# Patient Record
Sex: FEMALE | Race: WHITE | NOT HISPANIC OR LATINO | Employment: OTHER | URBAN - METROPOLITAN AREA
[De-identification: names, ages, dates, MRNs, and addresses within clinical notes are randomized per-mention and may not be internally consistent; named-entity substitution may affect disease eponyms.]

---

## 2017-01-04 DIAGNOSIS — Z53.9 ERRONEOUS ENCOUNTER--DISREGARD: Primary | ICD-10-CM

## 2017-01-11 ENCOUNTER — OFFICE VISIT (OUTPATIENT)
Dept: RHEUMATOLOGY | Facility: CLINIC | Age: 67
End: 2017-01-11
Payer: MEDICARE

## 2017-01-11 ENCOUNTER — INFUSION THERAPY VISIT (OUTPATIENT)
Dept: INFUSION THERAPY | Facility: CLINIC | Age: 67
End: 2017-01-11
Attending: INTERNAL MEDICINE
Payer: MEDICARE

## 2017-01-11 VITALS — SYSTOLIC BLOOD PRESSURE: 137 MMHG | TEMPERATURE: 97.1 F | HEART RATE: 72 BPM | DIASTOLIC BLOOD PRESSURE: 74 MMHG

## 2017-01-11 VITALS
DIASTOLIC BLOOD PRESSURE: 65 MMHG | BODY MASS INDEX: 25.6 KG/M2 | WEIGHT: 166 LBS | SYSTOLIC BLOOD PRESSURE: 130 MMHG | TEMPERATURE: 97.7 F | HEART RATE: 79 BPM | RESPIRATION RATE: 16 BRPM

## 2017-01-11 DIAGNOSIS — M06.9 RHEUMATOID ARTHRITIS INVOLVING MULTIPLE SITES, UNSPECIFIED RHEUMATOID FACTOR PRESENCE: Primary | ICD-10-CM

## 2017-01-11 PROCEDURE — 96365 THER/PROPH/DIAG IV INF INIT: CPT

## 2017-01-11 PROCEDURE — 99213 OFFICE O/P EST LOW 20 MIN: CPT | Performed by: INTERNAL MEDICINE

## 2017-01-11 PROCEDURE — 25000128 H RX IP 250 OP 636: Performed by: INTERNAL MEDICINE

## 2017-01-11 RX ORDER — DIPHENHYDRAMINE HCL 25 MG
25 CAPSULE ORAL ONCE
Status: DISCONTINUED | OUTPATIENT
Start: 2017-01-11 | End: 2017-01-11 | Stop reason: HOSPADM

## 2017-01-11 RX ORDER — ACETAMINOPHEN 325 MG/1
650 TABLET ORAL ONCE
Status: DISCONTINUED | OUTPATIENT
Start: 2017-01-11 | End: 2017-01-11 | Stop reason: HOSPADM

## 2017-01-11 RX ORDER — PREDNISONE 5 MG/1
10 TABLET ORAL DAILY
Qty: 120 TABLET | Refills: 2 | Status: SHIPPED | OUTPATIENT
Start: 2017-01-11 | End: 2017-08-30

## 2017-01-11 RX ORDER — DIAZEPAM 5 MG
5 TABLET ORAL EVERY 12 HOURS PRN
Qty: 60 TABLET | Refills: 0 | Status: SHIPPED | OUTPATIENT
Start: 2017-01-11 | End: 2017-03-03

## 2017-01-11 RX ADMIN — SODIUM CHLORIDE 750 MG: 9 INJECTION, SOLUTION INTRAVENOUS at 11:01

## 2017-01-11 NOTE — PROGRESS NOTES
"Infusion Nursing Note:  Leanne Zuñiga presents today for Orencia.    Patient seen by provider today: No    Note: pt did not take premeds at clinic as she has been self administering this med at home for years.    Intravenous Access:  Peripheral IV placed.    Treatment Conditions:  Rheumatology Infusion Checklist PRIOR TO INFUSION OF BIOLOGICAL MEDICATIONS OR ANY OF THESE AS LISTED: Orencia (abatacept) \".rheumbiologicalchecklist\"    Prior to Infusion of biological medications or any of these as listed:    1. Elevated temperature, fever, chills, productive cough or abnormal vital signs, night sweats, coughing up blood or sputum, no appetite or abnormal vital signs : NO    2. Open wounds or new incisions: NO    3. Recent hospitalization: NO    4.  Recent surgeries:  NO    5. Any upcoming surgeries or dental procedures?:NO    6. Any current or recent bouts of illness or infection? On any antibiotics? : NO    7. Any new, sudden or worsening abdominal pain :NO    8. Vaccination within 4 weeks? Patient or someone in the household is scheduled to receive vaccination? No live virus vaccines prior to or during treatment :NO    9. Any nervous system diseases [i.e. multiple sclerosis, Guillain-[place], seizures, neurological  changes]: NO    10. Pregnant or breast feeding; or plans on pregnancy in the future: NO    11. Signs of worsening depression or suicidal ideations while taking benlysta:NO    12. New-onset medical symptoms: NO    13.  New cancer diagnosis or on chemotherapy or radiation NO    14.  Evaluate for any sign of active TB [Unexplained weight loss, Loss of appetite, Night sweats, Fever, Fatigue, Chills, Coughing for 3 weeks or longer, Hemoptysis (coughing up blood), Chest pain]: NO    **Note: If answered yes to any of the above, hold the infusion and contact ordering rheumatologist or on-call rheumatologist.   .      Post Infusion Assessment:  Patient tolerated infusion without incident.    Discharge Plan: "   Patient discharged in stable condition accompanied by: roz.    Chaparrita Hahn RN

## 2017-01-11 NOTE — PROGRESS NOTES
HISTORY OF PRESENT ILLNESS:  Shaheed Zuñiga is seen back in followup for her rheumatoid arthritis.  She has now received 1 infusion of Orencia however, she tells me she has a high co-pay and did not understand that this was going to be an issue.  The infusion did make her feel quite tired and fatigued afterwards but overall she showed me her prednisone dosing and she cut it down to 11 mg a day with a few minor bumps in the road.  She does think the Orencia overall, is working and admits to less pain, swelling and stiffness which she is taking it consistently.  No infusion reactions noted, no infections.  She does say that it makes her quite laureano and has a son who apparently sees another rheumatologist who gives him Valium for his moodiness which is likely associated with the prednisone usage.  She still has some pain and swelling involving both wrists in her left knee, but admits overall these are improved.      PHYSICAL EXAMINATION:     VITALS:  Blood pressure 130/65, pulse 79, weight 166.     MUSCULOSKELETAL:  There is still mild synovitis of both wrists.  There is also mild synovitis with effusion of the left knee.  Overall, I do think things are better though.      IMPRESSION:  Rheumatoid arthritis.      RECOMMENDATIONS:   1.  I will give her some Valium for the anxiety, this probably being caused by the higher dose of prednisone.   2.  Continue Orencia infusions.  I will see if there is any copay assistance that is available to her or assistance from [facility] or [facility], continue monthly infusions.   3.  Continue to taper the prednisone with goal to get below 10 mg a day.     4.  She should have labs checked with her next infusion.     5.  She should have routine followup with me in 2-3 months.         KARIME VINCENT MD             D: 2017 14:12   T: 2017 16:38   MT: JASON#[id number]      Name:     SHAHEED ZUÑIGA   MRN:      [id number]        Account:      [id number]   :      1950            Visit Date:   01/11/2017      Document: [id number]

## 2017-01-11 NOTE — NURSING NOTE
"Chief Complaint   Patient presents with     RECHECK     RA       Initial /65 mmHg  Pulse 79  Temp(Src) 97.7  F (36.5  C) (Oral)  Resp 16  Wt 166 lb (75.297 kg) Estimated body mass index is 25.6 kg/(m^2) as calculated from the following:    Height as of 11/4/15: 5' 7.5\" (1.715 m).    Weight as of this encounter: 166 lb (75.297 kg).  BP completed using cuff size: sandor Lerner LPN      "

## 2017-02-08 ENCOUNTER — HOSPITAL ENCOUNTER (OUTPATIENT)
Dept: LAB | Facility: CLINIC | Age: 67
Discharge: HOME | End: 2017-02-08
Attending: INTERNAL MEDICINE | Admitting: INTERNAL MEDICINE
Payer: MEDICARE

## 2017-02-08 ENCOUNTER — INFUSION THERAPY VISIT (OUTPATIENT)
Dept: INFUSION THERAPY | Facility: CLINIC | Age: 67
End: 2017-02-08
Attending: INTERNAL MEDICINE
Payer: MEDICARE

## 2017-02-08 VITALS
DIASTOLIC BLOOD PRESSURE: 80 MMHG | TEMPERATURE: 98.1 F | HEART RATE: 74 BPM | RESPIRATION RATE: 16 BRPM | SYSTOLIC BLOOD PRESSURE: 137 MMHG

## 2017-02-08 DIAGNOSIS — M06.9 RHEUMATOID ARTHRITIS INVOLVING MULTIPLE SITES, UNSPECIFIED RHEUMATOID FACTOR PRESENCE: Primary | ICD-10-CM

## 2017-02-08 LAB
ALBUMIN SERPL-MCNC: 3.4 G/DL (ref 3.4–5)
ALP SERPL-CCNC: 76 U/L (ref 40–150)
ALT SERPL W P-5'-P-CCNC: 27 U/L (ref 0–50)
ANION GAP SERPL CALCULATED.3IONS-SCNC: 7 MMOL/L (ref 3–14)
AST SERPL W P-5'-P-CCNC: 13 U/L (ref 0–45)
BASOPHILS # BLD AUTO: 0 10E9/L (ref 0–0.2)
BASOPHILS NFR BLD AUTO: 0.4 %
BILIRUB SERPL-MCNC: 0.5 MG/DL (ref 0.2–1.3)
BUN SERPL-MCNC: 8 MG/DL (ref 7–30)
CALCIUM SERPL-MCNC: 8.8 MG/DL (ref 8.5–10.1)
CHLORIDE SERPL-SCNC: 108 MMOL/L (ref 94–109)
CO2 SERPL-SCNC: 28 MMOL/L (ref 20–32)
CREAT SERPL-MCNC: 0.86 MG/DL (ref 0.52–1.04)
DIFFERENTIAL METHOD BLD: ABNORMAL
EOSINOPHIL # BLD AUTO: 0.3 10E9/L (ref 0–0.7)
EOSINOPHIL NFR BLD AUTO: 2.4 %
ERYTHROCYTE [DISTWIDTH] IN BLOOD BY AUTOMATED COUNT: 13.1 % (ref 10–15)
GFR SERPL CREATININE-BSD FRML MDRD: 67 ML/MIN/1.7M2
GLUCOSE SERPL-MCNC: 84 MG/DL (ref 70–99)
HCT VFR BLD AUTO: 47 % (ref 35–47)
HGB BLD-MCNC: 15.8 G/DL (ref 11.7–15.7)
IMM GRANULOCYTES # BLD: 0 10E9/L (ref 0–0.4)
IMM GRANULOCYTES NFR BLD: 0.4 %
LYMPHOCYTES # BLD AUTO: 3.9 10E9/L (ref 0.8–5.3)
LYMPHOCYTES NFR BLD AUTO: 37.7 %
MCH RBC QN AUTO: 30.6 PG (ref 26.5–33)
MCHC RBC AUTO-ENTMCNC: 33.6 G/DL (ref 31.5–36.5)
MCV RBC AUTO: 91 FL (ref 78–100)
MONOCYTES # BLD AUTO: 1.1 10E9/L (ref 0–1.3)
MONOCYTES NFR BLD AUTO: 11 %
NEUTROPHILS # BLD AUTO: 5 10E9/L (ref 1.6–8.3)
NEUTROPHILS NFR BLD AUTO: 48.1 %
PLATELET # BLD AUTO: 355 10E9/L (ref 150–450)
POTASSIUM SERPL-SCNC: 3.9 MMOL/L (ref 3.4–5.3)
PROT SERPL-MCNC: 6.7 G/DL (ref 6.8–8.8)
RBC # BLD AUTO: 5.16 10E12/L (ref 3.8–5.2)
SODIUM SERPL-SCNC: 143 MMOL/L (ref 133–144)
WBC # BLD AUTO: 10.3 10E9/L (ref 4–11)

## 2017-02-08 PROCEDURE — 36415 COLL VENOUS BLD VENIPUNCTURE: CPT | Performed by: INTERNAL MEDICINE

## 2017-02-08 PROCEDURE — 80053 COMPREHEN METABOLIC PANEL: CPT | Performed by: INTERNAL MEDICINE

## 2017-02-08 PROCEDURE — 85025 COMPLETE CBC W/AUTO DIFF WBC: CPT | Performed by: INTERNAL MEDICINE

## 2017-02-08 PROCEDURE — 25000128 H RX IP 250 OP 636: Performed by: INTERNAL MEDICINE

## 2017-02-08 PROCEDURE — 96365 THER/PROPH/DIAG IV INF INIT: CPT

## 2017-02-08 RX ORDER — ACETAMINOPHEN 325 MG/1
650 TABLET ORAL ONCE
Status: DISCONTINUED | OUTPATIENT
Start: 2017-02-08 | End: 2017-02-08 | Stop reason: HOSPADM

## 2017-02-08 RX ORDER — DIPHENHYDRAMINE HCL 25 MG
25 CAPSULE ORAL ONCE
Status: DISCONTINUED | OUTPATIENT
Start: 2017-02-08 | End: 2017-02-08 | Stop reason: HOSPADM

## 2017-02-08 RX ADMIN — SODIUM CHLORIDE 750 MG: 9 INJECTION, SOLUTION INTRAVENOUS at 11:48

## 2017-02-08 NOTE — Clinical Note
[facility] CANCER INFUSION  [facility]  [address]  [place] [address]  [phone]      February 9, 2017      Leanne Zuñiga  [address] [address]        Dear Dr. Olu Perdomo has reviewed the results of your labs of 2/8/17, and has made the following observations:      Labs are normal    Please don't hesitate to contact our office with any additional questions or concerns.           Sincerely,      GINNY KrausA  For Nate Raines MD

## 2017-02-08 NOTE — PROGRESS NOTES
"Infusion Nursing Note:   Leanne Zuñiga presents today for Orencia infusion.    Patient seen by provider today: No   present during visit today: Not Applicable.    Note: N/A.    Intravenous Access:  Peripheral IV placed.    Treatment Conditions:  Rheumatology Infusion Checklist: PRIOR TO INFUSION OF BIOLOGICAL MEDICATIONS OR ANY OF THESE AS LISTED: Orencia (abatacept) \".rheumbiologicalchecklist\"    Prior to Infusion of biological medications or any of these as listed:    1. Elevated temperature, fever, chills, productive cough or abnormal vital signs, night sweats, coughing up blood or sputum, no appetite or abnormal vital signs : NO    2. Open wounds or new incisions: NO    3. Recent hospitalization: NO    4.  Recent surgeries:  NO    5. Any upcoming surgeries or dental procedures?:NO    6. Any current or recent bouts of illness or infection? On any antibiotics? : NO    7. Any new, sudden or worsening abdominal pain :NO    8. Vaccination within 4 weeks? Patient or someone in the household is scheduled to receive vaccination? No live virus vaccines prior to or during treatment :NO    9. Any nervous system diseases [i.e. multiple sclerosis, Guillain-[place], seizures, neurological  changes]: NO    10. Pregnant or breast feeding; or plans on pregnancy in the future: NO    11. Signs of worsening depression or suicidal ideations while taking benlysta:NO    12. New-onset medical symptoms: NO    13.  New cancer diagnosis or on chemotherapy or radiation NO    14.  Evaluate for any sign of active TB [Unexplained weight loss, Loss of appetite, Night sweats, Fever, Fatigue, Chills, Coughing for 3 weeks or longer, Hemoptysis (coughing up blood), Chest pain]: NO    **Note: If answered yes to any of the above, hold the infusion and contact ordering rheumatologist or on-call rheumatologist.   .      Post Infusion Assessment:  Patient tolerated infusion without incident.    Discharge Plan:   Patient discharged in " stable condition accompanied by: self.    Brandi Carranza, RN

## 2017-03-03 DIAGNOSIS — M06.9 RHEUMATOID ARTHRITIS INVOLVING MULTIPLE SITES, UNSPECIFIED RHEUMATOID FACTOR PRESENCE: ICD-10-CM

## 2017-03-06 RX ORDER — DIAZEPAM 5 MG
5 TABLET ORAL EVERY 12 HOURS PRN
Qty: 60 TABLET | Refills: 0 | Status: SHIPPED | OUTPATIENT
Start: 2017-03-06 | End: 2017-06-07

## 2017-03-08 ENCOUNTER — INFUSION THERAPY VISIT (OUTPATIENT)
Dept: INFUSION THERAPY | Facility: CLINIC | Age: 67
End: 2017-03-08
Attending: INTERNAL MEDICINE
Payer: MEDICARE

## 2017-03-08 VITALS — HEART RATE: 76 BPM | TEMPERATURE: 98.6 F | SYSTOLIC BLOOD PRESSURE: 122 MMHG | DIASTOLIC BLOOD PRESSURE: 69 MMHG

## 2017-03-08 DIAGNOSIS — M06.9 RHEUMATOID ARTHRITIS INVOLVING MULTIPLE SITES, UNSPECIFIED RHEUMATOID FACTOR PRESENCE: Primary | ICD-10-CM

## 2017-03-08 PROCEDURE — 25000128 H RX IP 250 OP 636: Performed by: INTERNAL MEDICINE

## 2017-03-08 PROCEDURE — 96365 THER/PROPH/DIAG IV INF INIT: CPT

## 2017-03-08 RX ORDER — ACETAMINOPHEN 325 MG/1
650 TABLET ORAL ONCE
Status: CANCELLED
Start: 2017-03-08 | End: 2017-03-08

## 2017-03-08 RX ORDER — DIPHENHYDRAMINE HCL 25 MG
25 CAPSULE ORAL ONCE
Status: CANCELLED
Start: 2017-03-08 | End: 2017-03-08

## 2017-03-08 RX ADMIN — SODIUM CHLORIDE 250 ML: 9 INJECTION, SOLUTION INTRAVENOUS at 14:07

## 2017-03-08 RX ADMIN — SODIUM CHLORIDE 750 MG: 9 INJECTION, SOLUTION INTRAVENOUS at 14:07

## 2017-03-08 NOTE — PROGRESS NOTES
"Infusion Nursing Note:  Leanne Zuñiga presents today for Orencia.    Patient seen by provider today: No   present during visit today: Not Applicable.    Note: N/A.    Intravenous Access:  Peripheral IV placed.    Treatment Conditions:  Rheumatology Infusion Checklist: PRIOR TO INFUSION OF BIOLOGICAL MEDICATIONS OR ANY OF THESE AS LISTED: Orencia (abatacept) \".rheumbiologicalchecklist\"    Prior to Infusion of biological medications or any of these as listed:    1. Elevated temperature, fever, chills, productive cough or abnormal vital signs, night sweats, coughing up blood or sputum, no appetite or abnormal vital signs : NO    2. Open wounds or new incisions: NO    3. Recent hospitalization: NO    4.  Recent surgeries:  NO    5. Any upcoming surgeries or dental procedures?:NO    6. Any current or recent bouts of illness or infection? On any antibiotics? : NO    7. Any new, sudden or worsening abdominal pain :NO    8. Vaccination within 4 weeks? Patient or someone in the household is scheduled to receive vaccination? No live virus vaccines prior to or during treatment :NO    9. Any nervous system diseases [i.e. multiple sclerosis, Guillain-[place], seizures, neurological  changes]: NO    10. Pregnant or breast feeding; or plans on pregnancy in the future: N/A    11. Signs of worsening depression or suicidal ideations while taking benlysta:N/A    12. New-onset medical symptoms: NO    13.  New cancer diagnosis or on chemotherapy or radiation NO    14.  Evaluate for any sign of active TB [Unexplained weight loss, Loss of appetite, Night sweats, Fever, Fatigue, Chills, Coughing for 3 weeks or longer, Hemoptysis (coughing up blood), Chest pain]: NO    **Note: If answered yes to any of the above, hold the infusion and contact ordering rheumatologist or on-call rheumatologist.   .      Post Infusion Assessment:  Patient tolerated infusion without incident.  Blood return noted pre and post infusion.  Site patent " and intact, free from redness, edema or discomfort.  No evidence of extravasations.  Access discontinued per protocol.    Discharge Plan:   Patient discharged in stable condition accompanied by: self.  Departure Mode: Ambulatory.    Brandi Badillo RN

## 2017-03-08 NOTE — MR AVS SNAPSHOT
"              After Visit Summary   3/8/2017    Leanne Zuñiga    MRN: [id number]           Patient Information     Date Of Birth          1950        Visit Information        Provider Department      3/8/2017 1:30 PM ROOM 10 [facility] Cancer Infusion        Today's Diagnoses     Rheumatoid arthritis involving multiple sites, unspecified rheumatoid factor presence (H)    -  1       Follow-ups after your visit        Your next 10 appointments already scheduled     Apr 12, 2017  1:30 PM CDT   Level 1 with ROOM 5 [facility] Cancer Infusion ([facility])    n [facility]  [address]  [facility] [address] [phone]            Apr 12, 2017  2:30 PM CDT   Return Visit with Nate Raines MD   [facility]    [address] [id number] [phone]              Who to contact     If you have questions or need follow up information about today's clinic visit or your schedule please contact [facility] directly at [phone].  Normal or non-critical lab and imaging results will be communicated to you by [employer], letter or phone within 4 business days after the clinic has received the results. If you do not hear from us within 7 days, please contact the clinic through [employer] or phone. If you have a critical or abnormal lab result, we will notify you by phone as soon as possible.  Submit refill requests through [employer] or call your pharmacy and they will forward the refill request to us. Please allow 3 business days for your refill to be completed.          Additional Information About Your Visit        MyChart Information     [employer] lets you send messages to your doctor, view your test results, renew your prescriptions, schedule appointments and more. To sign up, go to [email or web address] . Click on \"Log in\" on the left side of the screen, which will take you to the Welcome page. " "Then click on \"Sign up Now\" on the right side of the page.     You will be asked to enter the access code listed below, as well as some personal information. Please follow the directions to create your username and password.     Your access code is: [email or web address]  Expires: 2017  3:08 PM     Your access code will  in 90 days. If you need help or a new code, please call your [facility] [place] clinic or [phone].        Care EveryWhere ID     This is your Care EveryWhere ID. This could be used by other organizations to access your [facility] [place] medical records  [id number]        Your Vitals Were     Pulse Temperature                76 98.6  F (37  C) (Oral)           Blood Pressure from Last 3 Encounters:   17 122/69   17 137/80   17 130/65    Weight from Last 3 Encounters:   17 75.3 kg (166 lb)   16 72.1 kg (159 lb)   16 71.7 kg (158 lb)              We Performed the Following     Treatment Conditions     Treatment Conditions     Treatment Conditions        Primary Care Provider Office Phone # Fax #    Cleo Henry [phone]       [facility] [address] [address]        Thank you!     Thank you for choosing [facility]  for your care. Our goal is always to provide you with excellent care. Hearing back from our patients is one way we can continue to improve our services. Please take a few minutes to complete the written survey that you may receive in the mail after your visit with us. Thank you!             Your Updated Medication List - Protect others around you: Learn how to safely use, store and throw away your medicines at www.disposemymeds.org.          This list is accurate as of: 3/8/17  3:08 PM.  Always use your most recent med list.                   Brand Name Dispense Instructions for use    acetaminophen 325 MG tablet    TYLENOL    100 tablet    Take 2 tablets (650 mg) by mouth every 4 hours as needed for pain    "    ALOE VERA JUICE Liqd      2 oz twice daily       Alpha-Lipoic Acid 50 MG Caps      100-200 mg daily       ascorbic acid 500 MG tablet    VITAMIN C    30 tablet    Take 3 tablets (1,500 mg) by mouth daily       calcium-magnesium 500-250 MG Tabs per tablet    CALMAG     Take 1 tablet by mouth daily       diazepam 5 MG tablet    VALIUM    60 tablet    Take 1 tablet (5 mg) by mouth every 12 hours as needed for anxiety or sleep       HYDROcodone-acetaminophen 5-325 MG per tablet    NORCO    240 tablet    Take 1-2 tablets by mouth every 6 hours as needed for moderate to severe pain or pain       ibuprofen 600 MG tablet    ADVIL/MOTRIN    90 tablet    Take 1 tablet (600 mg) by mouth every 6 hours as needed for pain       Krill Oil 1000 MG Caps      Taking 2 daily.       niacin 500 MG tablet      Take 1 tablet (500 mg) by mouth daily (with breakfast) Takes 2000 mg daily       * OVER-THE-COUNTER      Tumeric       * OVER-THE-COUNTER      Megastress B with vitamin C 2 tablets daily       * OVER-THE-COUNTER      Powdered barely grass 1 tablespoon daily       * OVER-THE-COUNTER      Bio-Flex.       * OVER-THE-COUNTER      Ashwagandha 400 mg daily       * predniSONE 1 MG tablet    DELTASONE    120 tablet    Taper as directed from 12.5 mg       * predniSONE 5 MG tablet    DELTASONE    120 tablet    Take 2 tablets (10 mg) by mouth daily Patient is tapering from 11 mg daily--currently will need to take 2 5 mg tablets daily       vitamin D 2000 UNITS tablet     100 tablet    Take 2,000 Units by mouth Twice weekly       * Notice:  This list has 7 medication(s) that are the same as other medications prescribed for you. Read the directions carefully, and ask your doctor or other care provider to review them with you.

## 2017-04-12 ENCOUNTER — INFUSION THERAPY VISIT (OUTPATIENT)
Dept: INFUSION THERAPY | Facility: CLINIC | Age: 67
End: 2017-04-12
Attending: INTERNAL MEDICINE
Payer: MEDICARE

## 2017-04-12 ENCOUNTER — OFFICE VISIT (OUTPATIENT)
Dept: RHEUMATOLOGY | Facility: CLINIC | Age: 67
End: 2017-04-12
Payer: MEDICARE

## 2017-04-12 VITALS
DIASTOLIC BLOOD PRESSURE: 73 MMHG | WEIGHT: 161.8 LBS | SYSTOLIC BLOOD PRESSURE: 127 MMHG | RESPIRATION RATE: 16 BRPM | HEART RATE: 70 BPM | TEMPERATURE: 97.4 F | HEIGHT: 68 IN | BODY MASS INDEX: 24.52 KG/M2

## 2017-04-12 VITALS — SYSTOLIC BLOOD PRESSURE: 127 MMHG | TEMPERATURE: 97.4 F | HEART RATE: 70 BPM | DIASTOLIC BLOOD PRESSURE: 73 MMHG

## 2017-04-12 DIAGNOSIS — M06.9 RHEUMATOID ARTHRITIS INVOLVING MULTIPLE SITES, UNSPECIFIED RHEUMATOID FACTOR PRESENCE: Primary | ICD-10-CM

## 2017-04-12 DIAGNOSIS — R21 RASH AND NONSPECIFIC SKIN ERUPTION: ICD-10-CM

## 2017-04-12 DIAGNOSIS — R53.82 CHRONIC FATIGUE: ICD-10-CM

## 2017-04-12 LAB
CRP SERPL-MCNC: 10.5 MG/L (ref 0–8)
ERYTHROCYTE [SEDIMENTATION RATE] IN BLOOD BY WESTERGREN METHOD: 7 MM/H (ref 0–30)
TSH SERPL DL<=0.005 MIU/L-ACNC: 0.53 MU/L (ref 0.4–4)

## 2017-04-12 PROCEDURE — 99213 OFFICE O/P EST LOW 20 MIN: CPT | Performed by: INTERNAL MEDICINE

## 2017-04-12 PROCEDURE — 85652 RBC SED RATE AUTOMATED: CPT | Performed by: INTERNAL MEDICINE

## 2017-04-12 PROCEDURE — 25000128 H RX IP 250 OP 636: Performed by: INTERNAL MEDICINE

## 2017-04-12 PROCEDURE — 84443 ASSAY THYROID STIM HORMONE: CPT | Performed by: INTERNAL MEDICINE

## 2017-04-12 PROCEDURE — 36415 COLL VENOUS BLD VENIPUNCTURE: CPT | Performed by: INTERNAL MEDICINE

## 2017-04-12 PROCEDURE — 86140 C-REACTIVE PROTEIN: CPT | Performed by: INTERNAL MEDICINE

## 2017-04-12 PROCEDURE — 96365 THER/PROPH/DIAG IV INF INIT: CPT

## 2017-04-12 RX ORDER — ACETAMINOPHEN 325 MG/1
650 TABLET ORAL ONCE
Status: CANCELLED
Start: 2017-04-12 | End: 2017-04-12

## 2017-04-12 RX ORDER — HYDROCODONE BITARTRATE AND ACETAMINOPHEN 5; 325 MG/1; MG/1
1-2 TABLET ORAL EVERY 6 HOURS PRN
Qty: 240 TABLET | Refills: 0 | Status: SHIPPED | OUTPATIENT
Start: 2017-04-12 | End: 2017-12-14

## 2017-04-12 RX ORDER — DIPHENHYDRAMINE HCL 25 MG
25 CAPSULE ORAL ONCE
Status: CANCELLED
Start: 2017-04-12 | End: 2017-04-12

## 2017-04-12 RX ADMIN — SODIUM CHLORIDE 750 MG: 9 INJECTION, SOLUTION INTRAVENOUS at 14:04

## 2017-04-12 NOTE — NURSING NOTE
"Initial /73  Pulse 70  Temp 97.4  F (36.3  C) (Oral)  Resp 16  Ht 1.727 m (5' 8\")  Wt 73.4 kg (161 lb 12.8 oz)  BMI 24.6 kg/m2 Estimated body mass index is 24.6 kg/(m^2) as calculated from the following:    Height as of this encounter: 1.727 m (5' 8\").    Weight as of this encounter: 73.4 kg (161 lb 12.8 oz). .    Ladarius Ellison, PJ    "

## 2017-04-12 NOTE — LETTER
[facility]  [address] [address]  [phone]      April 13, 2017      Leanne Zuñiga  [address] [address]        Dear Dr. Olu Perdomo has reviewed the results of your labs of 4/12/17, and has made the following observations:      Labs are normal.   Thyroid is normal too.    Please don't hesitate to contact our office with any additional questions or concerns.           Sincerely,    GINNY KrausA  For Nate Raines MD

## 2017-04-12 NOTE — PROGRESS NOTES
"Infusion Nursing Note:  Leanne Zuñiga presents today for Orencia.    Patient seen by provider today: No   present during visit today: Not Applicable.    Note: N/A.    Intravenous Access:  Peripheral IV placed.    Treatment Conditions:  Rheumatology Infusion Checklist: PRIOR TO INFUSION OF BIOLOGICAL MEDICATIONS OR ANY OF THESE AS LISTED: Orencia (abatacept) \".rheumbiologicalchecklist\"    Prior to Infusion of biological medications or any of these as listed:    1. Elevated temperature, fever, chills, productive cough or abnormal vital signs, night sweats, coughing up blood or sputum, no appetite or abnormal vital signs : NO    2. Open wounds or new incisions: NO    3. Recent hospitalization: NO    4.  Recent surgeries:  NO    5. Any upcoming surgeries or dental procedures?:NO    6. Any current or recent bouts of illness or infection? On any antibiotics? : NO    7. Any new, sudden or worsening abdominal pain :NO    8. Vaccination within 4 weeks? Patient or someone in the household is scheduled to receive vaccination? No live virus vaccines prior to or during treatment :NO    9. Any nervous system diseases [i.e. multiple sclerosis, Guillain-[place], seizures, neurological  changes]: NO    10. Pregnant or breast feeding; or plans on pregnancy in the future: NO    11. Signs of worsening depression or suicidal ideations while taking benlysta:NO    12. New-onset medical symptoms: NO    13.  New cancer diagnosis or on chemotherapy or radiation NO    14.  Evaluate for any sign of active TB [Unexplained weight loss, Loss of appetite, Night sweats, Fever, Fatigue, Chills, Coughing for 3 weeks or longer, Hemoptysis (coughing up blood), Chest pain]: NO    **Note: If answered yes to any of the above, hold the infusion and contact ordering rheumatologist or on-call rheumatologist.   .      Post Infusion Assessment:  Patient tolerated infusion without incident.  Blood return noted pre and post infusion.  Site patent " and intact, free from redness, edema or discomfort.  No evidence of extravasations.  Access discontinued per protocol.    Discharge Plan:   Patient discharged in stable condition accompanied by: self.    Chaparrita Hahn RN

## 2017-04-12 NOTE — MR AVS SNAPSHOT
"              After Visit Summary   4/12/2017    Leanne Zuñiga    MRN: [id number]           Patient Information     Date Of Birth          1950        Visit Information        Provider Department      4/12/2017 1:30 PM ROOM 5 [facility]        Today's Diagnoses     Rheumatoid arthritis involving multiple sites, unspecified rheumatoid factor presence (H)    -  1       Follow-ups after your visit        Your next 10 appointments already scheduled     Apr 12, 2017  2:30 PM CDT   Return Visit with Nate Raines MD   [facility]    [address]  [facility] [address]   [phone]            May 10, 2017  2:00 PM CDT   Level 1 with ROOM 4 [facility] ([facility])    n Medical Ctr [facility]  [address]  [facility] [address]   [phone]              Who to contact     If you have questions or need follow up information about today's clinic visit or your schedule please contact [facility] directly at [phone].  Normal or non-critical lab and imaging results will be communicated to you by TIME PLUS Qhart, letter or phone within 4 business days after the clinic has received the results. If you do not hear from us within 7 days, please contact the clinic through [email or web address] or phone. If you have a critical or abnormal lab result, we will notify you by phone as soon as possible.  Submit refill requests through Fotoup or call your pharmacy and they will forward the refill request to us. Please allow 3 business days for your refill to be completed.          Additional Information About Your Visit        MyChart Information     Fotoup lets you send messages to your doctor, view your test results, renew your prescriptions, schedule appointments and more. To sign up, go to [email or web address] . Click on \"Log in\" on the left side of the screen, which will take you to the Welcome " "page. Then click on \"Sign up Now\" on the right side of the page.     You will be asked to enter the access code listed below, as well as some personal information. Please follow the directions to create your username and password.     Your access code is: [email or web address]  Expires: 2017  4:08 PM     Your access code will  in 90 days. If you need help or a new code, please call your [facility] clinic or [phone].        Care EveryWhere ID     This is your Care EveryWhere ID. This could be used by other organizations to access your [facility] medical records  [id number]        Your Vitals Were     Pulse Temperature                70 97.4  F (36.3  C) (Oral)           Blood Pressure from Last 3 Encounters:   17 127/73   17 122/69   17 137/80    Weight from Last 3 Encounters:   17 75.3 kg (166 lb)   16 72.1 kg (159 lb)   16 71.7 kg (158 lb)              Today, you had the following     No orders found for display       Primary Care Provider Office Phone # Fax #    Cleo Henry [phone]       [facility] [address] [id number]        Thank you!     Thank you for choosing [facility]  for your care. Our goal is always to provide you with excellent care. Hearing back from our patients is one way we can continue to improve our services. Please take a few minutes to complete the written survey that you may receive in the mail after your visit with us. Thank you!             Your Updated Medication List - Protect others around you: Learn how to safely use, store and throw away your medicines at www.disposemymeds.org.          This list is accurate as of: 17  2:29 PM.  Always use your most recent med list.                   Brand Name Dispense Instructions for use    acetaminophen 325 MG tablet    TYLENOL    100 tablet    Take 2 tablets (650 mg) by mouth every 4 hours as needed for pain       ALOE VERA JUICE Liqd      2 oz " twice daily       Alpha-Lipoic Acid 50 MG Caps      100-200 mg daily       ascorbic acid 500 MG tablet    VITAMIN C    30 tablet    Take 3 tablets (1,500 mg) by mouth daily       calcium-magnesium 500-250 MG Tabs per tablet    CALMAG     Take 1 tablet by mouth daily       diazepam 5 MG tablet    VALIUM    60 tablet    Take 1 tablet (5 mg) by mouth every 12 hours as needed for anxiety or sleep       HYDROcodone-acetaminophen 5-325 MG per tablet    NORCO    240 tablet    Take 1-2 tablets by mouth every 6 hours as needed for moderate to severe pain or pain       ibuprofen 600 MG tablet    ADVIL/MOTRIN    90 tablet    Take 1 tablet (600 mg) by mouth every 6 hours as needed for pain       Krill Oil 1000 MG Caps      Taking 2 daily.       niacin 500 MG tablet      Take 1 tablet (500 mg) by mouth daily (with breakfast) Takes 2000 mg daily       * OVER-THE-COUNTER      Tumeric       * OVER-THE-COUNTER      Megastress B with vitamin C 2 tablets daily       * OVER-THE-COUNTER      Powdered barely grass 1 tablespoon daily       * OVER-THE-COUNTER      Bio-Flex.       * OVER-THE-COUNTER      Ashwagandha 400 mg daily       * predniSONE 1 MG tablet    DELTASONE    120 tablet    Taper as directed from 12.5 mg       * predniSONE 5 MG tablet    DELTASONE    120 tablet    Take 2 tablets (10 mg) by mouth daily Patient is tapering from 11 mg daily--currently will need to take 2 5 mg tablets daily       vitamin D 2000 UNITS tablet     100 tablet    Take 2,000 Units by mouth Twice weekly       * Notice:  This list has 7 medication(s) that are the same as other medications prescribed for you. Read the directions carefully, and ask your doctor or other care provider to review them with you.

## 2017-04-12 NOTE — MR AVS SNAPSHOT
After Visit Summary   4/12/2017    Leanne Zuñiga    MRN: [id number]           Patient Information     Date Of Birth          1950        Visit Information        Provider Department      4/12/2017 2:30 PM Nate Raines MD [facility]        Today's Diagnoses     Rheumatoid arthritis involving multiple sites, unspecified rheumatoid factor presence (H)    -  1    Rash and nonspecific skin eruption        Chronic fatigue          Care Instructions    Per Physician's instructions          Follow-ups after your visit        Your next 10 appointments already scheduled     May 10, 2017  2:00 PM CDT   Level 1 with ROOM 4 [facility] ([facility])    [facility]  [address]  [place] [address]   [phone]            Jun 07, 2017  1:00 PM CDT   Level 1 with ROOM 9 [facility] ([facility])    [facility]  [address]  [place] [address]   [phone]            Jun 07, 2017  2:30 PM CDT   Return Visit with Nate Raines MD   [facility]    [address] [facility] [address]   [phone]              Who to contact     If you have questions or need follow up information about today's clinic visit or your schedule please contact [facility] directly at [phone].  Normal or non-critical lab and imaging results will be communicated to you by MyChart, letter or phone within 4 business days after the clinic has received the results. If you do not hear from us within 7 days, please contact the clinic through MyChart or phone. If you have a critical or abnormal lab result, we will notify you by phone as soon as possible.  Submit refill requests through WiOffer or call your pharmacy and they will forward the refill request to us. Please allow 3 business days for your  "refill to be completed.          Additional Information About Your Visit        Possible Web Information     Possible Web lets you send messages to your doctor, view your test results, renew your prescriptions, schedule appointments and more. To sign up, go to [email or web address] . Click on \"Log in\" on the left side of the screen, which will take you to the Welcome page. Then click on \"Sign up Now\" on the right side of the page.     You will be asked to enter the access code listed below, as well as some personal information. Please follow the directions to create your username and password.     Your access code is: [email or web address]  Expires: 2017  4:08 PM     Your access code will  in 90 days. If you need help or a new code, please call your [facility] clinic or [phone].        Care EveryWhere ID     This is your Care EveryWhere ID. This could be used by other organizations to access your [facility] medical records  [id number]        Your Vitals Were     Pulse Temperature Respirations Height BMI (Body Mass Index)       70 97.4  F (36.3  C) (Oral) 16 1.727 m (5' 8\") 24.6 kg/m2        Blood Pressure from Last 3 Encounters:   17 127/73   17 127/73   17 122/69    Weight from Last 3 Encounters:   17 73.4 kg (161 lb 12.8 oz)   17 75.3 kg (166 lb)   16 72.1 kg (159 lb)              We Performed the Following     CRP inflammation     Erythrocyte sedimentation rate auto     TSH with free T4 reflex          Where to get your medicines      Some of these will need a paper prescription and others can be bought over the counter.  Ask your nurse if you have questions.     Bring a paper prescription for each of these medications     HYDROcodone-acetaminophen 5-325 MG per tablet          Primary Care Provider Office Phone # Fax #    Cleo Henry [phone]       [facility] [address] [facility] [address]        Thank you!     Thank you for choosing " [facility]  for your care. Our goal is always to provide you with excellent care. Hearing back from our patients is one way we can continue to improve our services. Please take a few minutes to complete the written survey that you may receive in the mail after your visit with us. Thank you!             Your Updated Medication List - Protect others around you: Learn how to safely use, store and throw away your medicines at www.disposemymeds.org.          This list is accurate as of: 4/12/17 11:59 PM.  Always use your most recent med list.                   Brand Name Dispense Instructions for use    acetaminophen 325 MG tablet    TYLENOL    100 tablet    Take 2 tablets (650 mg) by mouth every 4 hours as needed for pain       ALOE VERA JUICE Liqd      2 oz twice daily       Alpha-Lipoic Acid 50 MG Caps      100-200 mg daily       ascorbic acid 500 MG tablet    VITAMIN C    30 tablet    Take 3 tablets (1,500 mg) by mouth daily       calcium-magnesium 500-250 MG Tabs per tablet    CALMAG     Take 1 tablet by mouth daily       diazepam 5 MG tablet    VALIUM    60 tablet    Take 1 tablet (5 mg) by mouth every 12 hours as needed for anxiety or sleep       HYDROcodone-acetaminophen 5-325 MG per tablet    NORCO    240 tablet    Take 1-2 tablets by mouth every 6 hours as needed for moderate to severe pain or pain       ibuprofen 600 MG tablet    ADVIL/MOTRIN    90 tablet    Take 1 tablet (600 mg) by mouth every 6 hours as needed for pain       Krill Oil 1000 MG Caps      Taking 2 daily.       niacin 500 MG tablet      Take 1 tablet (500 mg) by mouth daily (with breakfast) Takes 2000 mg daily       * OVER-THE-COUNTER      Tumeric       * OVER-THE-COUNTER      Megastress B with vitamin C 2 tablets daily       * OVER-THE-COUNTER      Powdered barely grass 1 tablespoon daily       * OVER-THE-COUNTER      Bio-Flex.       * OVER-THE-COUNTER      Ashwagandha 400 mg daily       * predniSONE 1 MG tablet    DELTASONE     120 tablet    Taper as directed from 12.5 mg       * predniSONE 5 MG tablet    DELTASONE    120 tablet    Take 2 tablets (10 mg) by mouth daily Patient is tapering from 11 mg daily--currently will need to take 2 5 mg tablets daily       vitamin D 2000 UNITS tablet     100 tablet    Take 2,000 Units by mouth Twice weekly       * Notice:  This list has 7 medication(s) that are the same as other medications prescribed for you. Read the directions carefully, and ask your doctor or other care provider to review them with you.

## 2017-04-13 NOTE — PROGRESS NOTES
Ms. Shaheed Zuñiga is seen back in followup for her seropositive rheumatoid arthritis.  She just received her fourth Orencia infusion today.  I walk into the room and she is falling asleep.  She tells me that every time she gets the infusion she will feel tired for several hours but overall has noticed an increased level of fatigue.  She says that she is sleeping through the night, although occasionally the pain awakens her and denies having any symptoms of sleep apnea.  She does think the Orencia is working because she has gotten her dose of prednisone down to 7 mg a day.  Which she tried to go down to 6 mg a day she had a flare involving her ankle.  Despite this though she admits that there is still swelling in the left knee and right ankle and there is still some discomfort.  The hands actually are doing better.      PHYSICAL EXAMINATION:  Blood pressure 127/73, pulse 70.  Patient does appear fatigued.  There is no synovitis of the wrists or MCPs.  There is still an effusion with moderate synovitis of the left knee and mild to moderate synovitis of the right ankle.  Skin is without lesions.      IMPRESSION:  Seropositive rheumatoid arthritis.      RECOMMENDATIONS:   1.  I am going to see if we can increase the Orencia to the top dose to 1000 mg monthly.   2.  Try to taper the prednisone as able.     3.  Because of how fatigued she looks I think we should check her thyroid.  We will check this, a sed rate and a CRP today.   4.  Follow up with me in 2 months.         KARIME VINCENT MD             D: 2017 15:48   T: 2017 12:48   MT:       Name:     SHAHEED ZUÑIGA   MRN:      [id number]        Account:      [id number]   :      1950           Visit Date:   2017      Document: [id number]

## 2017-05-10 ENCOUNTER — INFUSION THERAPY VISIT (OUTPATIENT)
Dept: INFUSION THERAPY | Facility: CLINIC | Age: 67
End: 2017-05-10
Attending: INTERNAL MEDICINE
Payer: MEDICARE

## 2017-05-10 VITALS — SYSTOLIC BLOOD PRESSURE: 112 MMHG | DIASTOLIC BLOOD PRESSURE: 64 MMHG | TEMPERATURE: 96.8 F | HEART RATE: 62 BPM

## 2017-05-10 DIAGNOSIS — M06.9 RHEUMATOID ARTHRITIS INVOLVING MULTIPLE SITES, UNSPECIFIED RHEUMATOID FACTOR PRESENCE: Primary | ICD-10-CM

## 2017-05-10 PROCEDURE — 96365 THER/PROPH/DIAG IV INF INIT: CPT

## 2017-05-10 PROCEDURE — 25000128 H RX IP 250 OP 636: Performed by: INTERNAL MEDICINE

## 2017-05-10 RX ORDER — ACETAMINOPHEN 325 MG/1
650 TABLET ORAL ONCE
Status: CANCELLED
Start: 2017-05-10 | End: 2017-05-10

## 2017-05-10 RX ORDER — DIPHENHYDRAMINE HCL 25 MG
25 CAPSULE ORAL ONCE
Status: CANCELLED
Start: 2017-05-10 | End: 2017-05-10

## 2017-05-10 RX ADMIN — SODIUM CHLORIDE 1000 MG: 9 INJECTION, SOLUTION INTRAVENOUS at 14:38

## 2017-05-10 RX ADMIN — SODIUM CHLORIDE 250 ML: 9 INJECTION, SOLUTION INTRAVENOUS at 14:35

## 2017-05-10 NOTE — MR AVS SNAPSHOT
"              After Visit Summary   5/10/2017    Leanne Zuñiga    MRN: [id number]           Patient Information     Date Of Birth          1950        Visit Information        Provider Department      5/10/2017 2:00 PM ROOM 4 [facility]        Today's Diagnoses     Rheumatoid arthritis involving multiple sites, unspecified rheumatoid factor presence (H)    -  1       Follow-ups after your visit        Your next 10 appointments already scheduled     Jun 07, 2017  1:00 PM CDT   Level 1 with ROOM 9 [facility] ([facility])    n Medical Ctr [facility]  [address]  [facility] [address]   [phone]            Jun 07, 2017  2:30 PM CDT   Return Visit with Nate Raines MD   [facility] ([facility]    [address] [facility] [address] [phone]              Who to contact     If you have questions or need follow up information about today's clinic visit or your schedule please contact [facility] directly at [phone].  Normal or non-critical lab and imaging results will be communicated to you by PSafehart, letter or phone within 4 business days after the clinic has received the results. If you do not hear from us within 7 days, please contact the clinic through myCampusTutorst or phone. If you have a critical or abnormal lab result, we will notify you by phone as soon as possible.  Submit refill requests through Forest2Market or call your pharmacy and they will forward the refill request to us. Please allow 3 business days for your refill to be completed.          Additional Information About Your Visit        MyChart Information     Forest2Market lets you send messages to your doctor, view your test results, renew your prescriptions, schedule appointments and more. To sign up, go to [email or web address] . Click on \"Log in\" on the left side of the screen, which will take you to the Welcome " "page. Then click on \"Sign up Now\" on the right side of the page.     You will be asked to enter the access code listed below, as well as some personal information. Please follow the directions to create your username and password.     Your access code is: [email or web address]  Expires: 2017  4:08 PM     Your access code will  in 90 days. If you need help or a new code, please call your [facility] clinic or [phone].        Care EveryWhere ID     This is your Care EveryWhere ID. This could be used by other organizations to access your [facility] medical records  [id number]        Your Vitals Were     Pulse Temperature                62 96.8  F (36  C) (Oral)           Blood Pressure from Last 3 Encounters:   05/10/17 112/64   17 127/73   17 127/73    Weight from Last 3 Encounters:   17 73.4 kg (161 lb 12.8 oz)   17 75.3 kg (166 lb)   16 72.1 kg (159 lb)              We Performed the Following     Treatment Conditions     Treatment Conditions     Treatment Conditions        Primary Care Provider Office Phone # Fax #    Cleo Henry [phone]       [facility] [address] [id number]        Thank you!     Thank you for choosing [facility]  for your care. Our goal is always to provide you with excellent care. Hearing back from our patients is one way we can continue to improve our services. Please take a few minutes to complete the written survey that you may receive in the mail after your visit with us. Thank you!             Your Updated Medication List - Protect others around you: Learn how to safely use, store and throw away your medicines at www.disposemymeds.org.          This list is accurate as of: 5/10/17  3:55 PM.  Always use your most recent med list.                   Brand Name Dispense Instructions for use    acetaminophen 325 MG tablet    TYLENOL    100 tablet    Take 2 tablets (650 mg) by mouth every 4 hours as " needed for pain       ALOE VERA JUICE Liqd      2 oz twice daily       Alpha-Lipoic Acid 50 MG Caps      100-200 mg daily       ascorbic acid 500 MG tablet    VITAMIN C    30 tablet    Take 3 tablets (1,500 mg) by mouth daily       calcium-magnesium 500-250 MG Tabs per tablet    CALMAG     Take 1 tablet by mouth daily       diazepam 5 MG tablet    VALIUM    60 tablet    Take 1 tablet (5 mg) by mouth every 12 hours as needed for anxiety or sleep       HYDROcodone-acetaminophen 5-325 MG per tablet    NORCO    240 tablet    Take 1-2 tablets by mouth every 6 hours as needed for moderate to severe pain or pain       ibuprofen 600 MG tablet    ADVIL/MOTRIN    90 tablet    Take 1 tablet (600 mg) by mouth every 6 hours as needed for pain       Krill Oil 1000 MG Caps      Taking 2 daily.       niacin 500 MG tablet      Take 1 tablet (500 mg) by mouth daily (with breakfast) Takes 2000 mg daily       * OVER-THE-COUNTER      Tumeric       * OVER-THE-COUNTER      Megastress B with vitamin C 2 tablets daily       * OVER-THE-COUNTER      Powdered barely grass 1 tablespoon daily       * OVER-THE-COUNTER      Bio-Flex.       * OVER-THE-COUNTER      Ashwagandha 400 mg daily       * predniSONE 1 MG tablet    DELTASONE    120 tablet    Taper as directed from 12.5 mg       * predniSONE 5 MG tablet    DELTASONE    120 tablet    Take 2 tablets (10 mg) by mouth daily Patient is tapering from 11 mg daily--currently will need to take 2 5 mg tablets daily       vitamin D 2000 UNITS tablet     100 tablet    Take 2,000 Units by mouth Twice weekly       * Notice:  This list has 7 medication(s) that are the same as other medications prescribed for you. Read the directions carefully, and ask your doctor or other care provider to review them with you.

## 2017-05-10 NOTE — PROGRESS NOTES
"Infusion Nursing Note:  Leanne Zuñiga presents today for Orencia.    Patient seen by provider today: No   present during visit today: Not Applicable.    Note: N/A.    Intravenous Access:  Peripheral IV placed.    Treatment Conditions:  Rheumatology Infusion Checklist: PRIOR TO INFUSION OF BIOLOGICAL MEDICATIONS OR ANY OF THESE AS LISTED: Orencia (abatacept) \".rheumbiologicalchecklist\"    Prior to Infusion of biological medications or any of these as listed:    1. Elevated temperature, fever, chills, productive cough or abnormal vital signs, night sweats, coughing up blood or sputum, no appetite or abnormal vital signs : NO    2. Open wounds or new incisions: NO    3. Recent hospitalization: NO    4.  Recent surgeries:  NO    5. Any upcoming surgeries or dental procedures?:NO    6. Any current or recent bouts of illness or infection? On any antibiotics? : NO    7. Any new, sudden or worsening abdominal pain :NO    8. Vaccination within 4 weeks? Patient or someone in the household is scheduled to receive vaccination? No live virus vaccines prior to or during treatment :NO    9. Any nervous system diseases [i.e. multiple sclerosis, Guillain-[place], seizures, neurological  changes]: NO    10. Pregnant or breast feeding; or plans on pregnancy in the future: NO    11. Signs of worsening depression or suicidal ideations while taking benlysta:NO    12. New-onset medical symptoms: NO    13.  New cancer diagnosis or on chemotherapy or radiation NO    14.  Evaluate for any sign of active TB [Unexplained weight loss, Loss of appetite, Night sweats, Fever, Fatigue, Chills, Coughing for 3 weeks or longer, Hemoptysis (coughing up blood), Chest pain]: NO    **Note: If answered yes to any of the above, hold the infusion and contact ordering rheumatologist or on-call rheumatologist.   .      Post Infusion Assessment:  Patient tolerated infusion without incident.  Blood return noted pre and post infusion.  Site patent " and intact, free from redness, edema or discomfort.  No evidence of extravasations.  Access discontinued per protocol.    Discharge Plan:   Patient discharged in stable condition accompanied by: self.  Departure Mode: Ambulatory.    Brandi Badillo RN

## 2017-06-07 ENCOUNTER — OFFICE VISIT (OUTPATIENT)
Dept: RHEUMATOLOGY | Facility: CLINIC | Age: 67
End: 2017-06-07
Payer: MEDICARE

## 2017-06-07 ENCOUNTER — RADIANT APPOINTMENT (OUTPATIENT)
Dept: GENERAL RADIOLOGY | Facility: CLINIC | Age: 67
End: 2017-06-07
Attending: INTERNAL MEDICINE
Payer: MEDICARE

## 2017-06-07 ENCOUNTER — INFUSION THERAPY VISIT (OUTPATIENT)
Dept: INFUSION THERAPY | Facility: CLINIC | Age: 67
End: 2017-06-07
Attending: INTERNAL MEDICINE
Payer: MEDICARE

## 2017-06-07 VITALS — TEMPERATURE: 97.9 F | DIASTOLIC BLOOD PRESSURE: 71 MMHG | SYSTOLIC BLOOD PRESSURE: 122 MMHG | HEART RATE: 65 BPM

## 2017-06-07 VITALS
BODY MASS INDEX: 23.57 KG/M2 | DIASTOLIC BLOOD PRESSURE: 71 MMHG | RESPIRATION RATE: 14 BRPM | TEMPERATURE: 97.9 F | WEIGHT: 155 LBS | HEART RATE: 65 BPM | SYSTOLIC BLOOD PRESSURE: 122 MMHG

## 2017-06-07 DIAGNOSIS — M06.9 RHEUMATOID ARTHRITIS INVOLVING MULTIPLE SITES, UNSPECIFIED RHEUMATOID FACTOR PRESENCE: Primary | ICD-10-CM

## 2017-06-07 DIAGNOSIS — M06.9 RHEUMATOID ARTHRITIS INVOLVING MULTIPLE SITES, UNSPECIFIED RHEUMATOID FACTOR PRESENCE: ICD-10-CM

## 2017-06-07 PROCEDURE — 96365 THER/PROPH/DIAG IV INF INIT: CPT

## 2017-06-07 PROCEDURE — 73562 X-RAY EXAM OF KNEE 3: CPT | Mod: LT

## 2017-06-07 PROCEDURE — 99214 OFFICE O/P EST MOD 30 MIN: CPT | Performed by: INTERNAL MEDICINE

## 2017-06-07 PROCEDURE — 73610 X-RAY EXAM OF ANKLE: CPT | Mod: LT

## 2017-06-07 PROCEDURE — 25000128 H RX IP 250 OP 636: Performed by: INTERNAL MEDICINE

## 2017-06-07 RX ORDER — DIAZEPAM 5 MG
5 TABLET ORAL EVERY 12 HOURS PRN
Qty: 60 TABLET | Refills: 0 | Status: ON HOLD | OUTPATIENT
Start: 2017-06-07 | End: 2023-04-28

## 2017-06-07 RX ORDER — ACETAMINOPHEN 325 MG/1
650 TABLET ORAL ONCE
Status: CANCELLED
Start: 2017-06-07 | End: 2017-06-07

## 2017-06-07 RX ORDER — DIPHENHYDRAMINE HCL 25 MG
25 CAPSULE ORAL ONCE
Status: CANCELLED
Start: 2017-06-07 | End: 2017-06-07

## 2017-06-07 RX ADMIN — SODIUM CHLORIDE 1000 MG: 9 INJECTION, SOLUTION INTRAVENOUS at 13:36

## 2017-06-07 NOTE — MR AVS SNAPSHOT
After Visit Summary   6/7/2017    Leanne Zuñiga    MRN: [id number]           Patient Information     Date Of Birth          1950        Visit Information        Provider Department      6/7/2017 1:00 PM ROOM 9 [facility]        Today's Diagnoses     Rheumatoid arthritis involving multiple sites, unspecified rheumatoid factor presence (H)    -  1       Follow-ups after your visit        Your next 10 appointments already scheduled     Jul 05, 2017  2:30 PM CDT   Level 1 with ROOM 10 [facility] ([facility])    [facility]  [address]  [facility] [address]   [phone]            Aug 02, 2017  1:00 PM CDT   Level 1 with ROOM 2 [facility] ([facility])    [facility]  [address]  [facility] [address]   [phone]            Aug 30, 2017  1:00 PM CDT   Level 1 with ROOM 2 [facility] ([facility])    [facility]  [address]  [facility] [address]   [phone]              Who to contact     If you have questions or need follow up information about today's clinic visit or your schedule please contact [facility] directly at [phone].  Normal or non-critical lab and imaging results will be communicated to you by ProFund[employer]hart, letter or phone within 4 business days after the clinic has received the results. If you do not hear from us within 7 days, please contact the clinic through MyChart or phone. If you have a critical or abnormal lab result, we will notify you by phone as soon as possible.  Submit refill requests through K[employer]eta or call your pharmacy and they will forward the refill request to us. Please allow 3 business days for your refill to be completed.          Additional Information About Your Visit        Kymeta Information     Kymeta lets you send messages to  "your doctor, view your test results, renew your prescriptions, schedule appointments and more. To sign up, go to [email or web address] . Click on \"Log in\" on the left side of the screen, which will take you to the Welcome page. Then click on \"Sign up Now\" on the right side of the page.     You will be asked to enter the access code listed below, as well as some personal information. Please follow the directions to create your username and password.     Your access code is: [email or web address]  Expires: 2017  4:50 PM     Your access code will  in 90 days. If you need help or a new code, please call your [place] clinic or [phone].        Care EveryWhere ID     This is your Care EveryWhere ID. This could be used by other organizations to access your [place] medical records  [id number]        Your Vitals Were     Pulse Temperature                65 97.9  F (36.6  C) (Oral)           Blood Pressure from Last 3 Encounters:   17 122/71   17 122/71   05/10/17 112/64    Weight from Last 3 Encounters:   17 70.3 kg (155 lb)   17 73.4 kg (161 lb 12.8 oz)   17 75.3 kg (166 lb)              Today, you had the following     No orders found for display         Where to get your medicines      Some of these will need a paper prescription and others can be bought over the counter.  Ask your nurse if you have questions.     Bring a paper prescription for each of these medications     diazepam 5 MG tablet          Primary Care Provider Office Phone # Fax #    Cleo ADAMS Carl [phone]       [facility] [address] [facility] [address]        Thank you!     Thank you for choosing [facility]  for your care. Our goal is always to provide you with excellent care. Hearing back from our patients is one way we can continue to improve our services. Please take a few minutes to complete the written survey that you may receive in the mail after your visit " with us. Thank you!             Your Updated Medication List - Protect others around you: Learn how to safely use, store and throw away your medicines at www.disposemymeds.org.          This list is accurate as of: 6/7/17  4:50 PM.  Always use your most recent med list.                   Brand Name Dispense Instructions for use    acetaminophen 325 MG tablet    TYLENOL    100 tablet    Take 2 tablets (650 mg) by mouth every 4 hours as needed for pain       ALOE VERA JUICE Liqd      2 oz twice daily       Alpha-Lipoic Acid 50 MG Caps      100-200 mg daily       ascorbic acid 500 MG tablet    VITAMIN C    30 tablet    Take 3 tablets (1,500 mg) by mouth daily       calcium-magnesium 500-250 MG Tabs per tablet    CALMAG     Take 1 tablet by mouth daily       diazepam 5 MG tablet    VALIUM    60 tablet    Take 1 tablet (5 mg) by mouth every 12 hours as needed for anxiety or sleep       HYDROcodone-acetaminophen 5-325 MG per tablet    NORCO    240 tablet    Take 1-2 tablets by mouth every 6 hours as needed for moderate to severe pain or pain       ibuprofen 600 MG tablet    ADVIL/MOTRIN    90 tablet    Take 1 tablet (600 mg) by mouth every 6 hours as needed for pain       Krill Oil 1000 MG Caps      Taking 2 daily.       niacin 500 MG tablet      Take 1 tablet (500 mg) by mouth daily (with breakfast) Takes 2000 mg daily       * OVER-THE-COUNTER      Tumeric       * OVER-THE-COUNTER      Megastress B with vitamin C 2 tablets daily       * OVER-THE-COUNTER      Powdered barely grass 1 tablespoon daily       * OVER-THE-COUNTER      Bio-Flex.       * OVER-THE-COUNTER      Ashwagandha 400 mg daily       * predniSONE 1 MG tablet    DELTASONE    120 tablet    Taper as directed from 12.5 mg       * predniSONE 5 MG tablet    DELTASONE    120 tablet    Take 2 tablets (10 mg) by mouth daily Patient is tapering from 11 mg daily--currently will need to take 2 5 mg tablets daily       vitamin D 2000 UNITS tablet     100 tablet    Take  2,000 Units by mouth Twice weekly       * Notice:  This list has 7 medication(s) that are the same as other medications prescribed for you. Read the directions carefully, and ask your doctor or other care provider to review them with you.

## 2017-06-07 NOTE — MR AVS SNAPSHOT
After Visit Summary   6/7/2017    Leanne Zuñiga    MRN: [id number]           Patient Information     Date Of Birth          1950        Visit Information        Provider Department      6/7/2017 2:30 PM Nate Raines MD [facility]        Today's Diagnoses     Rheumatoid arthritis involving multiple sites, unspecified rheumatoid factor presence (H)    -  1       Follow-ups after your visit        Your next 10 appointments already scheduled     Jul 05, 2017  2:30 PM CDT   Level 1 with ROOM 10 [facility] ([facility])    [facility]  [address]  [facility] [address]   [phone]            Aug 02, 2017  1:00 PM CDT   Level 1 with ROOM 2 [facility] ([facility])    [facility]  [address]  [facility] [address]   [phone]            Aug 30, 2017  1:00 PM CDT   Level 1 with ROOM 2 [facility] ([facility])    [facility]  [address]  [facility] [address]   [phone]              Who to contact     If you have questions or need follow up information about today's clinic visit or your schedule please contact [facility] directly at [phone].  Normal or non-critical lab and imaging results will be communicated to you by MyChart, letter or phone within 4 business days after the clinic has received the results. If you do not hear from us within 7 days, please contact the clinic through MyChart or phone. If you have a critical or abnormal lab result, we will notify you by phone as soon as possible.  Submit refill requests through [employer] or call your pharmacy and they will forward the refill request to us. Please allow 3 business days for your refill to be completed.          Additional Information About Your Visit        MyChart Information     [facility] lets  "you send messages to your doctor, view your test results, renew your prescriptions, schedule appointments and more. To sign up, go to [email or web address] . Click on \"Log in\" on the left side of the screen, which will take you to the Welcome page. Then click on \"Sign up Now\" on the right side of the page.     You will be asked to enter the access code listed below, as well as some personal information. Please follow the directions to create your username and password.     Your access code is: [email or web address]  Expires: 2017  4:50 PM     Your access code will  in 90 days. If you need help or a new code, please call your [place] clinic or [phone].        Care EveryWhere ID     This is your Care EveryWhere ID. This could be used by other organizations to access your [place] medical records  [id number]        Your Vitals Were     Pulse Temperature Respirations BMI (Body Mass Index)          65 97.9  F (36.6  C) 14 23.57 kg/m2         Blood Pressure from Last 3 Encounters:   17 122/71   17 122/71   05/10/17 112/64    Weight from Last 3 Encounters:   17 155 lb (70.3 kg)   17 161 lb 12.8 oz (73.4 kg)   17 166 lb (75.3 kg)                 Where to get your medicines      Some of these will need a paper prescription and others can be bought over the counter.  Ask your nurse if you have questions.     Bring a paper prescription for each of these medications     diazepam 5 MG tablet          Primary Care Provider Office Phone # Fax #    Cleo Henry [phone]       [facility] [address] [facility] [address]        Thank you!     Thank you for choosing [facility]  for your care. Our goal is always to provide you with excellent care. Hearing back from our patients is one way we can continue to improve our services. Please take a few minutes to complete the written survey that you may receive in the mail after your visit with us. " Thank you!             Your Updated Medication List - Protect others around you: Learn how to safely use, store and throw away your medicines at www.disposemymeds.org.          This list is accurate as of: 6/7/17 11:59 PM.  Always use your most recent med list.                   Brand Name Dispense Instructions for use    acetaminophen 325 MG tablet    TYLENOL    100 tablet    Take 2 tablets (650 mg) by mouth every 4 hours as needed for pain       ALOE VERA JUICE Liqd      2 oz twice daily       Alpha-Lipoic Acid 50 MG Caps      100-200 mg daily       ascorbic acid 500 MG tablet    VITAMIN C    30 tablet    Take 3 tablets (1,500 mg) by mouth daily       calcium-magnesium 500-250 MG Tabs per tablet    CALMAG     Take 1 tablet by mouth daily       diazepam 5 MG tablet    VALIUM    60 tablet    Take 1 tablet (5 mg) by mouth every 12 hours as needed for anxiety or sleep       HYDROcodone-acetaminophen 5-325 MG per tablet    NORCO    240 tablet    Take 1-2 tablets by mouth every 6 hours as needed for moderate to severe pain or pain       ibuprofen 600 MG tablet    ADVIL/MOTRIN    90 tablet    Take 1 tablet (600 mg) by mouth every 6 hours as needed for pain       Krill Oil 1000 MG Caps      Taking 2 daily.       niacin 500 MG tablet      Take 1 tablet (500 mg) by mouth daily (with breakfast) Takes 2000 mg daily       * OVER-THE-COUNTER      Tumeric       * OVER-THE-COUNTER      Megastress B with vitamin C 2 tablets daily       * OVER-THE-COUNTER      Powdered barely grass 1 tablespoon daily       * OVER-THE-COUNTER      Bio-Flex.       * OVER-THE-COUNTER      Ashwagandha 400 mg daily       * predniSONE 1 MG tablet    DELTASONE    120 tablet    Taper as directed from 12.5 mg       * predniSONE 5 MG tablet    DELTASONE    120 tablet    Take 2 tablets (10 mg) by mouth daily Patient is tapering from 11 mg daily--currently will need to take 2 5 mg tablets daily       vitamin D 2000 UNITS tablet     100 tablet    Take 2,000  Units by mouth Twice weekly       * Notice:  This list has 7 medication(s) that are the same as other medications prescribed for you. Read the directions carefully, and ask your doctor or other care provider to review them with you.

## 2017-06-07 NOTE — PROGRESS NOTES
Infusion Nursing Note:  Leanne Zuñiga presents today for Orencia.    Patient seen by provider today: No   present during visit today: Not Applicable.    Note: N/A.    Intravenous Access:  Peripheral IV placed.    Treatment Conditions:  Rheumatology Infusion Checklist: PRIOR TO INFUSION OF BIOLOGICAL MEDICATIONS OR ANY OF THESE AS LISTED: Orencia (abatacept)     Prior to Infusion of biological medications or any of these as listed:    1. Elevated temperature, fever, chills, productive cough or abnormal vital signs, night sweats, coughing up blood or sputum, no appetite or abnormal vital signs : NO    2. Open wounds or new incisions: NO    3. Recent hospitalization: NO    4.  Recent surgeries:  NO    5. Any upcoming surgeries or dental procedures?:NO    6. Any current or recent bouts of illness or infection? On any antibiotics? : NO    7. Any new, sudden or worsening abdominal pain :NO    8. Vaccination within 4 weeks? Patient or someone in the household is scheduled to receive vaccination? No live virus vaccines prior to or during treatment :NO    9. Any nervous system diseases [i.e. multiple sclerosis, Guillain-Yoder, seizures, neurological  changes]: NO    10. Pregnant or breast feeding; or plans on pregnancy in the future: NO    11. Signs of worsening depression or suicidal ideations while taking benlysta:NO    12. New-onset medical symptoms: NO    13.  New cancer diagnosis or on chemotherapy or radiation NO    14.  Evaluate for any sign of active TB [Unexplained weight loss, Loss of appetite, Night sweats, Fever, Fatigue, Chills, Coughing for 3 weeks or longer, Hemoptysis (coughing up blood), Chest pain]: NO    **Note: If answered yes to any of the above, hold the infusion and contact ordering rheumatologist or on-call rheumatologist.   .      Post Infusion Assessment:  Patient tolerated infusion without incident.  Access discontinued per protocol.    Discharge Plan:   Patient discharged in stable  condition accompanied by: self.  Departure Mode: Ambulatory.  Next dose is scheduled for 7/5.    Pretty Moody RN

## 2017-06-08 NOTE — PROGRESS NOTES
SUBJECTIVE:  Leanne Zuñiga is seen back in followup for her seropositive rheumatoid arthritis.  She is now on the top dose Orencia 1000 mg and has had 2 infusions.  Overall, she thinks she is doing somewhat better.  She has been able to get her dose of prednisone down to 7 mg a day, but cannot go lower than that because there is still pain and swelling involving her left knee and her left ankle that was severe enough that last week she had a flare-up that she could not get out of bed for several days because of the pain.  This did resolve slowly on its own by increasing her dose of prednisone.      At this point, she wants to give the Orencia more time to work.  She really does not want to be on anything else.  She has been intolerant of multiple meds.  When I mentioned the possibility of minocycline which is an antibiotic she would rather not take that approach either.  The Orencia infusions are going uneventfully and she is not having any obvious infections or other problems.      She is wondering about what is going on anatomically with her knee and wonders about getting x-rays which seems reasonable.  We may need to get an MRI to make certain there is not anything else anatomic to explain why the knee is not getting better when everything else appears to be.      PHYSICAL EXAMINATION:   VITAL SIGNS:  Blood pressure 122/71, pulse 65.     MUSCULOSKELETAL:  No longer is there any synovitis of the elbows or shoulders or wrists or MCPs, or PIPs.  There is still a large effusion with synovitis of the left knee, mild to moderate synovitis of the left ankle.   SKIN:  Without lesions.      IMPRESSION:   1.  Rheumatoid arthritis.      RECOMMENDATIONS:   1.  X-ray the knees and ankle today.     2.  Continue the Orencia infusion monthly.   3.  Try to get her prednisone dose down to 5 mg.  Certainly the dose of 6 she is on is acceptable.   4.  Again she is still going to consider possibility of having to add something to  the Orencia, although we are running out of options.   5.  We will refill her diazepam, which she used to help her sleep at night and for anxiety.   6.  Follow up with me in 2-3 months.         KARIEM VINCENT MD             D: 2017 17:03   T: 2017 07:37   MT: [id number]      Name:     SHAHEED BRIGGS   MRN:      [id number]        Account:      [id number]   :      1950           Visit Date:   2017      Document: [id number]       cc: Cleo Henry MD

## 2017-06-09 ENCOUNTER — TELEPHONE (OUTPATIENT)
Dept: RHEUMATOLOGY | Facility: CLINIC | Age: 67
End: 2017-06-09

## 2017-06-09 DIAGNOSIS — M06.9 RHEUMATOID ARTHRITIS INVOLVING LEFT KNEE, UNSPECIFIED RHEUMATOID FACTOR PRESENCE: Primary | ICD-10-CM

## 2017-06-09 NOTE — TELEPHONE ENCOUNTER
Called pt. And LM for her to call to let the MRI scheduling know when it would work to come in for the MRI.      Linda CHENEY LPN  [facility]  Rheumatology-Dr. Raines

## 2017-06-20 ENCOUNTER — TRANSFERRED RECORDS (OUTPATIENT)
Dept: HEALTH INFORMATION MANAGEMENT | Facility: CLINIC | Age: 67
End: 2017-06-20

## 2017-06-21 ENCOUNTER — MYC MEDICAL ADVICE (OUTPATIENT)
Dept: RHEUMATOLOGY | Facility: CLINIC | Age: 67
End: 2017-06-21

## 2017-06-21 NOTE — TELEPHONE ENCOUNTER
Let her know that the mri shows significant osteoarthritis in the knee as well as prominent inflammation/synovitis with fluid in the knee.  that means that there are 2 problems---rheumatoid arthritis and the knee is wearing out.  At some point may need to see a surgeon   If we can get the inflammation down in the knee it should get somewhat better but the knee is wearing out so there will still be some pain although hard to say how much

## 2017-06-22 NOTE — TELEPHONE ENCOUNTER
I spoke to Leanne and let her know that Dr Raines would aspirate and inject her knee. She would like to review the MRI with Dr Rubio in clinic and talk about how soon he thinks that she needs surgery. Elvira LOPEZ RN

## 2017-06-22 NOTE — TELEPHONE ENCOUNTER
I spoke to Leanne today. I gave her the message from Dr Raines. She said that she has an appointment scheduled with Dr Raines on August 2nd but is wondering if she should be seen sooner and if her knee should be aspirated. She also said that she had a large red spot on her back yesterday and her primary physician put her on Doxycycline. Blood work is not complete. Dr Raines , how do you advise? Elvira LOPEZ RN

## 2017-06-29 ENCOUNTER — OFFICE VISIT (OUTPATIENT)
Dept: RHEUMATOLOGY | Facility: CLINIC | Age: 67
End: 2017-06-29
Payer: MEDICARE

## 2017-06-29 VITALS
WEIGHT: 150 LBS | DIASTOLIC BLOOD PRESSURE: 60 MMHG | SYSTOLIC BLOOD PRESSURE: 109 MMHG | HEART RATE: 60 BPM | BODY MASS INDEX: 22.81 KG/M2

## 2017-06-29 DIAGNOSIS — M06.9 RHEUMATOID ARTHRITIS INVOLVING MULTIPLE SITES, UNSPECIFIED RHEUMATOID FACTOR PRESENCE: Primary | ICD-10-CM

## 2017-06-29 PROCEDURE — 20610 DRAIN/INJ JOINT/BURSA W/O US: CPT | Mod: LT | Performed by: INTERNAL MEDICINE

## 2017-06-29 PROCEDURE — 99213 OFFICE O/P EST LOW 20 MIN: CPT | Mod: 25 | Performed by: INTERNAL MEDICINE

## 2017-06-29 NOTE — NURSING NOTE
"Chief Complaint   Patient presents with     RECHECK       Initial /60  Pulse 60  Wt 150 lb (68 kg)  BMI 22.81 kg/m2 Estimated body mass index is 22.81 kg/(m^2) as calculated from the following:    Height as of 4/12/17: 5' 8\" (1.727 m).    Weight as of this encounter: 150 lb (68 kg).      Patient prefers to be contacted via at Home.   Okay to leave detailed message: Yes  Patient uses MyChart: No    Linda CHENEY LPN    "

## 2017-06-29 NOTE — MR AVS SNAPSHOT
After Visit Summary   6/29/2017    Leanne Zuñiga    MRN: [id number]           Patient Information     Date Of Birth          1950        Visit Information        Provider Department      6/29/2017 10:30 AM Nate Raines MD [facility]         Follow-ups after your visit        Your next 10 appointments already scheduled     Jul 05, 2017  2:30 PM CDT   Level 1 with ROOM 10 [facility] ([facility])    [facility]  [address]  [facility] [address]   [phone]            Aug 02, 2017  1:00 PM CDT   Level 1 with ROOM 2 [facility] ([facility])    [facility]  [address]  [facility] [address]   [phone]            Aug 02, 2017  3:00 PM CDT   Return Visit with Nate Raines MD   [facility] ([facility]    [address] [facility] [address]   [phone]            Aug 30, 2017  1:00 PM CDT   Level 1 with ROOM 2 [facility] ([facility])    [facility]  [address]  [facility] [address]   [phone]              Who to contact     If you have questions or need follow up information about today's clinic visit or your schedule please contact [facility] directly at [phone].  Normal or non-critical lab and imaging results will be communicated to you by MyChart, letter or phone within 4 business days after the clinic has received the results. If you do not hear from us within 7 days, please contact the clinic through MyChart or phone. If you have a critical or abnormal lab result, we will notify you by phone as soon as possible.  Submit refill requests through Implanet or call your pharmacy and they will forward the refill request to us. Please allow 3 business days for your refill to be completed.           "Additional Information About Your Visit        MyChart Information     ShiftPlanning lets you send messages to your doctor, view your test results, renew your prescriptions, schedule appointments and more. To sign up, go to [email or web address] . Click on \"Log in\" on the left side of the screen, which will take you to the Welcome page. Then click on \"Sign up Now\" on the right side of the page.     You will be asked to enter the access code listed below, as well as some personal information. Please follow the directions to create your username and password.     Your access code is: [email or web address]  Expires: 2017  4:50 PM     Your access code will  in 90 days. If you need help or a new code, please call your [place] clinic or [phone].        Care EveryWhere ID     This is your Care EveryWhere ID. This could be used by other organizations to access your [place] medical records  [id number]        Your Vitals Were     Pulse BMI (Body Mass Index)                60 22.81 kg/m2           Blood Pressure from Last 3 Encounters:   17 109/60   17 122/71   17 122/71    Weight from Last 3 Encounters:   17 150 lb (68 kg)   17 155 lb (70.3 kg)   17 161 lb 12.8 oz (73.4 kg)              Today, you had the following     No orders found for display       Primary Care Provider Office Phone # Fax #    Cleo Henry [phone]       [address] [address]        Equal Access to Services     [facility] CenterENRRIQUE : Hadii nani raymond Sobalaji, waaxda luqadaha, qaybta kaalmalissy boykin. So [facility] [phone].    ATENCIÓN: Si habla español, tiene a arias disposición servicios gratuitos de asistencia lingüística. Llame al [phone].    We comply with applicable federal civil rights laws and [place] laws. We do not discriminate on the basis of race, color, national origin, age, disability sex, sexual " orientation or gender identity.            Thank you!     Thank you for choosing [facility]  for your care. Our goal is always to provide you with excellent care. Hearing back from our patients is one way we can continue to improve our services. Please take a few minutes to complete the written survey that you may receive in the mail after your visit with us. Thank you!             Your Updated Medication List - Protect others around you: Learn how to safely use, store and throw away your medicines at www.disposemymeds.org.          This list is accurate as of: 6/29/17 11:32 AM.  Always use your most recent med list.                   Brand Name Dispense Instructions for use Diagnosis    acetaminophen 325 MG tablet    TYLENOL    100 tablet    Take 2 tablets (650 mg) by mouth every 4 hours as needed for pain    RA (rheumatoid arthritis) (H)       ALOE VERA JUICE Liqd      2 oz twice daily    RA (rheumatoid arthritis) (H)       Alpha-Lipoic Acid 50 MG Caps      100-200 mg daily    RA (rheumatoid arthritis) (H)       ascorbic acid 500 MG tablet    VITAMIN C    30 tablet    Take 3 tablets (1,500 mg) by mouth daily    RA (rheumatoid arthritis) (H)       calcium-magnesium 500-250 MG Tabs per tablet    CALMAG     Take 1 tablet by mouth daily    RA (rheumatoid arthritis) (H)       diazepam 5 MG tablet    VALIUM    60 tablet    Take 1 tablet (5 mg) by mouth every 12 hours as needed for anxiety or sleep    Rheumatoid arthritis involving multiple sites, unspecified rheumatoid factor presence (H)       HYDROcodone-acetaminophen 5-325 MG per tablet    NORCO    240 tablet    Take 1-2 tablets by mouth every 6 hours as needed for moderate to severe pain or pain    Rash and nonspecific skin eruption       ibuprofen 600 MG tablet    ADVIL/MOTRIN    90 tablet    Take 1 tablet (600 mg) by mouth every 6 hours as needed for pain    RA (rheumatoid arthritis) (H)       Krill Oil 1000 MG Caps      Taking 2 daily.        niacin  500 MG tablet      Take 1 tablet (500 mg) by mouth daily (with breakfast) Takes 2000 mg daily    RA (rheumatoid arthritis) (H)       * OVER-THE-COUNTER      Tumeric        * OVER-THE-COUNTER      Megastress B with vitamin C 2 tablets daily    RA (rheumatoid arthritis) (H)       * OVER-THE-COUNTER      Powdered barely grass 1 tablespoon daily    RA (rheumatoid arthritis) (H)       * OVER-THE-COUNTER      Bio-Flex.    RA (rheumatoid arthritis) (H)       * OVER-THE-COUNTER      Ashwagandha 400 mg daily    RA (rheumatoid arthritis) (H)       * predniSONE 1 MG tablet    DELTASONE    120 tablet    Taper as directed from 12.5 mg    Rheumatoid arthritis of multiple sites without rheumatoid factor (H)       * predniSONE 5 MG tablet    DELTASONE    120 tablet    Take 2 tablets (10 mg) by mouth daily Patient is tapering from 11 mg daily--currently will need to take 2 5 mg tablets daily    Rheumatoid arthritis involving multiple sites, unspecified rheumatoid factor presence (H)       vitamin D 2000 UNITS tablet     100 tablet    Take 2,000 Units by mouth Twice weekly    RA (rheumatoid arthritis) (H)       * Notice:  This list has 7 medication(s) that are the same as other medications prescribed for you. Read the directions carefully, and ask your doctor or other care provider to review them with you.

## 2017-07-05 ENCOUNTER — INFUSION THERAPY VISIT (OUTPATIENT)
Dept: INFUSION THERAPY | Facility: CLINIC | Age: 67
End: 2017-07-05
Attending: INTERNAL MEDICINE
Payer: MEDICARE

## 2017-07-05 VITALS — TEMPERATURE: 97.8 F | SYSTOLIC BLOOD PRESSURE: 125 MMHG | HEART RATE: 60 BPM | DIASTOLIC BLOOD PRESSURE: 73 MMHG

## 2017-07-05 DIAGNOSIS — M06.9 RHEUMATOID ARTHRITIS INVOLVING MULTIPLE SITES, UNSPECIFIED RHEUMATOID FACTOR PRESENCE: Primary | ICD-10-CM

## 2017-07-05 PROCEDURE — 25000128 H RX IP 250 OP 636: Performed by: INTERNAL MEDICINE

## 2017-07-05 PROCEDURE — 96365 THER/PROPH/DIAG IV INF INIT: CPT

## 2017-07-05 RX ORDER — ACETAMINOPHEN 325 MG/1
650 TABLET ORAL ONCE
Status: CANCELLED
Start: 2017-07-05 | End: 2017-07-05

## 2017-07-05 RX ORDER — ACETAMINOPHEN 325 MG/1
650 TABLET ORAL ONCE
Status: DISCONTINUED | OUTPATIENT
Start: 2017-07-05 | End: 2017-07-05 | Stop reason: HOSPADM

## 2017-07-05 RX ORDER — DIPHENHYDRAMINE HCL 25 MG
25 CAPSULE ORAL ONCE
Status: DISCONTINUED | OUTPATIENT
Start: 2017-07-05 | End: 2017-07-05 | Stop reason: HOSPADM

## 2017-07-05 RX ORDER — DIPHENHYDRAMINE HCL 25 MG
25 CAPSULE ORAL ONCE
Status: CANCELLED
Start: 2017-07-05 | End: 2017-07-05

## 2017-07-05 RX ADMIN — SODIUM CHLORIDE 250 ML: 9 INJECTION, SOLUTION INTRAVENOUS at 14:55

## 2017-07-05 RX ADMIN — SODIUM CHLORIDE 1000 MG: 9 INJECTION, SOLUTION INTRAVENOUS at 14:55

## 2017-07-05 NOTE — MR AVS SNAPSHOT
After Visit Summary   7/5/2017    Leanne Zuñiga    MRN: [id number]           Patient Information     Date Of Birth          1950        Visit Information        Provider Department      7/5/2017 2:30 PM ROOM 10 [facility]        Today's Diagnoses     Rheumatoid arthritis involving multiple sites, unspecified rheumatoid factor presence (H)    -  1       Follow-ups after your visit        Your next 10 appointments already scheduled     Aug 02, 2017  1:00 PM CDT   Level 1 with ROOM 2 [facility] ([facility])    [facility]  [address]  [facility] [address]   [phone]            Aug 02, 2017  3:00 PM CDT   Return Visit with Nate Raines MD   [facility] ([facility])    [address] [facility] [address]   [phone]            Aug 30, 2017  1:00 PM CDT   Level 1 with ROOM 2 [facility] ([facility])    [facility]  [address]  [facility] [address]   [phone]              Who to contact     If you have questions or need follow up information about today's clinic visit or your schedule please contact [facility] directly at [phone].  Normal or non-critical lab and imaging results will be communicated to you by StARTinitiativehart, letter or phone within 4 business days after the clinic has received the results. If you do not hear from us within 7 days, please contact the clinic through MyChart or phone. If you have a critical or abnormal lab result, we will notify you by phone as soon as possible.  Submit refill requests through Milk Mantra or call your pharmacy and they will forward the refill request to us. Please allow 3 business days for your refill to be completed.          Additional Information About Your Visit        StARTinitiativeharGoEuro Information     Milk Mantra lets you send messages to your doctor,  "view your test results, renew your prescriptions, schedule appointments and more. To sign up, go to [email or web address] . Click on \"Log in\" on the left side of the screen, which will take you to the Welcome page. Then click on \"Sign up Now\" on the right side of the page.     You will be asked to enter the access code listed below, as well as some personal information. Please follow the directions to create your username and password.     Your access code is: [email or web address]  Expires: 2017  4:50 PM     Your access code will  in 90 days. If you need help or a new code, please call your [place] clinic or [phone].        Care EveryWhere ID     This is your Care EveryWhere ID. This could be used by other organizations to access your [place] medical records  [id number]        Your Vitals Were     Pulse Temperature                60 97.8  F (36.6  C) (Oral)           Blood Pressure from Last 3 Encounters:   17 125/73   17 109/60   17 122/71    Weight from Last 3 Encounters:   17 68 kg (150 lb)   17 70.3 kg (155 lb)   17 73.4 kg (161 lb 12.8 oz)              Today, you had the following     No orders found for display       Primary Care Provider Office Phone # Fax #    Cleo Henry [phone]       [facility] [address] [facility] [address]        Equal Access to Services     RADHA MADDOX AH: Hadii aad ku hadasho Soomaali, waaxda luqadaha, qaybta kaalmada adeegyada, waxkaela mckeon. So [facility] [phone].    ATENCIÓN: Si habla español, tiene a arias disposición servicios gratuitos de asistencia lingüística. Llame al [phone].    We comply with applicable federal civil rights laws and [place] laws. We do not discriminate on the basis of race, color, national origin, age, disability sex, sexual orientation or gender identity.            Thank you!     Thank you for choosing [facility]  for your " care. Our goal is always to provide you with excellent care. Hearing back from our patients is one way we can continue to improve our services. Please take a few minutes to complete the written survey that you may receive in the mail after your visit with us. Thank you!             Your Updated Medication List - Protect others around you: Learn how to safely use, store and throw away your medicines at www.disposemymeds.org.          This list is accurate as of: 7/5/17  4:56 PM.  Always use your most recent med list.                   Brand Name Dispense Instructions for use Diagnosis    acetaminophen 325 MG tablet    TYLENOL    100 tablet    Take 2 tablets (650 mg) by mouth every 4 hours as needed for pain    RA (rheumatoid arthritis) (H)       ALOE VERA JUICE Liqd      2 oz twice daily    RA (rheumatoid arthritis) (H)       Alpha-Lipoic Acid 50 MG Caps      100-200 mg daily    RA (rheumatoid arthritis) (H)       ascorbic acid 500 MG tablet    VITAMIN C    30 tablet    Take 3 tablets (1,500 mg) by mouth daily    RA (rheumatoid arthritis) (H)       calcium-magnesium 500-250 MG Tabs per tablet    CALMAG     Take 1 tablet by mouth daily    RA (rheumatoid arthritis) (H)       diazepam 5 MG tablet    VALIUM    60 tablet    Take 1 tablet (5 mg) by mouth every 12 hours as needed for anxiety or sleep    Rheumatoid arthritis involving multiple sites, unspecified rheumatoid factor presence (H)       HYDROcodone-acetaminophen 5-325 MG per tablet    NORCO    240 tablet    Take 1-2 tablets by mouth every 6 hours as needed for moderate to severe pain or pain    Rash and nonspecific skin eruption       ibuprofen 600 MG tablet    ADVIL/MOTRIN    90 tablet    Take 1 tablet (600 mg) by mouth every 6 hours as needed for pain    RA (rheumatoid arthritis) (H)       Krill Oil 1000 MG Caps      Taking 2 daily.        niacin 500 MG tablet      Take 1 tablet (500 mg) by mouth daily (with breakfast) Takes 2000 mg daily    RA (rheumatoid  arthritis) (H)       * OVER-THE-COUNTER      Tumeric        * OVER-THE-COUNTER      Megastress B with vitamin C 2 tablets daily    RA (rheumatoid arthritis) (H)       * OVER-THE-COUNTER      Powdered barely grass 1 tablespoon daily    RA (rheumatoid arthritis) (H)       * OVER-THE-COUNTER      Bio-Flex.    RA (rheumatoid arthritis) (H)       * OVER-THE-COUNTER      Ashwagandha 400 mg daily    RA (rheumatoid arthritis) (H)       * predniSONE 1 MG tablet    DELTASONE    120 tablet    Taper as directed from 12.5 mg    Rheumatoid arthritis of multiple sites without rheumatoid factor (H)       * predniSONE 5 MG tablet    DELTASONE    120 tablet    Take 2 tablets (10 mg) by mouth daily Patient is tapering from 11 mg daily--currently will need to take 2 5 mg tablets daily    Rheumatoid arthritis involving multiple sites, unspecified rheumatoid factor presence (H)       vitamin D 2000 UNITS tablet     100 tablet    Take 2,000 Units by mouth Twice weekly    RA (rheumatoid arthritis) (H)       * Notice:  This list has 7 medication(s) that are the same as other medications prescribed for you. Read the directions carefully, and ask your doctor or other care provider to review them with you.

## 2017-07-05 NOTE — PROGRESS NOTES
"Infusion Nursing Note:  Leanne Zuñiga presents today for IV Orencia.    Patient seen by provider today: No   present during visit today: Not Applicable.    Note: IV Orencia infused over 30 minutes via a peripheral IV. Pt. Developed a hive on the back of her neck about the size of a nickel. Stated that it was itchy. It was raised. She would not take an oral benadryl because she stated that she has 30 minutes to drive. She stated that the benadryl effects her and she will never be able to drive home. Observed her for 30 minutes after the infusion to see if she had any more issues. She had an itchy area also on her stomach on the left side. No problems with her throat itching or feeling like it was closing. Vital signs stable.I sent her home with an oral 25 mg benadryl to take if she has any increased symptoms. Pt. Instructed to do so. Pt. Will call Dr. Raines tomorrow to let him know what happened. Pt. Will probably get her future infusions closer to home so she can take the premeds. 15 minutes face to face time to observe the patients skin reaction.  Intravenous Access:  No Intravenous access/labs at this visit.  Peripheral IV placed.    Treatment Conditions:  Rheumatology Infusion Checklist: PRIOR TO INFUSION OF BIOLOGICAL MEDICATIONS OR ANY OF THESE AS LISTED: Orencia (abatacept) \".rheumbiologicalchecklist\"    Prior to Infusion of biological medications or any of these as listed:    1. Elevated temperature, fever, chills, productive cough or abnormal vital signs, night sweats, coughing up blood or sputum, no appetite or abnormal vital signs : NO    2. Open wounds or new incisions: NO    3. Recent hospitalization: NO    4.  Recent surgeries:  NO    5. Any upcoming surgeries or dental procedures?:NO    6. Any current or recent bouts of illness or infection? On any antibiotics? : NO    7. Any new, sudden or worsening abdominal pain :NO    8. Vaccination within 4 weeks? Patient or someone in the " household is scheduled to receive vaccination? No live virus vaccines prior to or during treatment :NO    9. Any nervous system diseases [i.e. multiple sclerosis, Guillain-Enid, seizures, neurological  changes]: NO    10. Pregnant or breast feeding; or plans on pregnancy in the future: NO    11. Signs of worsening depression or suicidal ideations while taking benlysta:NO    12. New-onset medical symptoms: NO    13.  New cancer diagnosis or on chemotherapy or radiation NO    14.  Evaluate for any sign of active TB [Unexplained weight loss, Loss of appetite, Night sweats, Fever, Fatigue, Chills, Coughing for 3 weeks or longer, Hemoptysis (coughing up blood), Chest pain]: NO    **Note: If answered yes to any of the above, hold the infusion and contact ordering rheumatologist or on-call rheumatologist.   .      Post Infusion Assessment:  Blood return noted pre and post infusion.  Site patent and intact, free from redness, edema or discomfort.  No evidence of extravasations.  Access discontinued per protocol.  Rheumatology Post Infusion: POST-INFUSION OF BIOLOGICAL MEDICATION:    Reviewed with patient.  Given biologic medication or medication hand-out. Inform patient if any fever, chills or signs of infection, new symptoms, abdominal pain, heart palpitations, shortness of breath, reaction, weakness, neurological changes, seek medical attention immediately and should not receive infusions. No live virus vaccines prior to or during treatment or up to 6 months post infusion. If the patient has an upcoming procedure or surgery, this should be discussed with the rheumatologist and surgeon or provider.    Discharge Plan:   Patient and/or family verbalized understanding of discharge instructions and all questions answered.  Patient discharged in stable condition accompanied by: self.  Departure Mode: Ambulatory.  Face to Face time: 15 minutes.    Elsa Stubbs RN

## 2017-07-06 NOTE — PROGRESS NOTES
Ms Zuñiga is seen back today to review her MRI which showed extensive chondromalacia in her knee as well as some synovitis and a moderate effusion.  I suggested that we try to aspirate and inject the knee today to see if this would provide her any relief.  Extensive time today was taken to review her MRI images which shows tricompartmental osteoarthritis with fairly uniform grade 3 to grade 4 articular cartilage loss of the anteromedial condyles, intermedial condyles, and posterior tibial plateau.  There is also marginal degenerative tearing of the lateral greater than medial meniscus with some mucoid degeneration.    PHYSICAL EXAM:    VITALS:  BP:  109/60   Pulse: 60  Musculoskeletal:  There is a small effusion of the left knee with synovitis.    Impression:    1.  Rheumatoid Arthritis  2.  Osteoarthritis of the left knee a component of rheumatoid arthritis    Recommendation:    1. We will aspirate and inject the knee today.  2. Caution her that with the extensive loss of cartilage especially behind the patella and in the trochlear groove that she will likely need a knee replacement.  Hopefully we can give her some relief today.    Procedure Note:    After informed verbal consent was obtained, the knee was prepped using sterile technique.  After using ethyl chloride for anesthetic, the knee was injected with 40 mg of Kenalog with 2 cc of lidocaine into the left knee.  An initial aspirate of 20 cc of serosanguinous fluid was removed.  The patient tolerated this procedure well.

## 2017-07-12 RX ORDER — TRIAMCINOLONE ACETONIDE 40 MG/ML
40 INJECTION, SUSPENSION INTRA-ARTICULAR; INTRAMUSCULAR ONCE
Qty: 1 ML | Refills: 0 | OUTPATIENT
Start: 2017-07-12 | End: 2017-07-12

## 2017-07-12 RX ORDER — LIDOCAINE HYDROCHLORIDE 10 MG/ML
2 INJECTION, SOLUTION INFILTRATION; PERINEURAL ONCE
Qty: 2 ML | Refills: 0 | OUTPATIENT
Start: 2017-07-12 | End: 2017-07-12

## 2017-07-17 ENCOUNTER — TELEPHONE (OUTPATIENT)
Dept: RHEUMATOLOGY | Facility: CLINIC | Age: 67
End: 2017-07-17

## 2017-07-17 NOTE — TELEPHONE ENCOUNTER
[facility]      Please return call to her at [phone]

## 2017-07-17 NOTE — TELEPHONE ENCOUNTER
Pt has been on Orencia since 2015. Please advise on note below regarding continuing Orencia.  Delia FABIAN RN BSN PHN  Specialty Clinics      Infusion note from 7/5/17  Note: IV Orencia infused over 30 minutes via a peripheral IV. Pt. Developed a hive on the back of her neck about the size of a nickel. Stated that it was itchy. It was raised. She would not take an oral benadryl because she stated that she has 30 minutes to drive. She stated that the benadryl effects her and she will never be able to drive home. Observed her for 30 minutes after the infusion to see if she had any more issues. She had an itchy area also on her stomach on the left side. No problems with her throat itching or feeling like it was closing. Vital signs stable.I sent her home with an oral 25 mg benadryl to take if she has any increased symptoms. Pt. Instructed to do so. Pt. Will call Dr. Raines tomorrow to let him know what happened. Pt. Will probably get her future infusions closer to home so she can take the premeds. 15 minutes face to face time to observe the patients skin reaction.

## 2017-07-17 NOTE — TELEPHONE ENCOUNTER
Called returned to [facility] infusion center to clarify orders.  Maine Lerner LPN

## 2017-07-17 NOTE — TELEPHONE ENCOUNTER
Reason for Call:  Other reaction    Detailed comments: Pt states she broke out in hives when she took her orencia on 07/05, please call to discuss if she should continue med    Phone Number Patient can be reached at: Home number on file [phone] (home)    Best Time: today    Can we leave a detailed message on this number? YES    Call taken on 7/17/2017 at 8:18 AM by Alyssa Lakhani

## 2017-07-17 NOTE — TELEPHONE ENCOUNTER
Called and spoke with pt regarding note below.   Pt would like to have further infusions at [place] [facility] so that she is able to take Benadryl prior to her infusions and is closer to home.     Please review and advise. Thank you.  Franca WHITE RN  Specialty Flex

## 2017-08-03 ENCOUNTER — TELEPHONE (OUTPATIENT)
Dept: RHEUMATOLOGY | Facility: CLINIC | Age: 67
End: 2017-08-03

## 2017-08-03 NOTE — TELEPHONE ENCOUNTER
Reason for Call:  Other call back    Detailed comments: pt calling stating she started orencia at 500 mg and is now at 1000mg but broke out in hives and took benadryl. Would like to if she can drop down to 750 mg instead to see if that helps w/ the hives breakout. She states [facility] offers orencia in 250 mg increments     Phone Number Patient can be reached at: Home number on file [phone] (home)    Best Time: any     Can we leave a detailed message on this number? YES    Call taken on 8/3/2017 at 11:07 AM by Aparna Fernández

## 2017-08-03 NOTE — TELEPHONE ENCOUNTER
Pt wants either orencia 1000 mg with solumedrol or reduce orencia dose to 750 mg and keep benadryl.  She does not want to do solumedrol and benadryl, one or the other.  Delia FABIAN RN BSN PHN  Specialty Clinics

## 2017-08-03 NOTE — TELEPHONE ENCOUNTER
Spoke to pt and she reported that she had a couple spots after the infusion and she did take the benadryl. She stated that she never had a full rash. She is wondering if the solumedrol would replace the benadryl as the benadryl makes her so tired. She would prefer do do the orencia infusion and the solumedrol and stop the benadryl to stay at current dose.  If pt still has to do the benadryl then she would like to reduce the dose to the 750 mg.  Please advise.  Delia FABIAN RN BSN PHN  Specialty Clinics

## 2017-08-09 ENCOUNTER — OFFICE VISIT (OUTPATIENT)
Dept: RHEUMATOLOGY | Facility: CLINIC | Age: 67
End: 2017-08-09
Payer: MEDICARE

## 2017-08-09 VITALS
BODY MASS INDEX: 23.17 KG/M2 | DIASTOLIC BLOOD PRESSURE: 75 MMHG | WEIGHT: 152.4 LBS | HEART RATE: 60 BPM | SYSTOLIC BLOOD PRESSURE: 130 MMHG | RESPIRATION RATE: 16 BRPM

## 2017-08-09 DIAGNOSIS — M06.9 RHEUMATOID ARTHRITIS OF HAND, UNSPECIFIED LATERALITY, UNSPECIFIED RHEUMATOID FACTOR PRESENCE: Primary | ICD-10-CM

## 2017-08-09 PROCEDURE — 99214 OFFICE O/P EST MOD 30 MIN: CPT | Performed by: INTERNAL MEDICINE

## 2017-08-09 NOTE — PROGRESS NOTES
HISTORY OF PRESENT ILLNESS:  Shaheed Zuñiga is seen back in followup for her rheumatoid arthritis.  She has had a reaction to the Orencia where she gets a bit of a rash.  She would like to decrease the dose and we had talked about giving her dose of Solu-Medrol with the infusion to see if this will mitigate this.  She thinks that taking a lower dose than 1000 mg will make a difference.  I am not sure that it will but I am not opposed to giving it a try.      She also has her MRI with her for me to review, it does in fact show significant loss of cartilage in the knee as well as bone edema and see an effusion; however, overall she does think she is better on the Orencia, is down on a mostly stable dose of 5 mg a day of prednisone.      PHYSICAL EXAMINATION:     VITALS:  Blood pressure 130/75, pulse 60.     MUSCULOSKELETAL:  There is tenosynovitis involving the right wrist.  There is still synovitis of the left wrist and left ankle.  There is also still small effusion and synovitis of the left knee.   SKIN:  Without lesions.      IMPRESSION:   1.  Seropositive rheumatoid arthritis.   2.  Infusion reaction.        RECOMMENDATIONS:     1.  She may want to try some Claritin and further Benadryl.   2.  We will not give her Benadryl because it could make her sleepy and she has to drive however I will give her 40 mg of Solu-Medrol with the Orencia.  If she still has a reaction after that then we are going to have to unfortunately go on to something else.  I would hate to have to do that because it does appear that the Orencia is clearly working, I will also decrease the dose to 750 mg daily.   3.  Maintain Prednisone 5 mg daily.   4.  Follow up with me in 2 months.         KARIME VINCENT MD             D: 2017 13:41   T: 2017 13:50   MT: [id number]      Name:     SHAHEED ZUÑIGA   MRN:      [id number]        Account:      [id number]   :      1950           Visit Date:   2017      Document:  [id number]

## 2017-08-09 NOTE — NURSING NOTE
"Chief Complaint   Patient presents with     RECHECK     RA- getting hives from [facility]       Initial There were no vitals taken for this visit. Estimated body mass index is 22.81 kg/(m^2) as calculated from the following:    Height as of 4/12/17: 5' 8\" (1.727 m).    Weight as of 6/29/17: 150 lb (68 kg).  Medication Reconciliation: complete    "

## 2017-08-30 ENCOUNTER — TELEPHONE (OUTPATIENT)
Dept: RHEUMATOLOGY | Facility: CLINIC | Age: 67
End: 2017-08-30

## 2017-08-30 DIAGNOSIS — M06.9 RHEUMATOID ARTHRITIS INVOLVING MULTIPLE SITES, UNSPECIFIED RHEUMATOID FACTOR PRESENCE: ICD-10-CM

## 2017-08-30 DIAGNOSIS — M06.09 RHEUMATOID ARTHRITIS OF MULTIPLE SITES WITHOUT RHEUMATOID FACTOR (H): ICD-10-CM

## 2017-08-30 RX ORDER — PREDNISONE 5 MG/1
5 TABLET ORAL DAILY
Qty: 30 TABLET | Refills: 1 | Status: SHIPPED | OUTPATIENT
Start: 2017-08-30 | End: 2017-09-08 | Stop reason: DRUGHIGH

## 2017-08-30 RX ORDER — PREDNISONE 1 MG/1
TABLET ORAL
Qty: 120 TABLET | Refills: 2 | Status: CANCELLED | OUTPATIENT
Start: 2017-08-30

## 2017-08-30 NOTE — TELEPHONE ENCOUNTER
Per infusion center.    Patient completed the steroids premed and the full course of reduced dose Orencia.  Shortly after completing the full infusion,  Patient developed another hive on the same spot back of her neck as before,  As well as this time additional single hive on torso.    Advised to begin regular dose benadryl once returns home and is safe from sleeping side effect.  And await further directions about the infusion.    ______________________________discussion with Dr Raines:  Essentially up to the patient.  If does not develop any severe reaction, and determines if any benefit from this reduced dose;     Could consider increased premed steroid dose, and / or  add Benadryl,  Or can stop altogether and consider something else with patient understanding that with every future infusion could most likely continue to develop a more serious sensitivity every time.   Can be risk of anaphylaxis.    Will need to advise patient of message, and will also need to ensure infusion center gets any updated orders once patient decides in time for next infusion in 4 weeks.    Jaki Yin RN  [facility]

## 2017-08-30 NOTE — TELEPHONE ENCOUNTER
Per recent office visit, patient is on stable 5mg daily of steroid.    Will send refill for 5 mg tabs to provider.  Will cancel the request for the 1mg tabs.     Jaki Yin RN  [facility]

## 2017-08-30 NOTE — TELEPHONE ENCOUNTER
Patient at [facility] ([facility])  And needs the orders faxed ASAP for the Steroid pre-med   (with or without tylenol?)     And the reduced dose order for the Orencia.    Please fax ASAP to [phone]    Jaki Yin RN  [facility]

## 2017-09-05 NOTE — TELEPHONE ENCOUNTER
Left message on answering machine for patient to call back.  Jaki Yin RN  [facility]

## 2017-09-07 NOTE — TELEPHONE ENCOUNTER
Attempted to call home number x 2 and line was completely dead.    Nathalia Vazquez  [facility] RN

## 2017-09-08 RX ORDER — PREDNISONE 1 MG/1
4 TABLET ORAL DAILY
Qty: 120 TABLET | Refills: 0 | Status: SHIPPED | OUTPATIENT
Start: 2017-09-08 | End: 2017-10-17

## 2017-09-08 NOTE — TELEPHONE ENCOUNTER
"Will cancel the 5mg as that was error per patient made by the pharm.  She only needs the 1mg tabs for 4mg daily dose.  Rx sent as order correction.    Discussed with patient about options for Orencia and premed.    Sandra says the response so far to the lower dose Orencia is satisfactorily effective.  (will monitor to see if it last the entire time at that dose.)  Understands the risk for worsening reaction or even sudden anaphylaxis with any continued exposure / further doses of Orencia but wishes to Continue.    Still refuses Benadryl and reports insomnia the first night after 40mg steroid IV Premed. Asks for slightly higher steroid premed   (60mg?)  and to infuse the same dose Orencia.on 27th  If ok for this plan:    Please ensure Orders are completed and that someone FAXES these to [facility] that she gets them at.  Sandra clearly states, \"does not want to wait 3 hours for infusion again\".    Jaki Yin RN  [facility]  "

## 2017-09-08 NOTE — TELEPHONE ENCOUNTER
"Left message on answering machine for patient to call back.    Last office note indicated patient on 5mg of steroid a day.   After attempts to call and clarify \"current\" dose to ensure we sent the needed dose and unable to reach.  Resent for the 5mg Tabs as we had no way of knowing she had reduced past 5mg. (and because the 5mg tabs were still being requested)   The 1 mg request was cancelled.      We can cancel the unneeded 5mg tabs and have the 1 mg tabs sent.    Jaki Yin RN  [facility]      "

## 2017-09-08 NOTE — TELEPHONE ENCOUNTER
"Pt called back regarding note below. Informed of Dr Raines's message.    Pt states she is upset because Daniela did not have new orders for change in dosage along with steroid orders for pre infusion, \"40 mg\". And resulted in her waiting at infusion center for over 3 hrs.   Pt is not and \"will not\" take Benadryl. \"I have only had 2 hives.\" Re informed pt that sensitivity can increase into a more serious reaction. Pt continues to state \"I will not\".  Pt is fine staying on the 750 mg of Orencia with the steroid premedication.     Pt is currently taking 4 mg of prednisone daily. Pt states she has been trying to get a refill of her 1 mg Prednisone and Russ Bee has been sending faxed requests daily with no response. Writer is unable to locate any requests.     Pt states she was having Internet issues at her home yesterday and is able to receive calls today. Pt is adament that she get her prednisone today. Informed that we would sent the request in to Dr Raines.     Franca S RN  [facility]      "

## 2017-09-27 ENCOUNTER — TELEPHONE (OUTPATIENT)
Dept: RHEUMATOLOGY | Facility: CLINIC | Age: 67
End: 2017-09-27

## 2017-09-27 NOTE — TELEPHONE ENCOUNTER
Reason for Call:  Other call back    Detailed comments: [facility] Calling - Emmie.  Pt is coming into [facility] today in a couple of hours.  Pt is telling them that her pre-meds aren't right.  Need to clarify orders.    Phone Number Patient can be reached at: Other phone number:  [phone]    Best Time: soon please    Can we leave a detailed message on this number? YES    Call taken on 9/27/2017 at 9:14 AM by Asuncion Garcia

## 2017-09-27 NOTE — TELEPHONE ENCOUNTER
That is already how the order is written.  Could someone check these things before sending these messages to me?

## 2017-09-27 NOTE — TELEPHONE ENCOUNTER
Pt calling stating she is needing orders to be sent in to up the steroid fro 40 mg to 60 mg. Please seen 8/30 enc.   Please fax orders ASAP to [phone] pt has appt today in a couple hours.       Aparna Fernández  Specialty CSS

## 2017-09-28 ENCOUNTER — TELEPHONE (OUTPATIENT)
Dept: RHEUMATOLOGY | Facility: CLINIC | Age: 67
End: 2017-09-28

## 2017-09-28 NOTE — TELEPHONE ENCOUNTER
Reason for Call:  Other     Detailed comments: Pt had Orencia infusion yesterday - Pt had 1 hive break out (less than last time). Pt states she was doing better when doing injections herself at home - But Medicare is making her have the increased dose. States when she was on 125 a week she was fine. States she now has to be on Prednisone also, which is making her agitated and hard to sleep. She would like to know if this can be changed back to what she was on originally - Please advise    Phone Number Patient can be reached at: Home number on file [phone] (home)    Best Time: within 1/2 hour or this afternoon    Can we leave a detailed message on this number? YES    Call taken on 9/28/2017 at 9:55 AM by Denise Behrendt

## 2017-09-28 NOTE — TELEPHONE ENCOUNTER
Please see note below and advise.  Can this be appealed through medicare?  Delia FABIAN RN BSN PHN  [facility]

## 2017-10-05 ENCOUNTER — TELEPHONE (OUTPATIENT)
Dept: RHEUMATOLOGY | Facility: CLINIC | Age: 67
End: 2017-10-05

## 2017-10-05 DIAGNOSIS — M06.9 RHEUMATOID ARTHRITIS OF HAND, UNSPECIFIED LATERALITY, UNSPECIFIED RHEUMATOID FACTOR PRESENCE: ICD-10-CM

## 2017-10-11 ENCOUNTER — OFFICE VISIT (OUTPATIENT)
Dept: RHEUMATOLOGY | Facility: CLINIC | Age: 67
End: 2017-10-11
Payer: MEDICARE

## 2017-10-11 VITALS
HEART RATE: 67 BPM | BODY MASS INDEX: 22.66 KG/M2 | WEIGHT: 149 LBS | RESPIRATION RATE: 12 BRPM | SYSTOLIC BLOOD PRESSURE: 119 MMHG | DIASTOLIC BLOOD PRESSURE: 74 MMHG

## 2017-10-11 DIAGNOSIS — M06.9 RHEUMATOID ARTHRITIS INVOLVING MULTIPLE SITES, UNSPECIFIED RHEUMATOID FACTOR PRESENCE: Primary | ICD-10-CM

## 2017-10-11 DIAGNOSIS — M06.9 RHEUMATOID ARTHRITIS OF HAND, UNSPECIFIED LATERALITY, UNSPECIFIED RHEUMATOID FACTOR PRESENCE: ICD-10-CM

## 2017-10-11 PROCEDURE — 99214 OFFICE O/P EST MOD 30 MIN: CPT | Performed by: INTERNAL MEDICINE

## 2017-10-11 NOTE — NURSING NOTE
"Chief Complaint   Patient presents with     RECHECK     RA 2month follow-up       Initial /74 (BP Location: Left arm, Patient Position: Chair, Cuff Size: Adult Regular)  Pulse 67  Resp 12  Wt 67.6 kg (149 lb)  BMI 22.66 kg/m2 Estimated body mass index is 22.66 kg/(m^2) as calculated from the following:    Height as of 4/12/17: 1.727 m (5' 8\").    Weight as of this encounter: 67.6 kg (149 lb).  Medication Reconciliation: complete     Shira Laboy MA      "

## 2017-10-12 NOTE — PROGRESS NOTES
SUBJECTIVE:  Ms. Shaheed Zuñiga is seen back in followup for her rheumatoid arthritis.  She is again wondering about switching back to injectable Orencia.  Discussed with her that unfortunately the problem is that the injectable Orencia is going to be covered under Medicare part D whereas intravenous Orencia is covered under Medicare part B so while she may be approved for getting injectable Orencia, coverage is still going to be an issue.  She did get 1 lesion that caused an outbreak after her Orencia infusion on the intravenous Solu-Medrol, however this did also lead to her difficulty sleeping and being agitated for the next several days.  She would like to know if we can cut back on Solu-Medrol dosing to 50.  She says this needs to be sent to [facility].      Otherwise, she has also started noticing some patching in her left elbow.  Discussed that this may be some hypertrophied synovium causing this.  She thinks that her knee is doing well and actually her ankles are fairly well on current regimen.  She has been able to get her prednisone all the way down to 2 mg a day.      PHYSICAL EXAMINATION:  Blood pressure is 119/74, weight 149.  There is a little bit of synovitis of the left elbow.  There is no synovitis of the wrists, MCPs, or PIPs nor of the elbows, shoulders, knees or ankles.  Skin is without lesions.      IMPRESSION:  Seronegative rheumatoid arthritis.      RECOMMENDATIONS:   1.  Continue Orencia.  Will give only 50 mg Solu-Medrol as a premed.   2.  Continue to taper off prednisone as she is able.   3.  Follow up with me in 2 months.         KARIME VINCENT MD             D: 10/11/2017 12:48   T: 10/12/2017 06:01   MT: [id number]      Name:     SHAHEED ZUÑIGA   MRN:      [id number]        Account:      [id number]   :      1950           Visit Date:   10/11/2017      Document: [id number]

## 2017-10-16 ENCOUNTER — TELEPHONE (OUTPATIENT)
Dept: RHEUMATOLOGY | Facility: CLINIC | Age: 67
End: 2017-10-16

## 2017-10-16 NOTE — TELEPHONE ENCOUNTER
Called patient to verify which other pharmacy she would like her Abatacept injection sent to as [facility] sent a fax back stating that patients insurance does not cover home delivery. She stated that it needs to be a specialty pharmacy in order for her insurance to cover.   Patient also requested a refill of her prednisone 1 mg.    Thank you,  Ladarius Ellison [facility]

## 2017-10-17 DIAGNOSIS — M06.09 RHEUMATOID ARTHRITIS OF MULTIPLE SITES WITHOUT RHEUMATOID FACTOR (H): ICD-10-CM

## 2017-10-19 RX ORDER — PREDNISONE 1 MG/1
4 TABLET ORAL DAILY
Qty: 120 TABLET | Refills: 0 | Status: SHIPPED | OUTPATIENT
Start: 2017-10-19 | End: 2017-12-14

## 2017-10-19 NOTE — TELEPHONE ENCOUNTER
Please refill.  Delia FABIAN RN BSN PHN  Special[facility] Clinics    LOV 10/11/17  RECOMMENDATIONS:   1.  Continue Orencia.  Will give only 50 mg Solu-Medrol as a premed.   2.  Continue to taper off prednisone as she is able.   3.  Follow up with me in 2 months.

## 2017-10-25 ENCOUNTER — TELEPHONE (OUTPATIENT)
Dept: RHEUMATOLOGY | Facility: CLINIC | Age: 67
End: 2017-10-25

## 2017-10-25 DIAGNOSIS — M06.9 RHEUMATOID ARTHRITIS OF HAND, UNSPECIFIED LATERALITY, UNSPECIFIED RHEUMATOID FACTOR PRESENCE: ICD-10-CM

## 2017-10-25 NOTE — TELEPHONE ENCOUNTER
"Spoke to Elodia from [facility] she wanted to know if it was \"OK\" to give patient name brand since only Orencia is available.  Maine Lerner LPN      "

## 2017-10-26 NOTE — TELEPHONE ENCOUNTER
Dr Raines;  Please verify that you indeed want patient switched back to injectable Orencia as your last office note discussed it but your final order of it states= Continue Orencia with IV Premed.   if so-  Verify that you are ok with prescribing injectable despite it being on her Allergy list  (Allergy list does not differentiate the injectable vs IV)    Jaki Yin RN  [facility]

## 2017-10-26 NOTE — TELEPHONE ENCOUNTER
Pt has stated it is covered if obtained thru a specialty Pharmacy.  Pharmacy calling if ok to dispense it depite listed allergy.  Jaki Yin RN  [facility]

## 2017-10-26 NOTE — TELEPHONE ENCOUNTER
refaxed rx with attached info regarding allergy listed as only IV not inj form.  Jaki Yin RN  [facility]

## 2017-10-26 NOTE — TELEPHONE ENCOUNTER
Rody from [facility] calling to find out if they are ok to dispense Orencia as it is listed as an allergy.     Please call:  [phone] (option 1).     Denise Behrendt  [facility] CSS

## 2017-11-17 ENCOUNTER — TELEPHONE (OUTPATIENT)
Dept: RHEUMATOLOGY | Facility: CLINIC | Age: 67
End: 2017-11-17

## 2017-11-17 NOTE — TELEPHONE ENCOUNTER
Pt called stating that she is unable to get in to see her new Rheumatologist until end of February. Pt would like to know if Dr. Raines would refill her RX for Orencia and Prednisone for January and February? Please advise.    Alanna Saltillo  [facility] [place]

## 2017-11-17 NOTE — TELEPHONE ENCOUNTER
Can discuss at the upcoming.  Advised most likely not going to be any need for special orders except provide new contact info in case of reaction.  She already gets at outside infusion office and they already give outside providers orders.  Jaki Yin RN  [facility]

## 2017-11-20 ENCOUNTER — TELEPHONE (OUTPATIENT)
Dept: RHEUMATOLOGY | Facility: CLINIC | Age: 67
End: 2017-11-20

## 2017-11-20 NOTE — TELEPHONE ENCOUNTER
Reason for Call:  Other     Detailed comments: Pt calling stating she needs to up her dose of Orencia to 1000 tomorrow for her infusion at [facility]. Having trouble making it through the night.      Phone Number Patient can be reached at: Home number on file [phone] (home)    Best Time: Any    Can we leave a detailed message on this number? YES    Call taken on 11/20/2017 at 2:36 PM by Denise Behrendt

## 2017-11-20 NOTE — TELEPHONE ENCOUNTER
Called pt left message to return call.  Need fax number that we can fax this order to on Orencia that Dr. Raines changed infusion dose as requested by pt.    Nathalia Vazquez  [facility] RN

## 2017-11-21 NOTE — TELEPHONE ENCOUNTER
Mercedes alvarado from [facility] at [phone] to clarify order received via fax today to increase Orencia from 750mg to 1000mg today.    Nathalia Vazquez  [facility] RN

## 2017-11-21 NOTE — TELEPHONE ENCOUNTER
Pt called stating please fax order over to [facility] at [phone]  Fax was already received     Aparna [facility] CSS

## 2017-12-14 ENCOUNTER — OFFICE VISIT (OUTPATIENT)
Dept: RHEUMATOLOGY | Facility: CLINIC | Age: 67
End: 2017-12-14
Payer: MEDICARE

## 2017-12-14 VITALS
HEART RATE: 71 BPM | DIASTOLIC BLOOD PRESSURE: 72 MMHG | SYSTOLIC BLOOD PRESSURE: 130 MMHG | BODY MASS INDEX: 22.2 KG/M2 | WEIGHT: 146 LBS

## 2017-12-14 DIAGNOSIS — M06.9 RHEUMATOID ARTHRITIS INVOLVING MULTIPLE SITES, UNSPECIFIED RHEUMATOID FACTOR PRESENCE: Primary | ICD-10-CM

## 2017-12-14 DIAGNOSIS — R21 RASH AND NONSPECIFIC SKIN ERUPTION: ICD-10-CM

## 2017-12-14 DIAGNOSIS — M06.09 RHEUMATOID ARTHRITIS OF MULTIPLE SITES WITHOUT RHEUMATOID FACTOR (H): ICD-10-CM

## 2017-12-14 PROCEDURE — 99214 OFFICE O/P EST MOD 30 MIN: CPT | Performed by: INTERNAL MEDICINE

## 2017-12-14 RX ORDER — HYDROCODONE BITARTRATE AND ACETAMINOPHEN 5; 325 MG/1; MG/1
1-2 TABLET ORAL EVERY 6 HOURS PRN
Qty: 240 TABLET | Refills: 0 | Status: SHIPPED | OUTPATIENT
Start: 2017-12-14 | End: 2020-07-09

## 2017-12-14 RX ORDER — PREDNISONE 1 MG/1
4 TABLET ORAL DAILY
Qty: 120 TABLET | Refills: 5 | Status: SHIPPED | OUTPATIENT
Start: 2017-12-14

## 2017-12-14 NOTE — NURSING NOTE
"Chief Complaint   Patient presents with     RECHECK       Initial /72  Pulse 71  Wt 146 lb (66.2 kg)  BMI 22.2 kg/m2 Estimated body mass index is 22.2 kg/(m^2) as calculated from the following:    Height as of 4/12/17: 5' 8\" (1.727 m).    Weight as of this encounter: 146 lb (66.2 kg).      Patient prefers to be contacted via at Home.   Okay to leave detailed message: Yes  Patient uses MyChart: Lisa RUSSELL LPN    "

## 2017-12-14 NOTE — MR AVS SNAPSHOT
"              After Visit Summary   12/14/2017    Leanne Zuñiga    MRN: [id number]           Patient Information     Date Of Birth          1950        Visit Information        Provider Department      12/14/2017 11:00 AM Nate Raines MD [facility]        Today's Diagnoses     Rheumatoid arthritis involving multiple sites, unspecified rheumatoid factor presence (H)    -  1    Rheumatoid arthritis of multiple sites without rheumatoid factor (H)        Rash and nonspecific skin eruption           Follow-ups after your visit        Your next 10 appointments already scheduled     Feb 26, 2018  1:20 PM CST   [facility]    [address] [facility] [address] [phone]              Who to contact     If you have questions or need follow up information about today's clinic visit or your schedule please contact [facility] directly at [phone].  Normal or non-critical lab and imaging results will be communicated to you by MyChart, letter or phone within 4 business days after the clinic has received the results. If you do not hear from us within 7 days, please contact the clinic through [employer] or phone. If you have a critical or abnormal lab result, we will notify you by phone as soon as possible.  Submit refill requests through [employer] or call your pharmacy and they will forward the refill request to us. Please allow 3 business days for your refill to be completed.          Additional Information About Your Visit        [employer] Information     [employer] lets you send messages to your doctor, view your test results, renew your prescriptions, schedule appointments and more. To sign up, go to [email or web address] . Click on \"Log in\" on the left side of the screen, which will take you to the Welcome page. Then click on \"Sign up Now\" on the right side of the page.     You will be asked " to enter the access code listed below, as well as some personal information. Please follow the directions to create your username and password.     Your access code is: [email or web address]  Expires: 3/14/2018  3:53 PM     Your access code will  in 90 days. If you need help or a new code, please call your [place] clinic or [phone].        Care EveryWhere ID     This is your Care EveryWhere ID. This could be used by other organizations to access your [place] medical records  [id number]        Your Vitals Were     Pulse BMI (Body Mass Index)                71 22.2 kg/m2           Blood Pressure from Last 3 Encounters:   17 130/72   10/11/17 119/74   17 130/75    Weight from Last 3 Encounters:   17 146 lb (66.2 kg)   10/11/17 149 lb (67.6 kg)   17 152 lb 6.4 oz (69.1 kg)              Today, you had the following     No orders found for display         Where to get your medicines      These medications were sent to [facility] [address]  [address] [address]     Phone:  [phone]     predniSONE 1 MG tablet         Some of these will need a paper prescription and others can be bought over the counter.  Ask your nurse if you have questions.     Bring a paper prescription for each of these medications     HYDROcodone-acetaminophen 5-325 MG per tablet          Primary Care Provider Office Phone # Fax #    Cleo ADAMS Carl [phone]       [facility] [address] [address]        Equal Access to Services     [facility] CenterENRRIQUE : Hadii aad kim hadasho Solinali, waaxda luqadaha, qaybta kaalmada adriana, lissy mckeon. So [facility] [phone].    ATENCIÓN: Si habla español, tiene a arias disposición servicios gratuitos de asistencia lingüística. Llame al [phone].    We comply with applicable federal civil rights laws and [place] laws. We do not discriminate on the basis of  race, color, national origin, age, disability, sex, sexual orientation, or gender identity.            Thank you!     Thank you for choosing [facility]  for your care. Our goal is always to provide you with excellent care. Hearing back from our patients is one way we can continue to improve our services. Please take a few minutes to complete the written survey that you may receive in the mail after your visit with us. Thank you!             Your Updated Medication List - Protect others around you: Learn how to safely use, store and throw away your medicines at www.disposemymeds.org.          This list is accurate as of: 12/14/17  3:53 PM.  Always use your most recent med list.                   Brand Name Dispense Instructions for use Diagnosis    acetaminophen 325 MG tablet    TYLENOL    100 tablet    Take 2 tablets (650 mg) by mouth every 4 hours as needed for pain    RA (rheumatoid arthritis) (H)       ALOE VERA JUICE Liqd      2 oz twice daily    RA (rheumatoid arthritis) (H)       Alpha-Lipoic Acid 50 MG Caps      100-200 mg daily    RA (rheumatoid arthritis) (H)       ascorbic acid 500 MG tablet    VITAMIN C    30 tablet    Take 3 tablets (1,500 mg) by mouth daily    RA (rheumatoid arthritis) (H)       calcium-magnesium 500-250 MG Tabs per tablet    CALMAG     Take 1 tablet by mouth daily    RA (rheumatoid arthritis) (H)       diazepam 5 MG tablet    VALIUM    60 tablet    Take 1 tablet (5 mg) by mouth every 12 hours as needed for anxiety or sleep    Rheumatoid arthritis involving multiple sites, unspecified rheumatoid factor presence (H)       HYDROcodone-acetaminophen 5-325 MG per tablet    NORCO    240 tablet    Take 1-2 tablets by mouth every 6 hours as needed for moderate to severe pain or pain    Rash and nonspecific skin eruption       ibuprofen 600 MG tablet    ADVIL/MOTRIN    90 tablet    Take 1 tablet (600 mg) by mouth every 6 hours as needed for pain    RA (rheumatoid arthritis) (H)        Krill Oil 1000 MG Caps      Taking 2 daily.        niacin 500 MG tablet      Take 1 tablet (500 mg) by mouth daily (with breakfast) Takes 2000 mg daily    RA (rheumatoid arthritis) (H)       * OVER-THE-COUNTER      Tumeric        * OVER-THE-COUNTER      Megastress B with vitamin C 2 tablets daily    RA (rheumatoid arthritis) (H)       * OVER-THE-COUNTER      Powdered barely grass 1 tablespoon daily    RA (rheumatoid arthritis) (H)       * OVER-THE-COUNTER      Bio-Flex.    RA (rheumatoid arthritis) (H)       * OVER-THE-COUNTER      Ashwagandha 400 mg daily    RA (rheumatoid arthritis) (H)       predniSONE 1 MG tablet    DELTASONE    120 tablet    Take 4 tablets (4 mg) by mouth daily    Rheumatoid arthritis of multiple sites without rheumatoid factor (H)       vitamin D 2000 UNITS tablet     100 tablet    Take 2,000 Units by mouth Twice weekly    RA (rheumatoid arthritis) (H)       * Notice:  This list has 5 medication(s) that are the same as other medications prescribed for you. Read the directions carefully, and ask your doctor or other care provider to review them with you.

## 2017-12-14 NOTE — PROGRESS NOTES
SUBJECTIVE:  Leanne Zuñiga is seen back in followup for her seropositive rheumatoid arthritis.  She thinks she is doing reasonably well on the Orencia, she has gotten her prednisone down to 3 mg a day but she has noticed increasing fatigue which unfortunately is likely related to rheumatoid arthritis and for which unfortunately there really is not any treatment.  The Orencia infusions she once again got a very small hive just in one area that never did turn into anything else and she did not develop any type of shortness of breath or anaphylaxis.  She thinks the Orencia is working better than anything she has had in the past.  She would like to still continue this.  She is getting  Solu-Medrol at 50 mg.      She still has problems in her knee, both wrists her shoulders and ankles and still was not able to work because of ongoing symptoms including stiffness and fatigue.      PHYSICAL EXAMINATION:   VITAL SIGNS:  Blood pressure 130/72, pulse 71.   LUNGS:  Clear to auscultation bilaterally.   HEART:  Regular rate and rhythm without murmur.   MUSCULOSKELETAL:  Joint exam no synovitis of the wrists, MCPs, or PIPs nor of the elbows, shoulders.  There is mild synovitis with effusion in the left knee.   SKIN:  Without lesions.      IMPRESSION:  Seropositive rheumatoid arthritis.      RECOMMENDATIONS:   1.  Continue Orencia infusion 1000 mg monthly with Tylenol and Solu-Medrol 50 mg.   2.  She could certainly try to taper off the prednisone but unfortunately this may be the only thing that is helping with the fatigue and there is really nothing else to offer for this.  She is going to have to consider the risk of being on low-dose prednisone versus how much worse she feels without it. She certainly can continue tapering off of it if she so desires, but as long as her dose is below 5 mg or less it is overall going to be fairly low risk therapy.   3.  She will need followup in approximately 3-4 months.         KARIME EASON  MD [place]             D: 2017 13:43   T: 2017 15:43   MT: EM#[id number]      Name:     SHAHEED BRIGGS   MRN:      [id number]        Account:      [id number]   :      1950           Visit Date:   2017      Document: [id number]

## 2018-02-22 ENCOUNTER — OFFICE VISIT (OUTPATIENT)
Dept: DERMATOLOGY | Facility: CLINIC | Age: 68
End: 2018-02-22
Payer: MEDICARE

## 2018-02-22 ENCOUNTER — TRANSFERRED RECORDS (OUTPATIENT)
Dept: HEALTH INFORMATION MANAGEMENT | Facility: CLINIC | Age: 68
End: 2018-02-22

## 2018-02-22 VITALS — HEART RATE: 66 BPM | OXYGEN SATURATION: 98 % | SYSTOLIC BLOOD PRESSURE: 125 MMHG | DIASTOLIC BLOOD PRESSURE: 77 MMHG

## 2018-02-22 DIAGNOSIS — D18.00 ANGIOMA: ICD-10-CM

## 2018-02-22 DIAGNOSIS — L81.4 LENTIGO: ICD-10-CM

## 2018-02-22 DIAGNOSIS — D48.5 NEOPLASM OF UNCERTAIN BEHAVIOR OF SKIN: Primary | ICD-10-CM

## 2018-02-22 DIAGNOSIS — Z80.8 FAMILY HISTORY OF MELANOMA: ICD-10-CM

## 2018-02-22 DIAGNOSIS — L82.1 SEBORRHEIC KERATOSIS: ICD-10-CM

## 2018-02-22 DIAGNOSIS — D22.9 NEVUS: ICD-10-CM

## 2018-02-22 PROCEDURE — 88305 TISSUE EXAM BY PATHOLOGIST: CPT | Mod: 26 | Performed by: PHYSICIAN ASSISTANT

## 2018-02-22 PROCEDURE — 88305 TISSUE EXAM BY PATHOLOGIST: CPT | Performed by: PHYSICIAN ASSISTANT

## 2018-02-22 PROCEDURE — 88342 IMHCHEM/IMCYTCHM 1ST ANTB: CPT | Mod: 26 | Performed by: PHYSICIAN ASSISTANT

## 2018-02-22 PROCEDURE — 00000159 ZZHCL STATISTIC H-SEND OUTS PREP: Performed by: PHYSICIAN ASSISTANT

## 2018-02-22 PROCEDURE — 11100 HC BIOPSY SKIN/SUBQ/MUC MEM, SINGLE LESION: CPT | Performed by: PHYSICIAN ASSISTANT

## 2018-02-22 PROCEDURE — 99214 OFFICE O/P EST MOD 30 MIN: CPT | Mod: 25 | Performed by: PHYSICIAN ASSISTANT

## 2018-02-22 PROCEDURE — 88342 IMHCHEM/IMCYTCHM 1ST ANTB: CPT | Performed by: PHYSICIAN ASSISTANT

## 2018-02-22 NOTE — LETTER
2/22/2018         RE: Leanne Zuñiga  [address] [facility] [address]        Dear Colleague,    Thank you for referring your patient, Leanne Zuñiga, to the [facility]. Please see a copy of my visit note below.    HPI:   Leanne Zuñiga is a 67 year old female who presents for Full skin cancer screening.  chief complaint  Last Skin Exam: n/a      1st Baseline: yes  Personal HX of Skin Cancer: no   Personal HX of Malignant Melanoma: no   Family HX of Skin Cancer / Malignant Melanoma: yes mother with melanoma; dec'd from this. Ocular melanoma.   Personal HX of Atypical Moles:   no  Risk factors: sun exposure; some tanning bed use  New / Changing lesions:yes spot on the shoulder taht is changing and is tender  Social History: lives near [place]  On review of systems, there are no further skin complaints, patient is feeling otherwise well.  See patient intake sheet.  ROS of the following were done and are negative: Constitutional, Eyes, Ears, Nose,   Mouth, Throat, Cardiovascular, Respiratory, GI, Genitourinary, Musculoskeletal,   Psychiatric, Endocrine, Allergic/Immunologic.    PHYSICAL EXAM:   Weight:  BP:   Skin exam performed as follows: Type 2 skin. Mood appropriate  Alert and Oriented X 3. Well developed, well nourished in no distress.  General appearance: Normal  Head including face: Normal  Eyes: conjunctiva and lids: Normal  Mouth: Lips, teeth, gums: Normal  Neck: Normal  Chest-breast/axillae: Normal  Back: Normal  Spleen and liver: Normal  Cardiovascular: Exam of peripheral vascular system by observation for swelling, varicosities, edema: Normal  Genitalia: groin, buttocks: Normal  Extremities: digits/nails (clubbing): Normal  Eccrine and Apocrine glands: Normal  Right upper extremity: Normal  Left upper extremity: Normal  Right lower extremity: Normal  Left lower extremity: Normal  Skin: Scalp and body hair: See below    Pt deferred exam of breasts, groin, buttocks:  No    Other physical findings:  1. Multiple pigmented macules on extremities and trunk  2. Multiple pigmented macules on face, trunk and extremities  3. Multiple vascular papules on trunk, arms and legs  4. Multiple scattered keratotic plaques  5. 14 x 9 mm brown irregular patch on left superior shoulder       Except as noted above, no other signs of skin cancer or melanoma.     ASSESSMENT/PLAN:   Benign Full skin cancer screening today. . Patient with history of none, fhx of melanoma (ocular melanoma) in mother  Advised on monthly self exams and 1 year  Patient Education: Appropriate brochures given.    Multiple benign appearing nevi on arms, legs and trunk. Discussed ABCDEs of melanoma and sunscreen.   Multiple lentigos on arms, legs and trunk. Advised benign, no treatment needed.  Multiple scattered angiomas. Advised benign, no treatment needed.   Seborrheic keratosis on arms, legs and trunk. Advised benign, no treatment needed.  R/o melanoma on left superior shoulder. Shave bx in typical fashion .  Area cleaned with betadyne and anesthetized with 1% lidocaine with epi .  Dermablade used to remove the lesion and sent to pathology. Bleeding was cauterized. Pt tolerated procedure well.  FHx of ocular melanoma in mother; mother dec'd from this. Advised on need for yearly FSE without fail        Follow-up: pending path/yearly FSE/PRN sooner    1.) Patient was asked about new and changing moles. YES  2.) Patient received a complete physical skin examination: YES  3.) Patient was counseled to perform a monthly self skin examination: YES  Scribed By: Misty Pro MS, PARADHA      Again, thank you for allowing me to participate in the care of your patient.        Sincerely,        Misty Pro PA-C

## 2018-02-22 NOTE — MR AVS SNAPSHOT
After Visit Summary   2/22/2018    Leanne Zuñiga    MRN: [id number]           Patient Information     Date Of Birth          1950        Visit Information        Provider Department      2/22/2018 1:00 PM Misty Pro PA-C [facility]        Today's Diagnoses     Neoplasm of uncertain behavior of skin    -  1    Nevus        Lentigo        Angioma        Seborrheic keratosis        Family history of melanoma          Care Instructions          Wound Care Instructions     FOR SUPERFICIAL WOUNDS     [facility] [phone]    [facility] [phone]                       AFTER 24 HOURS YOU SHOULD REMOVE THE BANDAGE AND BEGIN DAILY DRESSING CHANGES AS FOLLOWS:     1) Remove Dressing.     2) Clean and dry the area with tap water using a Q-tip or sterile gauze pad.     3) Apply Vaseline, Aquaphor, Polysporin ointment or Bacitracin ointment over entire wound.  Do NOT use Neosporin ointment.     4) Cover the wound with a band-aid, or a sterile non-stick gauze pad and micropore paper tape      REPEAT THESE INSTRUCTIONS AT LEAST ONCE A DAY UNTIL THE WOUND HAS COMPLETELY HEALED.    It is an old wives tale that a wound heals better when it is exposed to air and allowed to dry out. The wound will heal faster with a better cosmetic result if it is kept moist with ointment and covered with a bandage.    **Do not let the wound dry out.**      Supplies Needed:      *Cotton tipped applicators (Q-tips)    *Polysporin Ointment or Bacitracin Ointment (NOT NEOSPORIN)    *Band-aids or non-stick gauze pads and micropore paper tape.      PATIENT INFORMATION:    During the healing process you will notice a number of changes. All wounds develop a small halo of redness surrounding the wound.  This means healing is occurring. Severe itching with extensive redness usually indicates sensitivity to the ointment or bandage tape used to dress the wound.  You should call our office  if this develops.      Swelling  and/or discoloration around your surgical site is common, particularly when performed around the eye.    All wounds normally drain.  The larger the wound the more drainage there will be.  After 7-10 days, you will notice the wound beginning to shrink and new skin will begin to grow.  The wound is healed when you can see skin has formed over the entire area.  A healed wound has a healthy, shiny look to the surface and is red to dark pink in color to normalize.  Wounds may take approximately 4-6 weeks to heal.  Larger wounds may take 6-8 weeks.  After the wound is healed you may discontinue dressing changes.    You may experience a sensation of tightness as your wound heals. This is normal and will gradually subside.    Your healed wound may be sensitive to temperature changes. This sensitivity improves with time, but if you re having a lot of discomfort, try to avoid temperature extremes.    Patients frequently experience itching after their wound appears to have healed because of the continue healing under the skin.  Plain Vaseline will help relieve the itching.        POSSIBLE COMPLICATIONS    BLEEDIN. Leave the bandage in place.  2. Use tightly rolled up gauze or a cloth to apply direct pressure over the bandage for 30  minutes.  3. Reapply pressure for an additional 30 minutes if necessary  4. Use additional gauze and tape to maintain pressure once the bleeding has stopped.            Follow-ups after your visit        Your next 10 appointments already scheduled     2018  1:20 PM CST   New Visit with Darrell Russell MD   [facility] [place] ([facility] [place])    [id number] [facility]  [place] [address] [phone]              Who to contact     If you have questions or need follow up information about today's clinic visit or your schedule please contact [facility] directly at [phone].  Normal or non-critical lab and imaging  "results will be communicated to you by MyChart, letter or phone within 4 business days after the clinic has received the results. If you do not hear from us within 7 days, please contact the clinic through [employer] or phone. If you have a critical or abnormal lab result, we will notify you by phone as soon as possible.  Submit refill requests through [facility] or call your pharmacy and they will forward the refill request to us. Please allow 3 business days for your refill to be completed.          Additional Information About Your Visit        [employer] Information     [facility] lets you send messages to your doctor, view your test results, renew your prescriptions, schedule appointments and more. To sign up, go to [email or web address] . Click on \"Log in\" on the left side of the screen, which will take you to the Welcome page. Then click on \"Sign up Now\" on the right side of the page.     You will be asked to enter the access code listed below, as well as some personal information. Please follow the directions to create your username and password.     Your access code is: [email or web address]  Expires: 3/14/2018  3:53 PM     Your access code will  in 90 days. If you need help or a new code, please call your [place] clinic or [phone].        Care EveryWhere ID     This is your Care EveryWhere ID. This could be used by other organizations to access your [place] medical records  [id number]        Your Vitals Were     Pulse Pulse Oximetry                66 98%           Blood Pressure from Last 3 Encounters:   18 125/77   17 130/72   10/11/17 119/74    Weight from Last 3 Encounters:   17 66.2 kg (146 lb)   10/11/17 67.6 kg (149 lb)   17 69.1 kg (152 lb 6.4 oz)              We Performed the Following     BIOPSY SKIN/SUBQ/MUC MEM, SINGLE LESION     Surgical pathology exam        Primary Care Provider Office Phone # Fax #    Cleo Henry [phone]       [facility] " [address] [address]        Equal Access to Services     LUIS ANGEL MADDOX : Hadii nani Rice, vinicio bauer, ben almontemashanthi wright, lissy nicholsoncynthiaefrain mckeon. So [facility] [phone].    ATENCIÓN: Si habla español, tiene a arias disposición servicios gratuitos de asistencia lingüística. [facility] [phone].    We comply with applicable federal civil rights laws and [place] laws. We do not discriminate on the basis of race, color, national origin, age, disability, sex, sexual orientation, or gender identity.            Thank you!     Thank you for choosing [facility]  for your care. Our goal is always to provide you with excellent care. Hearing back from our patients is one way we can continue to improve our services. Please take a few minutes to complete the written survey that you may receive in the mail after your visit with us. Thank you!             Your Updated Medication List - Protect others around you: Learn how to safely use, store and throw away your medicines at www.disposemymeds.org.          This list is accurate as of 2/22/18  1:25 PM.  Always use your most recent med list.                   Brand Name Dispense Instructions for use Diagnosis    acetaminophen 325 MG tablet    TYLENOL    100 tablet    Take 2 tablets (650 mg) by mouth every 4 hours as needed for pain    RA (rheumatoid arthritis) (H)       ALOE VERA JUICE Liqd      2 oz twice daily    RA (rheumatoid arthritis) (H)       Alpha-Lipoic Acid 50 MG Caps      100-200 mg daily    RA (rheumatoid arthritis) (H)       ascorbic acid 500 MG tablet    VITAMIN C    30 tablet    Take 3 tablets (1,500 mg) by mouth daily    RA (rheumatoid arthritis) (H)       calcium-magnesium 500-250 MG Tabs per tablet    CALMAG     Take 1 tablet by mouth daily    RA (rheumatoid arthritis) (H)       diazepam 5 MG tablet    VALIUM    60 tablet    Take 1 tablet (5 mg) by mouth every 12 hours as needed for anxiety  or sleep    Rheumatoid arthritis involving multiple sites, unspecified rheumatoid factor presence (H)       HYDROcodone-acetaminophen 5-325 MG per tablet    NORCO    240 tablet    Take 1-2 tablets by mouth every 6 hours as needed for moderate to severe pain or pain    Rash and nonspecific skin eruption       ibuprofen 600 MG tablet    ADVIL/MOTRIN    90 tablet    Take 1 tablet (600 mg) by mouth every 6 hours as needed for pain    RA (rheumatoid arthritis) (H)       Krill Oil 1000 MG Caps      Taking 2 daily.        niacin 500 MG tablet      Take 1 tablet (500 mg) by mouth daily (with breakfast) Takes 2000 mg daily    RA (rheumatoid arthritis) (H)       * OVER-THE-COUNTER      Tumeric        * OVER-THE-COUNTER      Megastress B with vitamin C 2 tablets daily    RA (rheumatoid arthritis) (H)       * OVER-THE-COUNTER      Powdered barely grass 1 tablespoon daily    RA (rheumatoid arthritis) (H)       * OVER-THE-COUNTER      Bio-Flex.    RA (rheumatoid arthritis) (H)       * OVER-THE-COUNTER      Ashwagandha 400 mg daily    RA (rheumatoid arthritis) (H)       predniSONE 1 MG tablet    DELTASONE    120 tablet    Take 4 tablets (4 mg) by mouth daily    Rheumatoid arthritis of multiple sites without rheumatoid factor (H)       vitamin D 2000 UNITS tablet     100 tablet    Take 2,000 Units by mouth Twice weekly    RA (rheumatoid arthritis) (H)       * Notice:  This list has 5 medication(s) that are the same as other medications prescribed for you. Read the directions carefully, and ask your doctor or other care provider to review them with you.

## 2018-02-22 NOTE — PATIENT INSTRUCTIONS
Wound Care Instructions     FOR SUPERFICIAL WOUNDS     [facility] [phone]    [facility] [phone]                       AFTER 24 HOURS YOU SHOULD REMOVE THE BANDAGE AND BEGIN DAILY DRESSING CHANGES AS FOLLOWS:     1) Remove Dressing.     2) Clean and dry the area with tap water using a Q-tip or sterile gauze pad.     3) Apply Vaseline, Aquaphor, Polysporin ointment or Bacitracin ointment over entire wound.  Do NOT use Neosporin ointment.     4) Cover the wound with a band-aid, or a sterile non-stick gauze pad and micropore paper tape      REPEAT THESE INSTRUCTIONS AT LEAST ONCE A DAY UNTIL THE WOUND HAS COMPLETELY HEALED.    It is an old wives tale that a wound heals better when it is exposed to air and allowed to dry out. The wound will heal faster with a better cosmetic result if it is kept moist with ointment and covered with a bandage.    **Do not let the wound dry out.**      Supplies Needed:      *Cotton tipped applicators (Q-tips)    *Polysporin Ointment or Bacitracin Ointment (NOT NEOSPORIN)    *Band-aids or non-stick gauze pads and micropore paper tape.      PATIENT INFORMATION:    During the healing process you will notice a number of changes. All wounds develop a small halo of redness surrounding the wound.  This means healing is occurring. Severe itching with extensive redness usually indicates sensitivity to the ointment or bandage tape used to dress the wound.  You should call our office if this develops.      Swelling  and/or discoloration around your surgical site is common, particularly when performed around the eye.    All wounds normally drain.  The larger the wound the more drainage there will be.  After 7-10 days, you will notice the wound beginning to shrink and new skin will begin to grow.  The wound is healed when you can see skin has formed over the entire area.  A healed wound has a healthy, shiny look to the surface and is red to dark pink in color  to normalize.  Wounds may take approximately 4-6 weeks to heal.  Larger wounds may take 6-8 weeks.  After the wound is healed you may discontinue dressing changes.    You may experience a sensation of tightness as your wound heals. This is normal and will gradually subside.    Your healed wound may be sensitive to temperature changes. This sensitivity improves with time, but if you re having a lot of discomfort, try to avoid temperature extremes.    Patients frequently experience itching after their wound appears to have healed because of the continue healing under the skin.  Plain Vaseline will help relieve the itching.        POSSIBLE COMPLICATIONS    BLEEDIN. Leave the bandage in place.  2. Use tightly rolled up gauze or a cloth to apply direct pressure over the bandage for 30  minutes.  3. Reapply pressure for an additional 30 minutes if necessary  4. Use additional gauze and tape to maintain pressure once the bleeding has stopped.

## 2018-02-22 NOTE — NURSING NOTE
"Chief Complaint   Patient presents with     Derm Problem     check spot on shoulder       Initial /77  Pulse 66  SpO2 98% Estimated body mass index is 22.2 kg/(m^2) as calculated from the following:    Height as of 4/12/17: 1.727 m (5' 8\").    Weight as of 12/14/17: 66.2 kg (146 lb).  BP completed using cuff size: sandor Rob LPN    "

## 2018-02-22 NOTE — PROGRESS NOTES
HPI:   Leanne Zuñiga is a 67 year old female who presents for Full skin cancer screening.  chief complaint  Last Skin Exam: n/a      1st Baseline: yes  Personal HX of Skin Cancer: no   Personal HX of Malignant Melanoma: no   Family HX of Skin Cancer / Malignant Melanoma: yes mother with melanoma; dec'd from this. Ocular melanoma.   Personal HX of Atypical Moles:   no  Risk factors: sun exposure; some tanning bed use  New / Changing lesions:yes spot on the shoulder taht is changing and is tender  Social History: lives near [place]  On review of systems, there are no further skin complaints, patient is feeling otherwise well.  See patient intake sheet.  ROS of the following were done and are negative: Constitutional, Eyes, Ears, Nose,   Mouth, Throat, Cardiovascular, Respiratory, GI, Genitourinary, Musculoskeletal,   Psychiatric, Endocrine, Allergic/Immunologic.    PHYSICAL EXAM:   Weight:  BP:   Skin exam performed as follows: Type 2 skin. Mood appropriate  Alert and Oriented X 3. Well developed, well nourished in no distress.  General appearance: Normal  Head including face: Normal  Eyes: conjunctiva and lids: Normal  Mouth: Lips, teeth, gums: Normal  Neck: Normal  Chest-breast/axillae: Normal  Back: Normal  Spleen and liver: Normal  Cardiovascular: Exam of peripheral vascular system by observation for swelling, varicosities, edema: Normal  Genitalia: groin, buttocks: Normal  Extremities: digits/nails (clubbing): Normal  Eccrine and Apocrine glands: Normal  Right upper extremity: Normal  Left upper extremity: Normal  Right lower extremity: Normal  Left lower extremity: Normal  Skin: Scalp and body hair: See below    Pt deferred exam of breasts, groin, buttocks: No    Other physical findings:  1. Multiple pigmented macules on extremities and trunk  2. Multiple pigmented macules on face, trunk and extremities  3. Multiple vascular papules on trunk, arms and legs  4. Multiple scattered keratotic plaques  5. 14 x  9 mm brown irregular patch on left superior shoulder       Except as noted above, no other signs of skin cancer or melanoma.     ASSESSMENT/PLAN:   Benign Full skin cancer screening today. . Patient with history of none, fhx of melanoma (ocular melanoma) in mother  Advised on monthly self exams and 1 year  Patient Education: Appropriate brochures given.    Multiple benign appearing nevi on arms, legs and trunk. Discussed ABCDEs of melanoma and sunscreen.   Multiple lentigos on arms, legs and trunk. Advised benign, no treatment needed.  Multiple scattered angiomas. Advised benign, no treatment needed.   Seborrheic keratosis on arms, legs and trunk. Advised benign, no treatment needed.  R/o melanoma on left superior shoulder. Shave bx in typical fashion .  Area cleaned with betadyne and anesthetized with 1% lidocaine with epi .  Dermablade used to remove the lesion and sent to pathology. Bleeding was cauterized. Pt tolerated procedure well.  FHx of ocular melanoma in mother; mother dec'd from this. Advised on need for yearly FSE without fail        Follow-up: pending path/yearly FSE/PRN sooner    1.) Patient was asked about new and changing moles. YES  2.) Patient received a complete physical skin examination: YES  3.) Patient was counseled to perform a monthly self skin examination: YES  Scribed By: Misty Pro, MS, PARADHA

## 2018-03-01 ENCOUNTER — TELEPHONE (OUTPATIENT)
Dept: DERMATOLOGY | Facility: CLINIC | Age: 68
End: 2018-03-01

## 2018-03-01 ENCOUNTER — HOSPITAL PATHOLOGY (OUTPATIENT)
Dept: OTHER | Facility: CLINIC | Age: 68
End: 2018-03-01

## 2018-03-01 NOTE — TELEPHONE ENCOUNTER
"Patient called back and has concerns about \"being on Orencia and if it comes back as a Melanoma would I continue to be on that drug?.. It is working for me and I would hate to have to stop taking it..\"     I did advise that we do not know as of yet Dermatopathologist diagnosis, so we would need to wait for that reading, but as of now, no reason to stop or interfere with her taking Orencia as currently diagnosis is a mildly Atypical nevus of the L shoulder and Provider suspects Dermatopathology report to return as same, but we will have to wait until report returns and go from there.     Patient stated: \"Well, maybe she can check that out in the meantime, so if it does come back as a Melanoma we can have a plan?..\"    Starla Nava RN         "

## 2018-03-01 NOTE — TELEPHONE ENCOUNTER
I did speak to patient and did review Provider notes below and advised when 2nd opinion returns from Dermatopathologist, we will let her know.    Patient verbalized understanding. Starla Nava RN

## 2018-03-01 NOTE — TELEPHONE ENCOUNTER
Please call patient back - Frida was trying to help me with my inbox and resulted it, not knowing that I had it sent for a second opinion.     I had it sent to dermatopathology at the [facility] for a second opinion. Initial read was a mildly atypical nevus, which is likely the correct diagnosis. Because of how unusual it looked clinically and because of her family history, I just wanted a second opinion by a dermatopathologist. Yes it typically takes at least 1 week.     Sorry for the confusion!

## 2018-03-01 NOTE — TELEPHONE ENCOUNTER
"Spoke to patient who would like to speak to Misty Pro PA-C today as she \"was told the results would be in in a week and it has been 7 days today and I want to know what my results are\"     I did tell her her slides were sent out for a second opinion by another Pathologist. (She wants to know who they were sent to, how long will that take?)     (She was very upset and wanted to know why she wasn't called with results- I did tell her it usually takes 7 to 10 Business days for a result to return, but her result is in, but perhaps Provider suspected a different result and maybe that is why it was sent out for 2nd opinion?)    Please call patient directly when able at [phone].    Stalra Nava RN    "

## 2018-03-01 NOTE — TELEPHONE ENCOUNTER
Pt called regarding her pathology results from 02/22/18 and would like a return call when the results become available. Please advise.    Alanna Sedgwick  [facility]

## 2018-03-01 NOTE — TELEPHONE ENCOUNTER
Notes Recorded by Pretty Willson PA-C on 3/1/2018 at 11:15 AM  Radha Perdomo,    The biopsy taken from Skin, left superior shoulder was a Dysplastic compound nevus with mild atypia. This is not a cancer. It does not need to be removed. Per pathology if pigment still remains or if lesions grows back follow up for evaluation.    Thank you,    Pretty Willson PA-C    Called patient and notified of results states that she already spoke with Starla CORNEJO- they are sending results to another lab and will await those results.    Ayana JOSHI RN  [facility]  [phone]  [facility]   [phone]

## 2018-03-06 LAB — COPATH REPORT: NORMAL

## 2018-03-07 LAB — COPATH REPORT: NORMAL

## 2018-03-20 ENCOUNTER — OFFICE VISIT (OUTPATIENT)
Dept: DERMATOLOGY | Facility: CLINIC | Age: 68
End: 2018-03-20
Payer: MEDICARE

## 2018-03-20 VITALS — SYSTOLIC BLOOD PRESSURE: 116 MMHG | HEIGHT: 68 IN | HEART RATE: 62 BPM | DIASTOLIC BLOOD PRESSURE: 73 MMHG

## 2018-03-20 DIAGNOSIS — C43.62 MELANOMA OF SHOULDER, LEFT (H): Primary | ICD-10-CM

## 2018-03-20 PROCEDURE — 88342 IMHCHEM/IMCYTCHM 1ST ANTB: CPT | Mod: 59 | Performed by: DERMATOLOGY

## 2018-03-20 PROCEDURE — 17313 MOHS 1 STAGE T/A/L: CPT | Performed by: DERMATOLOGY

## 2018-03-20 PROCEDURE — 13121 CMPLX RPR S/A/L 2.6-7.5 CM: CPT | Mod: 51 | Performed by: DERMATOLOGY

## 2018-03-20 PROCEDURE — 88341 IMHCHEM/IMCYTCHM EA ADD ANTB: CPT | Performed by: DERMATOLOGY

## 2018-03-20 NOTE — NURSING NOTE
Surgical Office Location :   [facility]  [address] [place] [address]

## 2018-03-20 NOTE — PATIENT INSTRUCTIONS
Sutured Wound Care     [facility]: [phone]    [facility]: [phone]          ? No strenuous activity for 48 hours. Resume moderate activity in 48 hours. No heavy exercising until you are seen for follow up in one week.     ? Take Tylenol as needed for discomfort.                         ? Do not drink alcoholic beverages for 48 hours.     ? Keep the pressure bandage in place for 24 hours. If the bandage becomes blood tinged or loose, reinforce it with gauze and tape.        (Refer to the reverse side of this page for management of bleeding).    ? Remove pressure bandage in 24 hours     ? Leave the flat bandage in place until your follow up appointment.    ? Keep the bandage dry. Wash around it carefully.    ? If the tape becomes soiled or starts to come off, reinforce it with additional paper tape.    ? Do not smoke for 3 weeks; smoking is detrimental to wound healing.    ? It is normal to have swelling and bruising around the surgical site. The bruising will fade in approximately 10-14 days. Elevate the area to reduce swelling.    ? Numbness, itchiness and sensitivity to temperature changes can occur after surgery and may take up to 18 months to normalize.      POSSIBLE COMPLICATIONS    BLEEDIN. Leave the bandage in place.  2. Use tightly rolled up gauze or a cloth to apply direct pressure over the bandage for 20   minutes.  3. Reapply pressure for an additional 20 minutes if necessary  4. Call the office or go to the nearest emergency room if pressure fails to stop the bleeding.  5. Use additional gauze and tape to maintain pressure once the bleeding has stopped.        PAIN:    1. Post operative pain should slowly get better, never worse.  2. A severe increase in pain may indicate a problem. Call the office if this occurs.    In case of emergency phone:Dr Singh [phone]

## 2018-03-20 NOTE — MR AVS SNAPSHOT
After Visit Summary   3/20/2018    Leanne Zuñiga    MRN: [id number]           Patient Information     Date Of Birth          1950        Visit Information        Provider Department      3/20/2018 8:00 AM Kavin Singh MD [facility]        Today's Diagnoses     Melanoma of shoulder, left (H)    -  1      Care Instructions      Sutured Wound Care     [facility]: [phone]    [facility]: [phone]          ? No strenuous activity for 48 hours. Resume moderate activity in 48 hours. No heavy exercising until you are seen for follow up in one week.     ? Take Tylenol as needed for discomfort.                         ? Do not drink alcoholic beverages for 48 hours.     ? Keep the pressure bandage in place for 24 hours. If the bandage becomes blood tinged or loose, reinforce it with gauze and tape.        (Refer to the reverse side of this page for management of bleeding).    ? Remove pressure bandage in 24 hours     ? Leave the flat bandage in place until your follow up appointment.    ? Keep the bandage dry. Wash around it carefully.    ? If the tape becomes soiled or starts to come off, reinforce it with additional paper tape.    ? Do not smoke for 3 weeks; smoking is detrimental to wound healing.    ? It is normal to have swelling and bruising around the surgical site. The bruising will fade in approximately 10-14 days. Elevate the area to reduce swelling.    ? Numbness, itchiness and sensitivity to temperature changes can occur after surgery and may take up to 18 months to normalize.      POSSIBLE COMPLICATIONS    BLEEDIN. Leave the bandage in place.  2. Use tightly rolled up gauze or a cloth to apply direct pressure over the bandage for 20   minutes.  3. Reapply pressure for an additional 20 minutes if necessary  4. Call the office or go to the nearest emergency room if pressure fails to stop the bleeding.  5. Use additional gauze and  "tape to maintain pressure once the bleeding has stopped.        PAIN:    1. Post operative pain should slowly get better, never worse.  2. A severe increase in pain may indicate a problem. Call the office if this occurs.    In case of emergency phone:Dr Singh [phone]            Follow-ups after your visit        Who to contact     If you have questions or need follow up information about today's clinic visit or your schedule please contact [facility] directly at [phone].  Normal or non-critical lab and imaging results will be communicated to you by [employer], letter or phone within 4 business days after the clinic has received the results. If you do not hear from us within 7 days, please contact the clinic through [employer] or phone. If you have a critical or abnormal lab result, we will notify you by phone as soon as possible.  Submit refill requests through [employer] or call your pharmacy and they will forward the refill request to us. Please allow 3 business days for your refill to be completed.          Additional Information About Your Visit        [employer] information companyBackus Hospital[facility] reviews Information     [employer] lets you send messages to your doctor, view your test results, renew your prescriptions, schedule appointments and more. To sign up, go to [email or web address] . Click on \"Log in\" on the left side of the screen, which will take you to the Welcome page. Then click on \"Sign up Now\" on the right side of the page.     You will be asked to enter the access code listed below, as well as some personal information. Please follow the directions to create your username and password.     Your access code is: [email or web address]  Expires: 2018 11:09 AM     Your access code will  in 90 days. If you need help or a new code, please call your [facility] [place] or [phone].        Care EveryWhere ID     This is your Care EveryWhere ID. This could be used by other organizations to access your [facility] " "records  [id number]        Your Vitals Were     Pulse Height                62 1.715 m (5' 7.5\")           Blood Pressure from Last 3 Encounters:   03/20/18 116/73   02/22/18 125/77   12/14/17 130/72    Weight from Last 3 Encounters:   12/14/17 66.2 kg (146 lb)   10/11/17 67.6 kg (149 lb)   08/09/17 69.1 kg (152 lb 6.4 oz)              We Performed the Following     CL IMMUNOHISTOCHEMICAL STAIN      MOHS TRUNK/ARM/LEG 1ST STAGE UP T0 5 BLOCKS     REPAIR COMPLEX, WOUND SCALP/ARM/LEG 2.6-7.5 CM        Primary Care Provider Office Phone # Fax #    Cleo Henry [phone]       [facility] [address] [address]        Equal Access to Services     LUIS ANGEL MADDOX : Hadii aad ku hadasho Sobalaji, waaxda luqadaha, qaybta kaalmada adriana, lissy enrique . So [facility] [phone].    ATENCIÓN: Si habla español, tiene a arias disposición servicios gratuitos de asistencia lingüística. Colt al [phone].    We comply with applicable federal civil rights laws and [place] laws. We do not discriminate on the basis of race, color, national origin, age, disability, sex, sexual orientation, or gender identity.            Thank you!     Thank you for choosing [facility]  for your care. Our goal is always to provide you with excellent care. Hearing back from our patients is one way we can continue to improve our services. Please take a few minutes to complete the written survey that you may receive in the mail after your visit with us. Thank you!             Your Updated Medication List - Protect others around you: Learn how to safely use, store and throw away your medicines at www.disposemymeds.org.          This list is accurate as of 3/20/18 11:38 AM.  Always use your most recent med list.                   Brand Name Dispense Instructions for use Diagnosis    acetaminophen 325 MG tablet    TYLENOL    100 tablet    Take 2 tablets (650 mg) by " mouth every 4 hours as needed for pain    RA (rheumatoid arthritis) (H)       ALOE VERA JUICE Liqd      2 oz twice daily    RA (rheumatoid arthritis) (H)       Alpha-Lipoic Acid 50 MG Caps      100-200 mg daily    RA (rheumatoid arthritis) (H)       ascorbic acid 500 MG tablet    VITAMIN C    30 tablet    Take 3 tablets (1,500 mg) by mouth daily    RA (rheumatoid arthritis) (H)       calcium-magnesium 500-250 MG Tabs per tablet    CALMAG     Take 1 tablet by mouth daily    RA (rheumatoid arthritis) (H)       diazepam 5 MG tablet    VALIUM    60 tablet    Take 1 tablet (5 mg) by mouth every 12 hours as needed for anxiety or sleep    Rheumatoid arthritis involving multiple sites, unspecified rheumatoid factor presence (H)       HYDROcodone-acetaminophen 5-325 MG per tablet    NORCO    240 tablet    Take 1-2 tablets by mouth every 6 hours as needed for moderate to severe pain or pain    Rash and nonspecific skin eruption       ibuprofen 600 MG tablet    ADVIL/MOTRIN    90 tablet    Take 1 tablet (600 mg) by mouth every 6 hours as needed for pain    RA (rheumatoid arthritis) (H)       Krill Oil 1000 MG Caps      Taking 2 daily.        niacin 500 MG tablet      Take 1 tablet (500 mg) by mouth daily (with breakfast) Takes 2000 mg daily    RA (rheumatoid arthritis) (H)       * OVER-THE-COUNTER      Tumeric        * OVER-THE-COUNTER      Megastress B with vitamin C 2 tablets daily    RA (rheumatoid arthritis) (H)       * OVER-THE-COUNTER      Powdered barely grass 1 tablespoon daily    RA (rheumatoid arthritis) (H)       * OVER-THE-COUNTER      Bio-Flex.    RA (rheumatoid arthritis) (H)       * OVER-THE-COUNTER      Ashwagandha 400 mg daily    RA (rheumatoid arthritis) (H)       predniSONE 1 MG tablet    DELTASONE    120 tablet    Take 4 tablets (4 mg) by mouth daily    Rheumatoid arthritis of multiple sites without rheumatoid factor (H)       vitamin D 2000 UNITS tablet     100 tablet    Take 2,000 Units by mouth Twice  weekly    RA (rheumatoid arthritis) (H)       * Notice:  This list has 5 medication(s) that are the same as other medications prescribed for you. Read the directions carefully, and ask your doctor or other care provider to review them with you.

## 2018-03-20 NOTE — LETTER
3/20/2018         RE: Leanne Zuñiga  [address] [address]        Dear Colleague,    Thank you for referring your patient, Leanne Zuñiga, to the [facility]. Please see a copy of my visit note below.    Leanne Zuñiga is a 67 year old year old female patient here today for evaluation and managment of 0.4mm melanoma on left shoulder.  Patient reports the following previous treatments none.  Patient reports the following modifying factors none.  Associated symptoms: none.  Patient has no other skin complaints today.  Remainder of the HPI, Meds, PMH, Allergies, FH, and SH was reviewed in chart.    Pertinent Hx:   mther melanoma   Past Medical History:   Diagnosis Date     Depressive disorder, not elsewhere classified 1990    stopped depression meds after 6 weeks of use     Malignant melanoma (H)      Pneumonia age 7       Past Surgical History:   Procedure Laterality Date     SURGICAL HISTORY OF -   age 7    T & A        Family History   Problem Relation Age of Onset     HEART DISEASE Father      Sudden death at age 68     Lipids Father      C.A.D. Father      Depression Mother      Suicidal     CANCER Mother      Occular Melanoma     Breast Cancer Mother      Anesthesia Reaction Mother      GASTROINTESTINAL DISEASE Mother      Ulcer     Respiratory Mother      Emphysema     Melanoma Mother      Depression Sister      Arthritis Sister      oseto artthritis     HEART DISEASE Paternal Grandmother      Alcohol/Drug Paternal Grandfather      Allergies Brother      GASTROINTESTINAL DISEASE Sister      Ulcer       Social History     Social History     Marital status: Single     Spouse name: N/A     Number of children: N/A     Years of education: N/A     Occupational History     Not on file.     Social History Main Topics     Smoking status: Former Smoker     Packs/day: 0.20     Years: 15.00     Quit date: 4/1/2011     Smokeless tobacco: Never Used     Alcohol use Yes       Comment: social wine weekly     Drug use: No     Sexual activity: Not Currently     Other Topics Concern     Parent/Sibling W/ Cabg, Mi Or Angioplasty Before 65f 55m? No     Social History Narrative       Outpatient Encounter Prescriptions as of 3/20/2018   Medication Sig Dispense Refill     predniSONE (DELTASONE) 1 MG tablet Take 4 tablets (4 mg) by mouth daily 120 tablet 5     HYDROcodone-acetaminophen (NORCO) 5-325 MG per tablet Take 1-2 tablets by mouth every 6 hours as needed for moderate to severe pain or pain 240 tablet 0     diazepam (VALIUM) 5 MG tablet Take 1 tablet (5 mg) by mouth every 12 hours as needed for anxiety or sleep 60 tablet 0     calcium-magnesium (CALMAG) 500-250 MG TABS Take 1 tablet by mouth daily       niacin 500 MG tablet Take 1 tablet (500 mg) by mouth daily (with breakfast) Takes 2000 mg daily       OVER-THE-COUNTER Bio-Flex.       OVER-THE-COUNTER Ashwagandha 400 mg daily       Krill Oil 1000 MG CAPS Taking 2 daily.       OVER-THE-COUNTER Tumeric       Cholecalciferol (VITAMIN D) 2000 UNITS tablet Take 2,000 Units by mouth Twice weekly 100 tablet 3     OVER-THE-COUNTER Megastress B with vitamin C 2 tablets daily       OVER-THE-COUNTER Powdered barely grass 1 tablespoon daily       ascorbic acid (VITAMIN C) 500 MG tablet Take 3 tablets (1,500 mg) by mouth daily 30 tablet      Alpha-Lipoic Acid 50 MG CAPS 100-200 mg daily       ibuprofen (ADVIL,MOTRIN) 600 MG tablet Take 1 tablet (600 mg) by mouth every 6 hours as needed for pain 90 tablet 1     acetaminophen (TYLENOL) 325 MG tablet Take 2 tablets (650 mg) by mouth every 4 hours as needed for pain 100 tablet 0     ALOE VERA JUICE LIQD 2 oz twice daily       No facility-administered encounter medications on file as of 3/20/2018.              Review Of Systems  Skin: As above  Eyes: negative  Ears/Nose/Throat: negative  Respiratory: No shortness of breath, dyspnea on exertion, cough, or hemoptysis  Cardiovascular:  "negative  Gastrointestinal: negative  Genitourinary: negative  Musculoskeletal: negative  Neurologic: negative  Psychiatric: negative  Hematologic/Lymphatic/Immunologic: negative  Endocrine: negative      O:   NAD, WDWN, Alert & Oriented, Mood & Affect wnl, Vitals stable   Here today alone   /73  Pulse 62  Ht 1.715 m (5' 7.5\")   General appearance normal   Vitals stable   Alert, oriented and in no acute distress      Following lymph nodes palpated: Occipital, Cervical, Supraclavicular , axilla no lad   L shoulder 1.5x0.9cm red plaque       Eyes: Conjunctivae/lids:Normal     ENT: Lips, buccal mucosa, tongue: normal    MSK:Normal    Cardiovascular: peripheral edema none    Pulm: Breathing Normal    Lymph Nodes: No Head and Neck Lymphadenopathy     Neuro/Psych: Orientation:Normal; Mood/Affect:Normal      A/P:  1. l shoulder 0.4mm melanoma  MOHS MART-1:  I discussed the specifics of tumor, prognosis, metachronous melanoma, self exam, and genetics with the patient. I explained the need for monthly skin exams including palpating lymph nodes, and taught the patient how to do this. Patient was asked about new or changing moles. I reviewed treatment options, including a discussion of wide excision (the gold standard) versus Mohs surgery with MART-1 immunostains.     Note: MART-1 (Melanoma Antigen Recognized by T-cells) antibody immunostaining was used during Mohs surgery as per standard protocol, in addition to routine processing of all specimens with hematoxylin and eosin. The peripheral margins/edges were processed with the MART-1 stain (4 specimens total). The center was examined with hematoxylin & eosin and MART-1 immunostains. The patient was informed of the procedure and its risk/benefits during the consent for the procedure.    One or more of the reagents used in immunohistochemical testing in this case may not have been cleared or approved by the U.S. Food and Drug Administration (FDA). The FDA has determined " that such clearance or approval is not necessary. These tests are used for clinical purposes. They should not be regarded as investigational or for research. These reagents  performance characteristics have been determined by [facility]. This laboratory is certified under the Clinical Laboratory Improvement Amendments of 1988 (CLIA-88) as qualified to perform high complexity clinical laboratory testing.    After [facility] discussed with patient, decision for Mohs surgery was made. Indication for Mohs was aggressive histology. Patient confirmed the site with Dr. Singh. After anesthesia with LEC, the tumor was excised using standard Mohs technique in 1 stages(s).  MART 1 stains were performed on 4 specimens. CLEAR MARGINS OBTAINED and Final defect size was 2.7 cm.     REPAIR COMPLEX: Because of the tightness of the surrounding skin and Because of the size and full thickness nature of the defect, a complex closure was planned. After LEC anesthesia and prep, Burow's triangles were excised in the relaxed skin tension lines. The wound edges were widely undermined by dissection in the subcutaneous plane until adequate tissue mobility was obtained. Hemostasis was obtained. The wound edges were closed in a layered fashion using Vicryl and Fast Absorbing Plain Gut sutures. Postoperative length was 4.8 cm.   EBL minimal; complications none; wound care routine.  The patient was discharged in good condition and will return in one week for wound evaluation.    BENIGN LESIONS DISCUSSED WITH PATIENT:  I discussed the specifics of tumor, prognosis, and genetics of benign lesions.  I explained that treatment of these lesions would be purely cosmetic and not medically neccessary.  I discussed with patient different removal options including excision, cautery and /or laser.      Nature and genetics of benign skin lesions dicussed with patient.  Signs and Symptoms of skin cancer discussed with patient.  ABCDEs of melanoma  reviewed with patient.  Patient encouraged to perform monthly skin exams.  UV precautions reviewed with patient.  Patient to follow up with Primary Care provider regarding elevated blood pressure.  Skin care regimen reviewed with patient: Eliminate harsh soaps, i.e. Dial, zest, irsih spring; Mild soaps such as Cetaphil or Dove sensitive skin, avoid hot or cold showers, aggressive use of emollients including vanicream, cetaphil or cerave discussed with patient.    Risks of non-melanoma skin cancer discussed with patient   Return to clinic 3 months      CASTLE TESTING discussed with patient sent out today       Again, thank you for allowing me to participate in the care of your patient.        Sincerely,        Kavin Singh MD

## 2018-03-20 NOTE — PROGRESS NOTES
Leanne Zuñiga is a 67 year old year old female patient here today for evaluation and managment of 0.4mm melanoma on left shoulder.  Patient reports the following previous treatments none.  Patient reports the following modifying factors none.  Associated symptoms: none.  Patient has no other skin complaints today.  Remainder of the HPI, Meds, PMH, Allergies, FH, and SH was reviewed in chart.    Pertinent Hx:   mther melanoma   Past Medical History:   Diagnosis Date     Depressive disorder, not elsewhere classified 1990    stopped depression meds after 6 weeks of use     Malignant melanoma (H)      Pneumonia age 7       Past Surgical History:   Procedure Laterality Date     SURGICAL HISTORY OF -   age 7    T & A        Family History   Problem Relation Age of Onset     HEART DISEASE Father      Sudden death at age 68     Lipids Father      C.A.D. Father      Depression Mother      Suicidal     CANCER Mother      Occular Melanoma     Breast Cancer Mother      Anesthesia Reaction Mother      GASTROINTESTINAL DISEASE Mother      Ulcer     Respiratory Mother      Emphysema     Melanoma Mother      Depression Sister      Arthritis Sister      oseto artthritis     HEART DISEASE Paternal Grandmother      Alcohol/Drug Paternal Grandfather      Allergies Brother      GASTROINTESTINAL DISEASE Sister      Ulcer       Social History     Social History     Marital status: Single     Spouse name: N/A     Number of children: N/A     Years of education: N/A     Occupational History     Not on file.     Social History Main Topics     Smoking status: Former Smoker     Packs/day: 0.20     Years: 15.00     Quit date: 4/1/2011     Smokeless tobacco: Never Used     Alcohol use Yes      Comment: social wine weekly     Drug use: No     Sexual activity: Not Currently     Other Topics Concern     Parent/Sibling W/ Cabg, Mi Or Angioplasty Before 65f 55m? No     Social History Narrative       Outpatient Encounter Prescriptions as of  3/20/2018   Medication Sig Dispense Refill     predniSONE (DELTASONE) 1 MG tablet Take 4 tablets (4 mg) by mouth daily 120 tablet 5     HYDROcodone-acetaminophen (NORCO) 5-325 MG per tablet Take 1-2 tablets by mouth every 6 hours as needed for moderate to severe pain or pain 240 tablet 0     diazepam (VALIUM) 5 MG tablet Take 1 tablet (5 mg) by mouth every 12 hours as needed for anxiety or sleep 60 tablet 0     calcium-magnesium (CALMAG) 500-250 MG TABS Take 1 tablet by mouth daily       niacin 500 MG tablet Take 1 tablet (500 mg) by mouth daily (with breakfast) Takes 2000 mg daily       OVER-THE-COUNTER Bio-Flex.       OVER-THE-COUNTER Ashwagandha 400 mg daily       Krill Oil 1000 MG CAPS Taking 2 daily.       OVER-THE-COUNTER Tumeric       Cholecalciferol (VITAMIN D) 2000 UNITS tablet Take 2,000 Units by mouth Twice weekly 100 tablet 3     OVER-THE-COUNTER Megastress B with vitamin C 2 tablets daily       OVER-THE-COUNTER Powdered barely grass 1 tablespoon daily       ascorbic acid (VITAMIN C) 500 MG tablet Take 3 tablets (1,500 mg) by mouth daily 30 tablet      Alpha-Lipoic Acid 50 MG CAPS 100-200 mg daily       ibuprofen (ADVIL,MOTRIN) 600 MG tablet Take 1 tablet (600 mg) by mouth every 6 hours as needed for pain 90 tablet 1     acetaminophen (TYLENOL) 325 MG tablet Take 2 tablets (650 mg) by mouth every 4 hours as needed for pain 100 tablet 0     ALOE VERA JUICE LIQD 2 oz twice daily       No facility-administered encounter medications on file as of 3/20/2018.              Review Of Systems  Skin: As above  Eyes: negative  Ears/Nose/Throat: negative  Respiratory: No shortness of breath, dyspnea on exertion, cough, or hemoptysis  Cardiovascular: negative  Gastrointestinal: negative  Genitourinary: negative  Musculoskeletal: negative  Neurologic: negative  Psychiatric: negative  Hematologic/Lymphatic/Immunologic: negative  Endocrine: negative      O:   NAD, WDWN, Alert & Oriented, Mood & Affect wnl, Vitals  "stable   Here today alone   /73  Pulse 62  Ht 1.715 m (5' 7.5\")   General appearance normal   Vitals stable   Alert, oriented and in no acute distress      Following lymph nodes palpated: Occipital, Cervical, Supraclavicular , axilla no lad   L shoulder 1.5x0.9cm red plaque       Eyes: Conjunctivae/lids:Normal     ENT: Lips, buccal mucosa, tongue: normal    MSK:Normal    Cardiovascular: peripheral edema none    Pulm: Breathing Normal    Lymph Nodes: No Head and Neck Lymphadenopathy     Neuro/Psych: Orientation:Normal; Mood/Affect:Normal      A/P:  1. l shoulder 0.4mm melanoma  MOHS MART-1:  I discussed the specifics of tumor, prognosis, metachronous melanoma, self exam, and genetics with the patient. I explained the need for monthly skin exams including palpating lymph nodes, and taught the patient how to do this. Patient was asked about new or changing moles. I reviewed treatment options, including a discussion of wide excision (the gold standard) versus Mohs surgery with MART-1 immunostains.     Note: MART-1 (Melanoma Antigen Recognized by T-cells) antibody immunostaining was used during Mohs surgery as per standard protocol, in addition to routine processing of all specimens with hematoxylin and eosin. The peripheral margins/edges were processed with the MART-1 stain (4 specimens total). The center was examined with hematoxylin & eosin and MART-1 immunostains. The patient was informed of the procedure and its risk/benefits during the consent for the procedure.    One or more of the reagents used in immunohistochemical testing in this case may not have been cleared or approved by the U.S. Food and Drug Administration (FDA). The FDA has determined that such clearance or approval is not necessary. These tests are used for clinical purposes. They should not be regarded as investigational or for research. These reagents  performance characteristics have been determined by [facility]. This " laboratory is certified under the Clinical Laboratory Improvement Amendments of 1988 (CLIA-88) as qualified to perform high complexity clinical laboratory testing.    After [facility] discussed with patient, decision for Mohs surgery was made. Indication for Mohs was aggressive histology. Patient confirmed the site with Dr. Singh. After anesthesia with LEC, the tumor was excised using standard Mohs technique in 1 stages(s).  MART 1 stains were performed on 4 specimens. CLEAR MARGINS OBTAINED and Final defect size was 2.7 cm.     REPAIR COMPLEX: Because of the tightness of the surrounding skin and Because of the size and full thickness nature of the defect, a complex closure was planned. After LEC anesthesia and prep, Burow's triangles were excised in the relaxed skin tension lines. The wound edges were widely undermined by dissection in the subcutaneous plane until adequate tissue mobility was obtained. Hemostasis was obtained. The wound edges were closed in a layered fashion using Vicryl and Fast Absorbing Plain Gut sutures. Postoperative length was 4.8 cm.   EBL minimal; complications none; wound care routine.  The patient was discharged in good condition and will return in one week for wound evaluation.    BENIGN LESIONS DISCUSSED WITH PATIENT:  I discussed the specifics of tumor, prognosis, and genetics of benign lesions.  I explained that treatment of these lesions would be purely cosmetic and not medically neccessary.  I discussed with patient different removal options including excision, cautery and /or laser.      Nature and genetics of benign skin lesions dicussed with patient.  Signs and Symptoms of skin cancer discussed with patient.  ABCDEs of melanoma reviewed with patient.  Patient encouraged to perform monthly skin exams.  UV precautions reviewed with patient.  Patient to follow up with Primary Care provider regarding elevated blood pressure.  Skin care regimen reviewed with patient: Eliminate harsh  soaps, i.e. Dial, zest, irsih spring; Mild soaps such as Cetaphil or Dove sensitive skin, avoid hot or cold showers, aggressive use of emollients including vanicream, cetaphil or cerave discussed with patient.    Risks of non-melanoma skin cancer discussed with patient   Return to clinic 3 months      CASTLE TESTING discussed with patient sent out today

## 2018-03-20 NOTE — NURSING NOTE
"Initial /73  Pulse 62  Ht 1.715 m (5' 7.5\") Estimated body mass index is 22.2 kg/(m^2) as calculated from the following:    Height as of 4/12/17: 1.727 m (5' 8\").    Weight as of 12/14/17: 66.2 kg (146 lb). .      "

## 2018-03-27 ENCOUNTER — ALLIED HEALTH/NURSE VISIT (OUTPATIENT)
Dept: DERMATOLOGY | Facility: CLINIC | Age: 68
End: 2018-03-27
Payer: MEDICARE

## 2018-03-27 DIAGNOSIS — Z48.01 ENCOUNTER FOR CHANGE OR REMOVAL OF SURGICAL WOUND DRESSING: Primary | ICD-10-CM

## 2018-03-27 PROCEDURE — 99207 ZZC NO CHARGE NURSE ONLY: CPT

## 2018-03-27 NOTE — NURSING NOTE
Pt returned to clinic for post surgery 1 week follow up bandage change. Pt has no complaints, denies pain. Bandage removed left shoulder, area cleansed with normal saline. Site is healing and wound edges approximating well. Reapplied new steri strips and paper tape.    Advised to watch for signs/sx of infection; spreading redness, drainage, odor, fever. Call or report promptly to clinic. Pt given written instructions and informed to rtc as needed. Patient verbalized understanding.     Leora Chun LPN.................3/27/2018

## 2018-03-27 NOTE — MR AVS SNAPSHOT
After Visit Summary   3/27/2018    Leanne Zuñiga    MRN: [id number]           Patient Information     Date Of Birth          1950        Visit Information        Provider Department      3/27/2018 1:15 PM Maru Patel [facility]        Care Instructions    WOUND CARE INSTRUCTIONS  for  ONE WEEK AFTER SURGERY      Left shoulder    1) Leave flat bandage on your skin for one week after today s bandage change.  2) In one week when you remove the bandage, you may resume your regular skin care routine, including washing with mild soap and water, applying moisturizer, make-up and sunscreen.    3) If there are any open or bleeding areas at the incision/graft site you should begin to cover the area with a bandage daily as follows:    1) Clean and dry the area with plain tap water using a Q-tip or sterile gauze pad.  2) Apply Polysporin or Bacitracin ointment to the open area.  3) Cover the wound with a band-aid or a sterile non-stick gauze pad and micropore paper tape.         SIGNS OF INFECTION  - If you notice any of these signs of infection, call your doctor right away: expanding redness around the wound.  - Yellow or greenish-colored pus or cloudy wound drainage.    - Red streaking spreading from the wound.  - Increased swelling, tenderness, or pain around the wound.   - Fever.    Please remember that yellow and clear drainage from a wound can be normal and related to normal wound healing.  Isolated drainage from a wound without a combination of the above features does not indicate infection.       *Once the bandages are removed, the scar will be red and firm (especially in the lip/chin area). This is normal and will fade in time. It might take 6-12 months for this to happen.     *Massaging the area will help the scar soften and fade quicker. Begin to massage the area one month after the bandages have been removed. To massage apply pressure directly and firmly over the scar with the  fingertips and move in a circular motion. Massage the area for a few minutes several times a day. Continue to massage the site for several months.    *Approximately 6-8 weeks after surgery it is not uncommon to see the formation of  tender pimple-like  bump along the scar. This is normal. As the scar continues to mature and the stitches underneath the skin begin to dissolve, this might occur. Do not pick or squeeze, this will resolve on it s own. Should one break open producing a small amount of drainage, apply Polysporin or Bacitracin ointment a few times a day until the wound is completely healed.    *Numbness in the surgical area is expected. It might take 12-18 months for the feeling to return to normal. During this time sensations of itchiness, tingling and occasional sharp pains might be noted. These feelings are normal and will subside once the nerves have completely healed.         IN CASE OF EMERGENCY: Dr Singh [phone]     If you were seen in [place] call: [phone]    If you were seen in [place] call: [phone]            Follow-ups after your visit        Your next 10 appointments already scheduled     Mar 27, 2018  1:15 PM CDT   [facility]urse Only with [facility]   [facility]    [address] [id number] [phone]            Jun 18, 2018 11:30 AM CDT   Return Visit with Misty Pro PA-C   [facility]    [address] [facility] [id number] [phone]              Who to contact     If you have questions or need follow up information about today's clinic visit or your schedule please contact [facility] directly at [phone].  Normal or non-critical lab and imaging results will be communicated to you by MyChart, letter or phone within 4 business days after the clinic has received the results. If you do not hear from us within 7 days, please contact the  "clinic through [facility] or phone. If you have a critical or abnormal lab result, we will notify you by phone as soon as possible.  Submit refill requests through GOOD or call your pharmacy and they will forward the refill request to us. Please allow 3 business days for your refill to be completed.          Additional Information About Your Visit        [place] Information     GOOD lets you send messages to your doctor, view your test results, renew your prescriptions, schedule appointments and more. To sign up, go to [email or web address] . Click on \"Log in\" on the left side of the screen, which will take you to the Welcome page. Then click on \"Sign up Now\" on the right side of the page.     You will be asked to enter the access code listed below, as well as some personal information. Please follow the directions to create your username and password.     Your access code is: [email or web address]  Expires: 2018 11:09 AM     Your access code will  in 90 days. If you need help or a new code, please call your [facility] clinic or [phone].        Care EveryWhere ID     This is your Care EveryWhere ID. This could be used by other organizations to access your [facility] medical records  [id number]         Blood Pressure from Last 3 Encounters:   18 116/73   18 125/77   17 130/72    Weight from Last 3 Encounters:   17 66.2 kg (146 lb)   10/11/17 67.6 kg (149 lb)   17 69.1 kg (152 lb 6.4 oz)              Today, you had the following     No orders found for display       Primary Care Provider Office Phone # Fax #    Cleo Henry [phone]       [facility] [address] [address]        Equal Access to Services     [facility] TAN : Nirav Rice, waanita luqadaha, qaybta kaalmada adriana, lissy mckeon. [facility] [phone].    ATENCIÓN: Si rickeyla español, tiene a arias disposición servicios gratuitos " de asistencia lingüística. Colt kuhn [phone].    We comply with applicable federal civil rights laws and [place] laws. We do not discriminate on the basis of race, color, national origin, age, disability, sex, sexual orientation, or gender identity.            Thank you!     Thank you for choosing [facility]  for your care. Our goal is always to provide you with excellent care. Hearing back from our patients is one way we can continue to improve our services. Please take a few minutes to complete the written survey that you may receive in the mail after your visit with us. Thank you!             Your Updated Medication List - Protect others around you: Learn how to safely use, store and throw away your medicines at www.disposemymeds.org.          This list is accurate as of 3/27/18  1:03 PM.  Always use your most recent med list.                   Brand Name Dispense Instructions for use Diagnosis    acetaminophen 325 MG tablet    TYLENOL    100 tablet    Take 2 tablets (650 mg) by mouth every 4 hours as needed for pain    RA (rheumatoid arthritis) (H)       ALOE VERA JUICE Liqd      2 oz twice daily    RA (rheumatoid arthritis) (H)       Alpha-Lipoic Acid 50 MG Caps      100-200 mg daily    RA (rheumatoid arthritis) (H)       ascorbic acid 500 MG tablet    VITAMIN C    30 tablet    Take 3 tablets (1,500 mg) by mouth daily    RA (rheumatoid arthritis) (H)       calcium-magnesium 500-250 MG Tabs per tablet    CALMAG     Take 1 tablet by mouth daily    RA (rheumatoid arthritis) (H)       diazepam 5 MG tablet    VALIUM    60 tablet    Take 1 tablet (5 mg) by mouth every 12 hours as needed for anxiety or sleep    Rheumatoid arthritis involving multiple sites, unspecified rheumatoid factor presence (H)       HYDROcodone-acetaminophen 5-325 MG per tablet    NORCO    240 tablet    Take 1-2 tablets by mouth every 6 hours as needed for moderate to severe pain or pain    Rash and nonspecific skin eruption        ibuprofen 600 MG tablet    ADVIL/MOTRIN    90 tablet    Take 1 tablet (600 mg) by mouth every 6 hours as needed for pain    RA (rheumatoid arthritis) (H)       Krill Oil 1000 MG Caps      Taking 2 daily.        niacin 500 MG tablet      Take 1 tablet (500 mg) by mouth daily (with breakfast) Takes 2000 mg daily    RA (rheumatoid arthritis) (H)       OVER-THE-COUNTER      Tumeric        * OVER-THE-COUNTER      Megastress B with vitamin C 2 tablets daily    RA (rheumatoid arthritis) (H)       * OVER-THE-COUNTER      Powdered barely grass 1 tablespoon daily    RA (rheumatoid arthritis) (H)       OVER-THE-COUNTER      Bio-Flex.    RA (rheumatoid arthritis) (H)       OVER-THE-COUNTER      Ashwagandha 400 mg daily    RA (rheumatoid arthritis) (H)       predniSONE 1 MG tablet    DELTASONE    120 tablet    Take 4 tablets (4 mg) by mouth daily    Rheumatoid arthritis of multiple sites without rheumatoid factor (H)       vitamin D 2000 UNITS tablet     100 tablet    Take 2,000 Units by mouth Twice weekly    RA (rheumatoid arthritis) (H)       * Notice:  This list has 2 medication(s) that are the same as other medications prescribed for you. Read the directions carefully, and ask your doctor or other care provider to review them with you.

## 2018-03-27 NOTE — PATIENT INSTRUCTIONS
WOUND CARE INSTRUCTIONS  for  ONE WEEK AFTER SURGERY      Left shoulder    1) Leave flat bandage on your skin for one week after today s bandage change.  2) In one week when you remove the bandage, you may resume your regular skin care routine, including washing with mild soap and water, applying moisturizer, make-up and sunscreen.    3) If there are any open or bleeding areas at the incision/graft site you should begin to cover the area with a bandage daily as follows:    1) Clean and dry the area with plain tap water using a Q-tip or sterile gauze pad.  2) Apply Polysporin or Bacitracin ointment to the open area.  3) Cover the wound with a band-aid or a sterile non-stick gauze pad and micropore paper tape.         SIGNS OF INFECTION  - If you notice any of these signs of infection, call your doctor right away: expanding redness around the wound.  - Yellow or greenish-colored pus or cloudy wound drainage.    - Red streaking spreading from the wound.  - Increased swelling, tenderness, or pain around the wound.   - Fever.    Please remember that yellow and clear drainage from a wound can be normal and related to normal wound healing.  Isolated drainage from a wound without a combination of the above features does not indicate infection.       *Once the bandages are removed, the scar will be red and firm (especially in the lip/chin area). This is normal and will fade in time. It might take 6-12 months for this to happen.     *Massaging the area will help the scar soften and fade quicker. Begin to massage the area one month after the bandages have been removed. To massage apply pressure directly and firmly over the scar with the fingertips and move in a circular motion. Massage the area for a few minutes several times a day. Continue to massage the site for several months.    *Approximately 6-8 weeks after surgery it is not uncommon to see the formation of  tender pimple-like  bump along the scar. This is normal. As  the scar continues to mature and the stitches underneath the skin begin to dissolve, this might occur. Do not pick or squeeze, this will resolve on it s own. Should one break open producing a small amount of drainage, apply Polysporin or Bacitracin ointment a few times a day until the wound is completely healed.    *Numbness in the surgical area is expected. It might take 12-18 months for the feeling to return to normal. During this time sensations of itchiness, tingling and occasional sharp pains might be noted. These feelings are normal and will subside once the nerves have completely healed.         IN CASE OF EMERGENCY: Dr Singh [phone]     If you were seen in [place] call: [phone]    If you were seen in [place] call: [phone]

## 2018-03-28 ENCOUNTER — TELEPHONE (OUTPATIENT)
Dept: DERMATOLOGY | Facility: CLINIC | Age: 68
End: 2018-03-28

## 2018-03-28 NOTE — TELEPHONE ENCOUNTER
Called and spoke to patient who would like [place] results and original biopsy sent to her via snail mail-[place] results not in yet.  CLEMENTE Hernandez-BSN  [facility]  [phone]

## 2018-03-28 NOTE — TELEPHONE ENCOUNTER
Reason for Call:  Other call back    Detailed comments: Pt calling to get DX test results and something in writing about the results.    Phone Number Patient can be reached at: Home number on file [phone] (home)    Best Time: any    Can we leave a detailed message on this number? no    Call taken on 3/28/2018 at 11:59 AM by Maine Espinoza

## 2018-04-02 LAB — LAB SCANNED RESULT: NORMAL

## 2018-04-02 NOTE — TELEPHONE ENCOUNTER
[facility] and sent to scan to chart. Copy mailed to patient's home address with Dr Singh's note:     Your melanoma test showed that your melanoma was a low risk Class 1 score in which the 5-year metastatic free survival rate was determined to be 97%.     Starla Nava RN

## 2018-04-10 ENCOUNTER — TELEPHONE (OUTPATIENT)
Dept: DERMATOLOGY | Facility: CLINIC | Age: 68
End: 2018-04-10

## 2018-04-10 NOTE — TELEPHONE ENCOUNTER
Reason for Call:  Other     Detailed comments: Pt was seen by new rheumatologist today, Dr. ABIMAEL Lawson (phone #: [phone]). She has taken her off the Orencia because it causes melanoma. Dr. Coffey wants clearance from Dr. Singh to start IV Rituxan. - Please advise    PHARMACY:  [facility]    Phone Number Patient can be reached at: Home number on file [phone] (home)    Best Time: Any    Can we leave a detailed message on this number? YES    Call taken on 4/10/2018 at 3:44 PM by Denise Behrendt

## 2018-04-11 NOTE — TELEPHONE ENCOUNTER
Please see note below and advise.  If appropriate please write a letter to Rheumatologist in regards to decision on Rituxan.   Delia FABIAN RN BSN PHN  [facility]

## 2018-06-18 ENCOUNTER — OFFICE VISIT (OUTPATIENT)
Dept: DERMATOLOGY | Facility: CLINIC | Age: 68
End: 2018-06-18
Payer: MEDICARE

## 2018-06-18 VITALS — HEART RATE: 69 BPM | SYSTOLIC BLOOD PRESSURE: 121 MMHG | OXYGEN SATURATION: 97 % | DIASTOLIC BLOOD PRESSURE: 71 MMHG

## 2018-06-18 DIAGNOSIS — L81.4 LENTIGO: ICD-10-CM

## 2018-06-18 DIAGNOSIS — D22.9 NEVUS: Primary | ICD-10-CM

## 2018-06-18 DIAGNOSIS — Z85.820 HISTORY OF MELANOMA: ICD-10-CM

## 2018-06-18 DIAGNOSIS — D18.00 ANGIOMA: ICD-10-CM

## 2018-06-18 DIAGNOSIS — Z80.8 FAMILY HISTORY OF MELANOMA: ICD-10-CM

## 2018-06-18 DIAGNOSIS — L82.1 SEBORRHEIC KERATOSIS: ICD-10-CM

## 2018-06-18 PROCEDURE — 99214 OFFICE O/P EST MOD 30 MIN: CPT | Performed by: PHYSICIAN ASSISTANT

## 2018-06-18 NOTE — NURSING NOTE
"Initial /71  Pulse 69  SpO2 97% Estimated body mass index is 22.2 kg/(m^2) as calculated from the following:    Height as of 4/12/17: 1.727 m (5' 8\").    Weight as of 12/14/17: 66.2 kg (146 lb). .      "

## 2018-06-18 NOTE — PROGRESS NOTES
HPI:   Leanne Zuñiga is a 67 year old female who presents for Full skin cancer screening.  chief complaint  Last Skin Exam: No     1st Baseline: 3 mo ago  Personal HX of Skin Cancer: Yes   Personal HX of Malignant Melanoma: Yes MIS on the left shoulder 2018  Family HX of Skin Cancer / Malignant Melanoma: yes mother with melanoma; dec'd from this. Ocular melanoma.   Personal HX of Atypical Moles:   no  Risk factors: sun exposure; some tanning bed use  New / Changing lesions:  Social History: lives near [place]. She recently stopped Orencia due to increased risk for malignancy. Her RA is flaring. Has an upcoming appointment with her rheumatologist.   On review of systems, there are no further skin complaints, patient is feeling otherwise well.  See patient intake sheet.  ROS of the following were done and are negative: Constitutional, Eyes, Ears, Nose,   Mouth, Throat, Cardiovascular, Respiratory, GI, Genitourinary, Musculoskeletal,   Psychiatric, Endocrine, Allergic/Immunologic.    PHYSICAL EXAM:   /71  Pulse 69  SpO2 97%  Skin exam performed as follows: Type 2 skin. Mood appropriate  Alert and Oriented X 3. Well developed, well nourished in no distress.  General appearance: Normal  Head including face: Normal  Eyes: conjunctiva and lids: Normal  Mouth: Lips, teeth, gums: Normal  Neck: Normal  Chest-breast/axillae: Normal  Back: Normal  Spleen and liver: Normal  Cardiovascular: Exam of peripheral vascular system by observation for swelling, varicosities, edema: Normal  Genitalia: groin, buttocks: Normal  Extremities: digits/nails (clubbing): Normal  Eccrine and Apocrine glands: Normal  Right upper extremity: Normal  Left upper extremity: Normal  Right lower extremity: Normal  Left lower extremity: Normal  Skin: Scalp and body hair: See below    Pt deferred exam of breasts, groin, buttocks: No    Other physical findings:  1. Multiple pigmented macules on extremities and trunk  2. Multiple pigmented  macules on face, trunk and extremities  3. Multiple vascular papules on trunk, arms and legs  4. Multiple scattered keratotic plaques       Except as noted above, no other signs of skin cancer or melanoma.     ASSESSMENT/PLAN:   Benign Full skin cancer screening today. . Patient with history of malignant melanoma 3/2018 depth 0.4 mm;  fhx of melanoma (ocular melanoma) in mother  Advised on monthly self exams and 3 months  Patient Education: Appropriate brochures given.    Multiple benign appearing nevi on arms, legs and trunk. Discussed ABCDEs of melanoma and sunscreen.   Multiple lentigos on arms, legs and trunk. Advised benign, no treatment needed.  Multiple scattered angiomas. Advised benign, no treatment needed.   Seborrheic keratosis on arms, legs and trunk. Advised benign, no treatment needed.  History of melanoma - continue Q 3 month FSE. No lymphadenopathy palpated today. Pt is doing this regularly at home.         Follow-up: Q 3 month FSE/PRN sooner    1.) Patient was asked about new and changing moles. YES  2.) Patient received a complete physical skin examination: YES  3.) Patient was counseled to perform a monthly self skin examination: YES  Scribed By: Misty Pro, MS, PA-C

## 2018-06-18 NOTE — MR AVS SNAPSHOT
After Visit Summary   6/18/2018    Leanne Zuñiga    MRN: [id number]           Patient Information     Date Of Birth          1950        Visit Information        Provider Department      6/18/2018 4:30 PM Misty Pro PA-C [facility]        Today's Diagnoses     Nevus    -  1    Lentigo        Angioma        Seborrheic keratosis        History of melanoma        Family history of melanoma           Follow-ups after your visit        Your next 10 appointments already scheduled     Sep 17, 2018 11:30 AM CDT   [facility] Misty Pro PA-C   [facility]    [address] [address]   [phone]              Who to contact     If you have questions or need follow up information about today's clinic visit or your schedule please contact [facility] directly at [phone].  Normal or non-critical lab and imaging results will be communicated to you by MyChart, letter or phone within 4 business days after the clinic has received the results. If you do not hear from us within 7 days, please contact the clinic through MyChart or phone. If you have a critical or abnormal lab result, we will notify you by phone as soon as possible.  Submit refill requests through [employer] or call your pharmacy and they will forward the refill request to us. Please allow 3 business days for your refill to be completed.          Additional Information About Your Visit        Care EveryWhere ID     This is your Care EveryWhere ID. This could be used by other organizations to access your [facility] medical records  [id number]        Your Vitals Were     Pulse Pulse Oximetry                69 97%           Blood Pressure from Last 3 Encounters:   06/18/18 121/71   03/20/18 116/73   02/22/18 125/77    Weight from Last 3 Encounters:   12/14/17 66.2 kg (146 lb)   10/11/17 67.6 kg (149 lb)   08/09/17 69.1 kg (152 lb 6.4 oz)               Today, you had the following     No orders found for display       Primary Care Provider Office Phone # Fax #    Cleo Henry [phone]       [facility] [address] [id number]        Equal Access to Services     LUIS ANGEL MADDOX : Hadii aad ku haddawitrajat Solinali, waaxda luqadaha, qaybta kaalmada adeoctavio, waxkaela luis armando natashamimi nicholsoncynthiaefrain mckeon. So [facility] [phone].    ATENCIÓN: Si habla español, tiene a arias disposición servicios gratuitos de asistencia lingüística. Llame al [phone].    We comply with applicable federal civil rights laws and [place] laws. We do not discriminate on the basis of race, color, national origin, age, disability, sex, sexual orientation, or gender identity.            Thank you!     Thank you for choosing [facility]  for your care. Our goal is always to provide you with excellent care. Hearing back from our patients is one way we can continue to improve our services. Please take a few minutes to complete the written survey that you may receive in the mail after your visit with us. Thank you!             Your Updated Medication List - Protect others around you: Learn how to safely use, store and throw away your medicines at www.disposemymeds.org.          This list is accurate as of 6/18/18  4:55 PM.  Always use your most recent med list.                   Brand Name Dispense Instructions for use Diagnosis    acetaminophen 325 MG tablet    TYLENOL    100 tablet    Take 2 tablets (650 mg) by mouth every 4 hours as needed for pain    RA (rheumatoid arthritis) (H)       ALOE VERA JUICE Liqd      2 oz twice daily    RA (rheumatoid arthritis) (H)       Alpha-Lipoic Acid 50 MG Caps      100-200 mg daily    RA (rheumatoid arthritis) (H)       ascorbic acid 500 MG tablet    VITAMIN C    30 tablet    Take 3 tablets (1,500 mg) by mouth daily    RA (rheumatoid arthritis) (H)       calcium-magnesium 500-250 MG Tabs per tablet     CALMAG     Take 1 tablet by mouth daily    RA (rheumatoid arthritis) (H)       diazepam 5 MG tablet    VALIUM    60 tablet    Take 1 tablet (5 mg) by mouth every 12 hours as needed for anxiety or sleep    Rheumatoid arthritis involving multiple sites, unspecified rheumatoid factor presence (H)       HYDROcodone-acetaminophen 5-325 MG per tablet    NORCO    240 tablet    Take 1-2 tablets by mouth every 6 hours as needed for moderate to severe pain or pain    Rash and nonspecific skin eruption       ibuprofen 600 MG tablet    ADVIL/MOTRIN    90 tablet    Take 1 tablet (600 mg) by mouth every 6 hours as needed for pain    RA (rheumatoid arthritis) (H)       Krill Oil 1000 MG Caps      Taking 2 daily.        niacin 500 MG tablet      Take 1 tablet (500 mg) by mouth daily (with breakfast) Takes 2000 mg daily    RA (rheumatoid arthritis) (H)       OVER-THE-COUNTER      Tumeric        * OVER-THE-COUNTER      Megastress B with vitamin C 2 tablets daily    RA (rheumatoid arthritis) (H)       * OVER-THE-COUNTER      Powdered barely grass 1 tablespoon daily    RA (rheumatoid arthritis) (H)       OVER-THE-COUNTER      Bio-Flex.    RA (rheumatoid arthritis) (H)       OVER-THE-COUNTER      Ashwagandha 400 mg daily    RA (rheumatoid arthritis) (H)       predniSONE 1 MG tablet    DELTASONE    120 tablet    Take 4 tablets (4 mg) by mouth daily    Rheumatoid arthritis of multiple sites without rheumatoid factor (H)       vitamin D 2000 units tablet     100 tablet    Take 2,000 Units by mouth Twice weekly    RA (rheumatoid arthritis) (H)       * Notice:  This list has 2 medication(s) that are the same as other medications prescribed for you. Read the directions carefully, and ask your doctor or other care provider to review them with you.

## 2018-06-18 NOTE — LETTER
6/18/2018         RE: Leanne Zuñiga  [address] [address]        Dear Colleague,    Thank you for referring your patient, Leanne Zuñiga, to the [facility]. Please see a copy of my visit note below.    HPI:   Leanne Zuñiga is a 67 year old female who presents for Full skin cancer screening.  chief complaint  Last Skin Exam: No     1st Baseline: 3 mo ago  Personal HX of Skin Cancer: Yes   Personal HX of Malignant Melanoma: Yes MIS on the left shoulder 2018  Family HX of Skin Cancer / Malignant Melanoma: yes mother with melanoma; dec'd from this. Ocular melanoma.   Personal HX of Atypical Moles:   no  Risk factors: sun exposure; some tanning bed use  New / Changing lesions:  Social History: lives near [place]. She recently stopped Orencia due to increased risk for malignancy. Her RA is flaring. Has an upcoming appointment with her rheumatologist.   On review of systems, there are no further skin complaints, patient is feeling otherwise well.  See patient intake sheet.  ROS of the following were done and are negative: Constitutional, Eyes, Ears, Nose,   Mouth, Throat, Cardiovascular, Respiratory, GI, Genitourinary, Musculoskeletal,   Psychiatric, Endocrine, Allergic/Immunologic.    PHYSICAL EXAM:   /71  Pulse 69  SpO2 97%  Skin exam performed as follows: Type 2 skin. Mood appropriate  Alert and Oriented X 3. Well developed, well nourished in no distress.  General appearance: Normal  Head including face: Normal  Eyes: conjunctiva and lids: Normal  Mouth: Lips, teeth, gums: Normal  Neck: Normal  Chest-breast/axillae: Normal  Back: Normal  Spleen and liver: Normal  Cardiovascular: Exam of peripheral vascular system by observation for swelling, varicosities, edema: Normal  Genitalia: groin, buttocks: Normal  Extremities: digits/nails (clubbing): Normal  Eccrine and Apocrine glands: Normal  Right upper extremity: Normal  Left upper extremity: Normal  Right lower  extremity: Normal  Left lower extremity: Normal  Skin: Scalp and body hair: See below    Pt deferred exam of breasts, groin, buttocks: No    Other physical findings:  1. Multiple pigmented macules on extremities and trunk  2. Multiple pigmented macules on face, trunk and extremities  3. Multiple vascular papules on trunk, arms and legs  4. Multiple scattered keratotic plaques       Except as noted above, no other signs of skin cancer or melanoma.     ASSESSMENT/PLAN:   Benign Full skin cancer screening today. . Patient with history of malignant melanoma 3/2018 depth 0.4 mm;  fhx of melanoma (ocular melanoma) in mother  Advised on monthly self exams and 3 months  Patient Education: Appropriate brochures given.    Multiple benign appearing nevi on arms, legs and trunk. Discussed ABCDEs of melanoma and sunscreen.   Multiple lentigos on arms, legs and trunk. Advised benign, no treatment needed.  Multiple scattered angiomas. Advised benign, no treatment needed.   Seborrheic keratosis on arms, legs and trunk. Advised benign, no treatment needed.  History of melanoma - continue Q 3 month FSE. No lymphadenopathy palpated today. Pt is doing this regularly at home.         Follow-up: Q 3 month FSE/PRN sooner    1.) Patient was asked about new and changing moles. YES  2.) Patient received a complete physical skin examination: YES  3.) Patient was counseled to perform a monthly self skin examination: YES  Scribed By: Misty Pro, MS, PARADHA      Again, thank you for allowing me to participate in the care of your patient.        Sincerely,        Misty Pro PA-C

## 2018-09-17 ENCOUNTER — OFFICE VISIT (OUTPATIENT)
Dept: DERMATOLOGY | Facility: CLINIC | Age: 68
End: 2018-09-17
Payer: MEDICARE

## 2018-09-17 ENCOUNTER — TELEPHONE (OUTPATIENT)
Dept: DERMATOLOGY | Facility: CLINIC | Age: 68
End: 2018-09-17

## 2018-09-17 VITALS — OXYGEN SATURATION: 99 % | SYSTOLIC BLOOD PRESSURE: 119 MMHG | HEART RATE: 62 BPM | DIASTOLIC BLOOD PRESSURE: 79 MMHG

## 2018-09-17 DIAGNOSIS — Z85.820 HISTORY OF MELANOMA: ICD-10-CM

## 2018-09-17 DIAGNOSIS — D18.00 ANGIOMA: ICD-10-CM

## 2018-09-17 DIAGNOSIS — L81.4 LENTIGO: ICD-10-CM

## 2018-09-17 DIAGNOSIS — Z80.8 FAMILY HISTORY OF MELANOMA: ICD-10-CM

## 2018-09-17 DIAGNOSIS — D22.9 NEVUS: ICD-10-CM

## 2018-09-17 DIAGNOSIS — C44.311 BASAL CELL CARCINOMA OF LEFT ALA NASI: Primary | ICD-10-CM

## 2018-09-17 DIAGNOSIS — D48.5 NEOPLASM OF UNCERTAIN BEHAVIOR OF SKIN: Primary | ICD-10-CM

## 2018-09-17 DIAGNOSIS — L82.1 SEBORRHEIC KERATOSIS: ICD-10-CM

## 2018-09-17 PROCEDURE — 99214 OFFICE O/P EST MOD 30 MIN: CPT | Mod: 25 | Performed by: PHYSICIAN ASSISTANT

## 2018-09-17 PROCEDURE — 88331 PATH CONSLTJ SURG 1 BLK 1SPC: CPT | Performed by: DERMATOLOGY

## 2018-09-17 PROCEDURE — 11100 HC BIOPSY SKIN/SUBQ/MUC MEM, SINGLE LESION: CPT | Performed by: PHYSICIAN ASSISTANT

## 2018-09-17 RX ORDER — ALBUTEROL SULFATE 90 UG/1
2 AEROSOL, METERED RESPIRATORY (INHALATION)
Status: ON HOLD | COMMUNITY
Start: 2017-12-05 | End: 2023-04-26

## 2018-09-17 RX ORDER — AMOXICILLIN 500 MG
2 CAPSULE ORAL
Status: ON HOLD | COMMUNITY
Start: 2018-01-23 | End: 2023-04-26

## 2018-09-17 NOTE — LETTER
9/17/2018         RE: Leanne Zuñiga  [address] [address] [address]        Dear Colleague,    Thank you for referring your patient, Leanne Zuñiga, to the [facility]. Please see a copy of my visit note below.    HPI:   Leanne Zuñiga is a 67 year old female who presents for Full skin cancer screening.  chief complaint  Last Skin Exam: No     1st Baseline: 3 mo ago  Personal HX of Skin Cancer: Yes   Personal HX of Malignant Melanoma: Yes MIS on the left shoulder 2018  Family HX of Skin Cancer / Malignant Melanoma: yes mother with melanoma; dec'd from this. Ocular melanoma.   Personal HX of Atypical Moles:   no  Risk factors: sun exposure; some tanning bed use  New / Changing lesions: none  Social History: lives near [place]. She recently stopped Orencia due to increased risk for malignancy. Her RA is flaring. Taking prednisone intermittently.   On review of systems, there are no further skin complaints, patient is feeling otherwise well.  See patient intake sheet.  ROS of the following were done and are negative: Constitutional, Eyes, Ears, Nose,   Mouth, Throat, Cardiovascular, Respiratory, GI, Genitourinary, Musculoskeletal,   Psychiatric, Endocrine, Allergic/Immunologic.    PHYSICAL EXAM:   /79  Pulse 62  SpO2 99%  Skin exam performed as follows: Type 2 skin. Mood appropriate  Alert and Oriented X 3. Well developed, well nourished in no distress.  General appearance: Normal  Head including face: Normal  Eyes: conjunctiva and lids: Normal  Mouth: Lips, teeth, gums: Normal  Neck: Normal  Chest-breast/axillae: Normal  Back: Normal  Spleen and liver: Normal  Cardiovascular: Exam of peripheral vascular system by observation for swelling, varicosities, edema: Normal  Genitalia: groin, buttocks: Normal  Extremities: digits/nails (clubbing): Normal  Eccrine and Apocrine glands: Normal  Right upper extremity: Normal  Left upper extremity: Normal  Right lower extremity:  Normal  Left lower extremity: Normal  Skin: Scalp and body hair: See below    Pt deferred exam of breasts, groin, buttocks: No    Other physical findings:  1. Multiple pigmented macules on extremities and trunk  2. Multiple pigmented macules on face, trunk and extremities  3. Multiple vascular papules on trunk, arms and legs  4. Multiple scattered keratotic plaques  5. 5 mm pink pearly papule on the left ala     Except as noted above, no other signs of skin cancer or melanoma.     ASSESSMENT/PLAN:   Benign Full skin cancer screening today. . Patient with history of malignant melanoma 3/2018 depth 0.4 mm;  fhx of melanoma (ocular melanoma) in mother  Advised on monthly self exams and 3 months  Patient Education: Appropriate brochures given.    Multiple benign appearing nevi on arms, legs and trunk. Discussed ABCDEs of melanoma and sunscreen.   Multiple lentigos on arms, legs and trunk. Advised benign, no treatment needed.  Multiple scattered angiomas. Advised benign, no treatment needed.   Seborrheic keratosis on arms, legs and trunk. Advised benign, no treatment needed.  R/o BCC on the left ala. Shave bx in typical fashion .  Area cleaned with betadyne and anesthetized with 1% lidocaine with epi .  Dermablade used to remove the lesion and sent to pathology. Bleeding was cauterized. Pt tolerated procedure well.  History of melanoma - continue Q 3 month FSE. No lymphadenopathy palpated today. Pt is doing this regularly at home.         Follow-up: Q 3 month FSE/PRN sooner    1.) Patient was asked about new and changing moles. YES  2.) Patient received a complete physical skin examination: YES  3.) Patient was counseled to perform a monthly self skin examination: YES  Scribed By: Misty Pro, MS, PARADHA    Again, thank you for allowing me to participate in the care of your patient.        Sincerely,        Misty Pro PA-C

## 2018-09-17 NOTE — LETTER
9/17/2018         RE: Leanne Zuñiga  [address] [address]        Dear Colleague,    Thank you for referring your patient, Leanne Zuñiga, to the [facility]. Please see a copy of my visit note below.    L ala:.Orthokeratosis of epidermis with a proliferation of nests of basaloid cells, with peripheral palisading and a haphazard arrangement in the center extending into the dermis, forming nodules.  The tumor cells have hyperchromatic nuclei. Poor cytoplasm and intercellular bridging.      L ala basal cell carcinoma schedule excision     Again, thank you for allowing me to participate in the care of your patient.        Sincerely,        Kavin Singh MD

## 2018-09-17 NOTE — PROGRESS NOTES
L ala:.Orthokeratosis of epidermis with a proliferation of nests of basaloid cells, with peripheral palisading and a haphazard arrangement in the center extending into the dermis, forming nodules.  The tumor cells have hyperchromatic nuclei. Poor cytoplasm and intercellular bridging.      L ala basal cell carcinoma schedule excision

## 2018-09-17 NOTE — TELEPHONE ENCOUNTER
----- Message from Kavin Singh MD sent at 9/17/2018  2:34 PM CDT -----  L ala basal cell carcinoma schedule excision

## 2018-09-17 NOTE — MR AVS SNAPSHOT
"              After Visit Summary   9/17/2018    Leanne Zuñiga    MRN: [id number]           Patient Information     Date Of Birth          1950        Visit Information        Provider Department      9/17/2018 1:00 PM Kavin Singh MD [facility]        Today's Diagnoses     Basal cell carcinoma of left ala nasi    -  1       Follow-ups after your visit        Your next 10 appointments already scheduled     Dec 17, 2018 10:30 AM CST   Return Visit with Misty Pro PA-C   [facility]    [address] [address]   [phone]              Who to contact     If you have questions or need follow up information about today's clinic visit or your schedule please contact [facility] directly at [phone].  Normal or non-critical lab and imaging results will be communicated to you by MyChart, letter or phone within 4 business days after the clinic has received the results. If you do not hear from us within 7 days, please contact the clinic through MyChart or phone. If you have a critical or abnormal lab result, we will notify you by phone as soon as possible.  Submit refill requests through fotobabble or call your pharmacy and they will forward the refill request to us. Please allow 3 business days for your refill to be completed.          Additional Information About Your Visit        MyChart Information     fotobabble lets you send messages to your doctor, view your test results, renew your prescriptions, schedule appointments and more. To sign up, go to [email or web address] . Click on \"Log in\" on the left side of the screen, which will take you to the Welcome page. Then click on \"Sign up Now\" on the right side of the page.     You will be asked to enter the access code listed below, as well as some personal information. Please follow the directions to create your username and password.     Your access code " is: [email or web address]  Expires: 2018 11:57 AM     Your access code will  in 90 days. If you need help or a new code, please call your [facility] clinic or [phone].        Care EveryWhere ID     This is your Care EveryWhere ID. This could be used by other organizations to access your [facility] medical records  [id number]         Blood Pressure from Last 3 Encounters:   18 119/79   18 121/71   18 116/73    Weight from Last 3 Encounters:   17 66.2 kg (146 lb)   10/11/17 67.6 kg (149 lb)   17 69.1 kg (152 lb 6.4 oz)              We Performed the Following     CL FROZEN SECTION FIRST SPEC        Primary Care Provider Office Phone # Fax #    Cleo Henry [phone]       [facility] [address] [address]        Equal Access to Services     LUIS ANGEL MADDOX : Hadii aad ku hadasho Soomaali, waaxda luqadaha, qaybta kaalmada adeegyada, waxay idiin hayaan jack enrique . So [facility] [phone].    ATENCIÓN: Si habla español, tiene a arias disposición servicios gratuitos de asistencia lingüística. Llame al [phone].    We comply with applicable federal civil rights laws and [place] laws. We do not discriminate on the basis of race, color, national origin, age, disability, sex, sexual orientation, or gender identity.            Thank you!     Thank you for choosing [facility]  for your care. Our goal is always to provide you with excellent care. Hearing back from our patients is one way we can continue to improve our services. Please take a few minutes to complete the written survey that you may receive in the mail after your visit with us. Thank you!             Your Updated Medication List - Protect others around you: Learn how to safely use, store and throw away your medicines at www.disposemymeds.org.          This list is accurate as of 18  2:34 PM.  Always use your most recent med list.                   Brand Name  Dispense Instructions for use Diagnosis    acetaminophen 325 MG tablet    TYLENOL    100 tablet    Take 2 tablets (650 mg) by mouth every 4 hours as needed for pain    RA (rheumatoid arthritis) (H)       Alpha-Lipoic Acid 50 MG Caps      100-200 mg daily    RA (rheumatoid arthritis) (H)       ascorbic acid 500 MG tablet    VITAMIN C    30 tablet    Take 3 tablets (1,500 mg) by mouth daily    RA (rheumatoid arthritis) (H)       calcium-magnesium 500-250 MG Tabs per tablet    CALMAG     Take 1 tablet by mouth daily    RA (rheumatoid arthritis) (H)       diazepam 5 MG tablet    VALIUM    60 tablet    Take 1 tablet (5 mg) by mouth every 12 hours as needed for anxiety or sleep    Rheumatoid arthritis involving multiple sites, unspecified rheumatoid factor presence (H)       HYDROcodone-acetaminophen 5-325 MG per tablet    NORCO    240 tablet    Take 1-2 tablets by mouth every 6 hours as needed for moderate to severe pain or pain    Rash and nonspecific skin eruption       ibuprofen 600 MG tablet    ADVIL/MOTRIN    90 tablet    Take 1 tablet (600 mg) by mouth every 6 hours as needed for pain    RA (rheumatoid arthritis) (H)       Krill Oil 1000 MG Caps      Taking 2 daily.        L-Tryptophan 500 MG Caps      Take 2 capsules by mouth        MISC NATURAL PRODUCTS PO      Curamin        niacin 500 MG tablet      Take 1 tablet (500 mg) by mouth daily (with breakfast) Takes 2000 mg daily    RA (rheumatoid arthritis) (H)       OVER-THE-COUNTER      Tumeric        * OVER-THE-COUNTER      Megastress B with vitamin C 2 tablets daily    RA (rheumatoid arthritis) (H)       * OVER-THE-COUNTER      Powdered barely grass 1 tablespoon daily    RA (rheumatoid arthritis) (H)       OVER-THE-COUNTER      Bio-Flex.    RA (rheumatoid arthritis) (H)       OVER-THE-COUNTER      Ashwagandha 400 mg daily    RA (rheumatoid arthritis) (H)       predniSONE 1 MG tablet    DELTASONE    120 tablet    Take 4 tablets (4 mg) by mouth daily    Rheumatoid  arthritis of multiple sites without rheumatoid factor (H)       VENTOLIN  (90 Base) MCG/ACT inhaler   Generic drug:  albuterol      Inhale 2 puffs into the lungs        vitamin D 2000 units tablet     100 tablet    Take 2,000 Units by mouth Twice weekly    RA (rheumatoid arthritis) (H)       * Notice:  This list has 2 medication(s) that are the same as other medications prescribed for you. Read the directions carefully, and ask your doctor or other care provider to review them with you.

## 2018-09-17 NOTE — PROGRESS NOTES
HPI:   Leanne Zuñiga is a 67 year old female who presents for Full skin cancer screening.  chief complaint  Last Skin Exam: No     1st Baseline: 3 mo ago  Personal HX of Skin Cancer: Yes   Personal HX of Malignant Melanoma: Yes MIS on the left shoulder 2018  Family HX of Skin Cancer / Malignant Melanoma: yes mother with melanoma; dec'd from this. Ocular melanoma.   Personal HX of Atypical Moles:   no  Risk factors: sun exposure; some tanning bed use  New / Changing lesions: none  Social History: lives near [place]. She recently stopped Orencia due to increased risk for malignancy. Her RA is flaring. Taking prednisone intermittently.   On review of systems, there are no further skin complaints, patient is feeling otherwise well.  See patient intake sheet.  ROS of the following were done and are negative: Constitutional, Eyes, Ears, Nose,   Mouth, Throat, Cardiovascular, Respiratory, GI, Genitourinary, Musculoskeletal,   Psychiatric, Endocrine, Allergic/Immunologic.    PHYSICAL EXAM:   /79  Pulse 62  SpO2 99%  Skin exam performed as follows: Type 2 skin. Mood appropriate  Alert and Oriented X 3. Well developed, well nourished in no distress.  General appearance: Normal  Head including face: Normal  Eyes: conjunctiva and lids: Normal  Mouth: Lips, teeth, gums: Normal  Neck: Normal  Chest-breast/axillae: Normal  Back: Normal  Spleen and liver: Normal  Cardiovascular: Exam of peripheral vascular system by observation for swelling, varicosities, edema: Normal  Genitalia: groin, buttocks: Normal  Extremities: digits/nails (clubbing): Normal  Eccrine and Apocrine glands: Normal  Right upper extremity: Normal  Left upper extremity: Normal  Right lower extremity: Normal  Left lower extremity: Normal  Skin: Scalp and body hair: See below    Pt deferred exam of breasts, groin, buttocks: No    Other physical findings:  1. Multiple pigmented macules on extremities and trunk  2. Multiple pigmented macules on face,  trunk and extremities  3. Multiple vascular papules on trunk, arms and legs  4. Multiple scattered keratotic plaques  5. 5 mm pink pearly papule on the left ala     Except as noted above, no other signs of skin cancer or melanoma.     ASSESSMENT/PLAN:   Benign Full skin cancer screening today. . Patient with history of malignant melanoma 3/2018 depth 0.4 mm;  fhx of melanoma (ocular melanoma) in mother  Advised on monthly self exams and 3 months  Patient Education: Appropriate brochures given.    Multiple benign appearing nevi on arms, legs and trunk. Discussed ABCDEs of melanoma and sunscreen.   Multiple lentigos on arms, legs and trunk. Advised benign, no treatment needed.  Multiple scattered angiomas. Advised benign, no treatment needed.   Seborrheic keratosis on arms, legs and trunk. Advised benign, no treatment needed.  R/o BCC on the left ala. Shave bx in typical fashion .  Area cleaned with betadyne and anesthetized with 1% lidocaine with epi .  Dermablade used to remove the lesion and sent to pathology. Bleeding was cauterized. Pt tolerated procedure well.  History of melanoma - continue Q 3 month FSE. No lymphadenopathy palpated today. Pt is doing this regularly at home.         Follow-up: Q 3 month FSE/PRN sooner    1.) Patient was asked about new and changing moles. YES  2.) Patient received a complete physical skin examination: YES  3.) Patient was counseled to perform a monthly self skin examination: YES  Scribed By: Misty Pro, MS, PARADHA

## 2018-09-17 NOTE — MR AVS SNAPSHOT
After Visit Summary   9/17/2018    Leanne Zuñiga    MRN: [id number]           Patient Information     Date Of Birth          1950        Visit Information        Provider Department      9/17/2018 11:30 AM Misty Pro PA-C [facility]        Today's Diagnoses     Neoplasm of uncertain behavior of skin    -  1    Nevus        Lentigo        Angioma        Seborrheic keratosis        History of melanoma        Family history of melanoma          Care Instructions          Wound Care Instructions     FOR SUPERFICIAL WOUNDS     [facility] [phone]    [facility] [phone]  Left side of nose                       AFTER 24 HOURS YOU SHOULD REMOVE THE BANDAGE AND BEGIN DAILY DRESSING CHANGES AS FOLLOWS:     1) Remove Dressing.     2) Clean and dry the area with tap water using a Q-tip or sterile gauze pad.     3) Apply Vaseline, Aquaphor, Polysporin ointment or Bacitracin ointment over entire wound.  Do NOT use Neosporin ointment.     4) Cover the wound with a band-aid, or a sterile non-stick gauze pad and micropore paper tape      REPEAT THESE INSTRUCTIONS AT LEAST ONCE A DAY UNTIL THE WOUND HAS COMPLETELY HEALED.    It is an old wives tale that a wound heals better when it is exposed to air and allowed to dry out. The wound will heal faster with a better cosmetic result if it is kept moist with ointment and covered with a bandage.    **Do not let the wound dry out.**      Supplies Needed:      *Cotton tipped applicators (Q-tips)    *Polysporin Ointment or Bacitracin Ointment (NOT NEOSPORIN)    *Band-aids or non-stick gauze pads and micropore paper tape.      PATIENT INFORMATION:    During the healing process you will notice a number of changes. All wounds develop a small halo of redness surrounding the wound.  This means healing is occurring. Severe itching with extensive redness usually indicates sensitivity to the ointment or bandage tape used  to dress the wound.  You should call our office if this develops.      Swelling  and/or discoloration around your surgical site is common, particularly when performed around the eye.    All wounds normally drain.  The larger the wound the more drainage there will be.  After 7-10 days, you will notice the wound beginning to shrink and new skin will begin to grow.  The wound is healed when you can see skin has formed over the entire area.  A healed wound has a healthy, shiny look to the surface and is red to dark pink in color to normalize.  Wounds may take approximately 4-6 weeks to heal.  Larger wounds may take 6-8 weeks.  After the wound is healed you may discontinue dressing changes.    You may experience a sensation of tightness as your wound heals. This is normal and will gradually subside.    Your healed wound may be sensitive to temperature changes. This sensitivity improves with time, but if you re having a lot of discomfort, try to avoid temperature extremes.    Patients frequently experience itching after their wound appears to have healed because of the continue healing under the skin.  Plain Vaseline will help relieve the itching.        POSSIBLE COMPLICATIONS    BLEEDIN. Leave the bandage in place.  2. Use tightly rolled up gauze or a cloth to apply direct pressure over the bandage for 30  minutes.  3. Reapply pressure for an additional 30 minutes if necessary  4. Use additional gauze and tape to maintain pressure once the bleeding has stopped.            Follow-ups after your visit        Who to contact     If you have questions or need follow up information about today's clinic visit or your schedule please contact [facility] directly at [phone].  Normal or non-critical lab and imaging results will be communicated to you by MyChart, letter or phone within 4 business days after the clinic has received the results. If you do not hear from us within 7 days, please contact the  "clinic through [employer] or phone. If you have a critical or abnormal lab result, we will notify you by phone as soon as possible.  Submit refill requests through [employer] or call your pharmacy and they will forward the refill request to us. Please allow 3 business days for your refill to be completed.          Additional Information About Your Visit        [employer] Information     [employer] lets you send messages to your doctor, view your test results, renew your prescriptions, schedule appointments and more. To sign up, go to [email or web address] . Click on \"Log in\" on the left side of the screen, which will take you to the Welcome page. Then click on \"Sign up Now\" on the right side of the page.     You will be asked to enter the access code listed below, as well as some personal information. Please follow the directions to create your username and password.     Your access code is: [email or web address]  Expires: 2018 11:57 AM     Your access code will  in 90 days. If you need help or a new code, please call your [facility] clinic or [phone].        Care EveryWhere ID     This is your Care EveryWhere ID. This could be used by other organizations to access your [facility] medical records  [id number]        Your Vitals Were     Pulse Pulse Oximetry                62 99%           Blood Pressure from Last 3 Encounters:   18 119/79   18 121/71   18 116/73    Weight from Last 3 Encounters:   17 66.2 kg (146 lb)   10/11/17 67.6 kg (149 lb)   17 69.1 kg (152 lb 6.4 oz)              We Performed the Following     BIOPSY SKIN/SUBQ/MUC MEM, SINGLE LESION        Primary Care Provider Office Phone # Fax #    Cleo Henry [phone]       [facility] [address] [facility] [address]        Equal Access to Services     LUIS ANGEL MADDOX : Nirav Rice, waaxda luqadaha, qaybta kaallissy mistry . So [facility] " [phone].    ATENCIÓN: Si kimberly espinoza, tiene a arias disposición servicios gratuitos de asistencia lingüística. Colt kuhn [phone].    We comply with applicable federal civil rights laws and [place] laws. We do not discriminate on the basis of race, color, national origin, age, disability, sex, sexual orientation, or gender identity.            Thank you!     Thank you for choosing [facility]  for your care. Our goal is always to provide you with excellent care. Hearing back from our patients is one way we can continue to improve our services. Please take a few minutes to complete the written survey that you may receive in the mail after your visit with us. Thank you!             Your Updated Medication List - Protect others around you: Learn how to safely use, store and throw away your medicines at www.disposemymeds.org.          This list is accurate as of 9/17/18 12:01 PM.  Always use your most recent med list.                   Brand Name Dispense Instructions for use Diagnosis    acetaminophen 325 MG tablet    TYLENOL    100 tablet    Take 2 tablets (650 mg) by mouth every 4 hours as needed for pain    RA (rheumatoid arthritis) (H)       Alpha-Lipoic Acid 50 MG Caps      100-200 mg daily    RA (rheumatoid arthritis) (H)       ascorbic acid 500 MG tablet    VITAMIN C    30 tablet    Take 3 tablets (1,500 mg) by mouth daily    RA (rheumatoid arthritis) (H)       calcium-magnesium 500-250 MG Tabs per tablet    CALMAG     Take 1 tablet by mouth daily    RA (rheumatoid arthritis) (H)       diazepam 5 MG tablet    VALIUM    60 tablet    Take 1 tablet (5 mg) by mouth every 12 hours as needed for anxiety or sleep    Rheumatoid arthritis involving multiple sites, unspecified rheumatoid factor presence (H)       HYDROcodone-acetaminophen 5-325 MG per tablet    NORCO    240 tablet    Take 1-2 tablets by mouth every 6 hours as needed for moderate to severe pain or pain    Rash and nonspecific skin  eruption       ibuprofen 600 MG tablet    ADVIL/MOTRIN    90 tablet    Take 1 tablet (600 mg) by mouth every 6 hours as needed for pain    RA (rheumatoid arthritis) (H)       Krill Oil 1000 MG Caps      Taking 2 daily.        L-Tryptophan 500 MG Caps      Take 2 capsules by mouth        MISC NATURAL PRODUCTS PO      Curamin        niacin 500 MG tablet      Take 1 tablet (500 mg) by mouth daily (with breakfast) Takes 2000 mg daily    RA (rheumatoid arthritis) (H)       OVER-THE-COUNTER      Tumeric        * OVER-THE-COUNTER      Megastress B with vitamin C 2 tablets daily    RA (rheumatoid arthritis) (H)       * OVER-THE-COUNTER      Powdered barely grass 1 tablespoon daily    RA (rheumatoid arthritis) (H)       OVER-THE-COUNTER      Bio-Flex.    RA (rheumatoid arthritis) (H)       OVER-THE-COUNTER      Ashwagandha 400 mg daily    RA (rheumatoid arthritis) (H)       predniSONE 1 MG tablet    DELTASONE    120 tablet    Take 4 tablets (4 mg) by mouth daily    Rheumatoid arthritis of multiple sites without rheumatoid factor (H)       VENTOLIN  (90 Base) MCG/ACT inhaler   Generic drug:  albuterol      Inhale 2 puffs into the lungs        vitamin D 2000 units tablet     100 tablet    Take 2,000 Units by mouth Twice weekly    RA (rheumatoid arthritis) (H)       * Notice:  This list has 2 medication(s) that are the same as other medications prescribed for you. Read the directions carefully, and ask your doctor or other care provider to review them with you.

## 2018-09-17 NOTE — PATIENT INSTRUCTIONS
Wound Care Instructions     FOR SUPERFICIAL WOUNDS     [facility] [phone]    [facility] [phone]  Left side of nose                       AFTER 24 HOURS YOU SHOULD REMOVE THE BANDAGE AND BEGIN DAILY DRESSING CHANGES AS FOLLOWS:     1) Remove Dressing.     2) Clean and dry the area with tap water using a Q-tip or sterile gauze pad.     3) Apply Vaseline, Aquaphor, Polysporin ointment or Bacitracin ointment over entire wound.  Do NOT use Neosporin ointment.     4) Cover the wound with a band-aid, or a sterile non-stick gauze pad and micropore paper tape      REPEAT THESE INSTRUCTIONS AT LEAST ONCE A DAY UNTIL THE WOUND HAS COMPLETELY HEALED.    It is an old wives tale that a wound heals better when it is exposed to air and allowed to dry out. The wound will heal faster with a better cosmetic result if it is kept moist with ointment and covered with a bandage.    **Do not let the wound dry out.**      Supplies Needed:      *Cotton tipped applicators (Q-tips)    *Polysporin Ointment or Bacitracin Ointment (NOT NEOSPORIN)    *Band-aids or non-stick gauze pads and micropore paper tape.      PATIENT INFORMATION:    During the healing process you will notice a number of changes. All wounds develop a small halo of redness surrounding the wound.  This means healing is occurring. Severe itching with extensive redness usually indicates sensitivity to the ointment or bandage tape used to dress the wound.  You should call our office if this develops.      Swelling  and/or discoloration around your surgical site is common, particularly when performed around the eye.    All wounds normally drain.  The larger the wound the more drainage there will be.  After 7-10 days, you will notice the wound beginning to shrink and new skin will begin to grow.  The wound is healed when you can see skin has formed over the entire area.  A healed wound has a healthy, shiny look to the surface and is red to  dark pink in color to normalize.  Wounds may take approximately 4-6 weeks to heal.  Larger wounds may take 6-8 weeks.  After the wound is healed you may discontinue dressing changes.    You may experience a sensation of tightness as your wound heals. This is normal and will gradually subside.    Your healed wound may be sensitive to temperature changes. This sensitivity improves with time, but if you re having a lot of discomfort, try to avoid temperature extremes.    Patients frequently experience itching after their wound appears to have healed because of the continue healing under the skin.  Plain Vaseline will help relieve the itching.        POSSIBLE COMPLICATIONS    BLEEDIN. Leave the bandage in place.  2. Use tightly rolled up gauze or a cloth to apply direct pressure over the bandage for 30  minutes.  3. Reapply pressure for an additional 30 minutes if necessary  4. Use additional gauze and tape to maintain pressure once the bleeding has stopped.

## 2018-09-17 NOTE — NURSING NOTE
"Initial /79  Pulse 62  SpO2 99% Estimated body mass index is 22.2 kg/(m^2) as calculated from the following:    Height as of 4/12/17: 1.727 m (5' 8\").    Weight as of 12/14/17: 66.2 kg (146 lb). .    Citlaly Del Rosario LPN    "

## 2018-09-18 NOTE — TELEPHONE ENCOUNTER
Tried to reach patient but phone rang > 20 times and No answer. No answering machine. Starla Nava RN

## 2018-09-18 NOTE — TELEPHONE ENCOUNTER
Pt calling would like to get more info on results.   appt already scheduled for Oct 10th    Please call    Aparna Fernández  Specialty CSS

## 2018-09-19 NOTE — TELEPHONE ENCOUNTER
Spoke to patient and answered multiple questions about skin cancer and Mohs surgery. Starla Nava RN

## 2018-09-26 ENCOUNTER — TELEPHONE (OUTPATIENT)
Dept: DERMATOLOGY | Facility: CLINIC | Age: 68
End: 2018-09-26

## 2018-09-26 NOTE — TELEPHONE ENCOUNTER
"Reason for call:  Patient reporting a symptom    Symptom or request: Pt wants to know what to expect after surgery - \"what kind of disfigurement she is looking at\"  Scheduled she thinks for Oct 10th.  States she doesn't want to talk to nurse - she wants to speak to FANTA Mata directly.  States she already spoke to a nurse and \"she wasn't all that helpful\"  \"I don't think this is too much to ask\"    Duration (how long have symptoms been present):     Have you been treated for this before? No    Additional comments:     Phone Number patient can be reached at:  Home number on file [phone] (home)    Best Time:  any    Can we leave a detailed message on this number:  YES    Call taken on 9/26/2018 at 11:30 AM by Asuncion Garcia    "

## 2018-09-27 NOTE — TELEPHONE ENCOUNTER
See note below.     When I spoke to patient on 9-19-18, I did advise there is no way to predict or guess how big a scar she will end up with- I stated it all would depend on how big the skin cancer ends up being and there is no way to guess or predict size of skin cancer as it can only be seen microscopically.     Starla Nava RN

## 2018-10-10 ENCOUNTER — OFFICE VISIT (OUTPATIENT)
Dept: DERMATOLOGY | Facility: CLINIC | Age: 68
End: 2018-10-10
Payer: MEDICARE

## 2018-10-10 VITALS — SYSTOLIC BLOOD PRESSURE: 102 MMHG | OXYGEN SATURATION: 99 % | HEART RATE: 59 BPM | DIASTOLIC BLOOD PRESSURE: 64 MMHG

## 2018-10-10 DIAGNOSIS — C44.311 BASAL CELL CARCINOMA OF LEFT ALA NASI: Primary | ICD-10-CM

## 2018-10-10 PROCEDURE — 17311 MOHS 1 STAGE H/N/HF/G: CPT | Mod: 51 | Performed by: DERMATOLOGY

## 2018-10-10 PROCEDURE — 15260 FTH/GFT FR N/E/E/L 20 SQCM/<: CPT | Performed by: DERMATOLOGY

## 2018-10-10 NOTE — PATIENT INSTRUCTIONS
Skin Graft Wound Care     Dermatology Clinic [phone]   NOSE    ? No strenuous activity for 48 hours. Resume moderate activity in 48 hours.  No heavy exercising until you are seen for follow up in one week.    ? Take Tylenol as needed for discomfort.                       ? No alcoholic beverages for 48 hours.    ? Leave the bandage in place until you come in for follow up in one week.  If the bandage becomes blood tinged or loose, reinforce it with gauze and tape.     ? Keep the bandage dry. Wash around it carefully.    ? If the tape becomes soiled or starts to come off, reinforce it with additional paper tape.    ? Do not smoke for 3 weeks; smoking is detrimental to wound healing and may cause the graft to die.    ? Avoid prolonged exposure to extremely cold temperatures for 3 weeks.    ? It is normal to have swelling and bruising around the surgical site. The bruising will fade in approximately 10-14 days. Elevate the area to reduce swelling.    ? Numbness, itchiness and sensitivity to temperature changes can occur after surgery and may take up to 18 months to normalize.    POSSIBLE COMPLICATIONS    BLEEDIN. Leave the bandage in place.  2. Use tightly rolled up gauze or a cloth to apply direct pressure over the bandage for 20 minutes.  3. Reapply pressure for an additional 20 minutes if necessary  4. Call the office or go to the nearest emergency room if pressure fails to stop the bleeding.  5. Use additional gauze and tape to maintain pressure once the bleeding has stopped.    PAIN:    1. Post operative pain should slowly get better, beginning the evening after surgery.  2. A sudden or severe increase in pain may indicate a problem. Call the office if this occurs.    In case of emergency phone: Dermatology Clinic: [phone]   or Dr Singh [phone]            Wound Care Instructions     FOR SUPERFICIAL WOUNDS     [facility] [phone]    [facility]  [phone]    EAR                   AFTER 24 HOURS YOU SHOULD REMOVE THE BANDAGE AND BEGIN DAILY DRESSING CHANGES AS FOLLOWS:     1) Remove Dressing.     2) Clean and dry the area with tap water using a Q-tip or sterile gauze pad.     3) Apply Vaseline, Aquaphor, Polysporin ointment or Bacitracin ointment over entire wound.  Do NOT use Neosporin ointment.     4) Cover the wound with a band-aid, or a sterile non-stick gauze pad and micropore paper tape      REPEAT THESE INSTRUCTIONS AT LEAST ONCE A DAY UNTIL THE WOUND HAS COMPLETELY HEALED.    It is an old wives tale that a wound heals better when it is exposed to air and allowed to dry out. The wound will heal faster with a better cosmetic result if it is kept moist with ointment and covered with a bandage.    **Do not let the wound dry out.**      Supplies Needed:      *Cotton tipped applicators (Q-tips)    *Polysporin Ointment or Bacitracin Ointment (NOT NEOSPORIN)    *Band-aids or non-stick gauze pads and micropore paper tape.      PATIENT INFORMATION:    During the healing process you will notice a number of changes. All wounds develop a small halo of redness surrounding the wound.  This means healing is occurring. Severe itching with extensive redness usually indicates sensitivity to the ointment or bandage tape used to dress the wound.  You should call our office if this develops.      Swelling  and/or discoloration around your surgical site is common, particularly when performed around the eye.    All wounds normally drain.  The larger the wound the more drainage there will be.  After 7-10 days, you will notice the wound beginning to shrink and new skin will begin to grow.  The wound is healed when you can see skin has formed over the entire area.  A healed wound has a healthy, shiny look to the surface and is red to dark pink in color to normalize.  Wounds may take approximately 4-6 weeks to heal.  Larger wounds may take 6-8 weeks.  After the wound is  healed you may discontinue dressing changes.    You may experience a sensation of tightness as your wound heals. This is normal and will gradually subside.    Your healed wound may be sensitive to temperature changes. This sensitivity improves with time, but if you re having a lot of discomfort, try to avoid temperature extremes.    Patients frequently experience itching after their wound appears to have healed because of the continue healing under the skin.  Plain Vaseline will help relieve the itching.        POSSIBLE COMPLICATIONS    BLEEDIN. Leave the bandage in place.  7. Use tightly rolled up gauze or a cloth to apply direct pressure over the bandage for 30  minutes.  8. Reapply pressure for an additional 30 minutes if necessary  9. Use additional gauze and tape to maintain pressure once the bleeding has stopped.

## 2018-10-10 NOTE — NURSING NOTE
"Initial /64  Pulse 59  SpO2 99% Estimated body mass index is 22.53 kg/(m^2) as calculated from the following:    Height as of 3/20/18: 1.715 m (5' 7.5\").    Weight as of 12/14/17: 66.2 kg (146 lb). .      "

## 2018-10-10 NOTE — PROGRESS NOTES
Leanne Zuñiga is a 67 year old year old female patient here today for evaluation and managment of basal cell carcinoma on left ala.  Patient has no other skin complaints today.  Remainder of the HPI, Meds, PMH, Allergies, FH, and SH was reviewed in chart.      Past Medical History:   Diagnosis Date     Basal cell carcinoma      Depressive disorder, not elsewhere classified 1990    stopped depression meds after 6 weeks of use     Malignant melanoma (H)      Pneumonia age 7       Past Surgical History:   Procedure Laterality Date     SURGICAL HISTORY OF -   age 7    T & A        Family History   Problem Relation Age of Onset     HEART DISEASE Father      Sudden death at age 68     Lipids Father      C.A.D. Father      Depression Mother      Suicidal     Cancer Mother      Occular Melanoma     Breast Cancer Mother      Anesthesia Reaction Mother      GASTROINTESTINAL DISEASE Mother      Ulcer     Respiratory Mother      Emphysema     Melanoma Mother      Depression Sister      Arthritis Sister      oseto artthritis     HEART DISEASE Paternal Grandmother      Alcohol/Drug Paternal Grandfather      Allergies Brother      GASTROINTESTINAL DISEASE Sister      Ulcer       Social History     Social History     Marital status: Single     Spouse name: N/A     Number of children: N/A     Years of education: N/A     Occupational History     Not on file.     Social History Main Topics     Smoking status: Former Smoker     Packs/day: 0.20     Years: 15.00     Types: Cigarettes     Quit date: 4/1/2011     Smokeless tobacco: Never Used     Alcohol use Yes      Comment: social wine weekly     Drug use: No     Sexual activity: Not Currently     Other Topics Concern     Parent/Sibling W/ Cabg, Mi Or Angioplasty Before 65f 55m? No     Social History Narrative       Outpatient Encounter Prescriptions as of 10/10/2018   Medication Sig Dispense Refill     acetaminophen (TYLENOL) 325 MG tablet Take 2 tablets (650 mg) by mouth every 4  hours as needed for pain 100 tablet 0     albuterol (VENTOLIN HFA) 108 (90 Base) MCG/ACT inhaler Inhale 2 puffs into the lungs       Alpha-Lipoic Acid 50 MG CAPS 100-200 mg daily       ascorbic acid (VITAMIN C) 500 MG tablet Take 3 tablets (1,500 mg) by mouth daily 30 tablet      calcium-magnesium (CALMAG) 500-250 MG TABS Take 1 tablet by mouth daily       Cholecalciferol (VITAMIN D) 2000 UNITS tablet Take 2,000 Units by mouth Twice weekly 100 tablet 3     ibuprofen (ADVIL,MOTRIN) 600 MG tablet Take 1 tablet (600 mg) by mouth every 6 hours as needed for pain 90 tablet 1     Krill Oil 1000 MG CAPS Taking 2 daily.       L-Tryptophan 500 MG CAPS Take 2 capsules by mouth       MISC NATURAL PRODUCTS PO Curamin       niacin 500 MG tablet Take 1 tablet (500 mg) by mouth daily (with breakfast) Takes 2000 mg daily       OVER-THE-COUNTER Bio-Flex.       OVER-THE-COUNTER Ashwagandha 400 mg daily       OVER-THE-COUNTER Tumeric       OVER-THE-COUNTER Megastress B with vitamin C 2 tablets daily       OVER-THE-COUNTER Powdered barely grass 1 tablespoon daily       diazepam (VALIUM) 5 MG tablet Take 1 tablet (5 mg) by mouth every 12 hours as needed for anxiety or sleep (Patient not taking: Reported on 9/17/2018) 60 tablet 0     HYDROcodone-acetaminophen (NORCO) 5-325 MG per tablet Take 1-2 tablets by mouth every 6 hours as needed for moderate to severe pain or pain (Patient not taking: Reported on 9/17/2018) 240 tablet 0     predniSONE (DELTASONE) 1 MG tablet Take 4 tablets (4 mg) by mouth daily (Patient not taking: Reported on 10/10/2018) 120 tablet 5     No facility-administered encounter medications on file as of 10/10/2018.              Review Of Systems  Skin: As above  Eyes: negative  Ears/Nose/Throat: negative  Respiratory: No shortness of breath, dyspnea on exertion, cough, or hemoptysis  Cardiovascular: negative  Gastrointestinal: negative  Genitourinary: negative  Musculoskeletal: negative  Neurologic:  negative  Psychiatric: negative  Hematologic/Lymphatic/Immunologic: negative  Endocrine: negative      O:   NAD, WDWN, Alert & Oriented, Mood & Affect wnl, Vitals stable   Here today alone   /64  Pulse 59  SpO2 99%   General appearance normal   Vitals stable   Alert, oriented and in no acute distress     L ala 5mm pink pearly papule       Eyes: Conjunctivae/lids:Normal     ENT: Lips, buccal mucosa, tongue: normal    MSK:Normal    Cardiovascular: peripheral edema none    Pulm: Breathing Normal    Neuro/Psych: Orientation:Normal; Mood/Affect:Normal      A/P:  1. l ALA BASAL CELL CARCINOMA   MOHS:   Location    After PGACAC discussed with patient, decision for Mohs surgery was made. Indication for Mohs was Location. Patient confirmed the site with Dr. Singh.  After anesthesia with LEC, the tumor was excised using standard Mohs technique in 1 stages(s).  CLEAR MARGINS OBTAINED and Final defect size was 0.8 cm.     REPAIR FTSG FROM MIMI: In order to avoid distortion and because of the proximity to the nasal tip and ala, a full-thickness skin graft was planned. After LEC anesthesia and prep, a template was made of the defect and the graft was harvested from the ipsilateral conchal bowl. The graft was defatted and trimmed to fit the defect. It was sutured into place with Fast Absorbing Plain Gut suture and a tie-over Bolster dressing was applied.    Hemostasis was obtained at the donor site which was then bandaged to heal by second intention.  EBL minimal; complications none; wound care routine.  The patient was discharged in good condition and will return on one week  for wound evaluation.          BENIGN LESIONS DISCUSSED WITH PATIENT:  I discussed the specifics of tumor, prognosis, and genetics of benign lesions.  I explained that treatment of these lesions would be purely cosmetic and not medically neccessary.  I discussed with patient different removal options including excision, cautery and /or laser.       Nature and genetics of benign skin lesions dicussed with patient.  Signs and Symptoms of skin cancer discussed with patient.  Patient encouraged to perform monthly skin exams.  UV precautions reviewed with patient.  Patient to follow up with Primary Care provider regarding elevated blood pressure.  Skin care regimen reviewed with patient: Eliminate harsh soaps, i.e. Dial, zest, irsih spring; Mild soaps such as Cetaphil or Dove sensitive skin, avoid hot or cold showers, aggressive use of emollients including vanicream, cetaphil or cerave discussed with patient.    Risks of non-melanoma skin cancer discussed with patient   Return to clinic 6 months  Patient to follow up with Primary Care provider regarding elevated blood pressure.

## 2018-10-10 NOTE — LETTER
10/10/2018         RE: Leanne Zuñiga  [address] [address] [id number]        Dear Colleague,    Thank you for referring your patient, Leanne Zuñiga, to the [facility]. Please see a copy of my visit note below.    Leanne Zuñiga is a 67 year old year old female patient here today for evaluation and managment of basal cell carcinoma on left ala.  Patient has no other skin complaints today.  Remainder of the HPI, Meds, PMH, Allergies, FH, and SH was reviewed in chart.      Past Medical History:   Diagnosis Date     Basal cell carcinoma      Depressive disorder, not elsewhere classified 1990    stopped depression meds after 6 weeks of use     Malignant melanoma (H)      Pneumonia age 7       Past Surgical History:   Procedure Laterality Date     SURGICAL HISTORY OF -   age 7    T & A        Family History   Problem Relation Age of Onset     HEART DISEASE Father      Sudden death at age 68     Lipids Father      C.A.D. Father      Depression Mother      Suicidal     Cancer Mother      Occular Melanoma     Breast Cancer Mother      Anesthesia Reaction Mother      GASTROINTESTINAL DISEASE Mother      Ulcer     Respiratory Mother      Emphysema     Melanoma Mother      Depression Sister      Arthritis Sister      oseto artthritis     HEART DISEASE Paternal Grandmother      Alcohol/Drug Paternal Grandfather      Allergies Brother      GASTROINTESTINAL DISEASE Sister      Ulcer       Social History     Social History     Marital status: Single     Spouse name: N/A     Number of children: N/A     Years of education: N/A     Occupational History     Not on file.     Social History Main Topics     Smoking status: Former Smoker     Packs/day: 0.20     Years: 15.00     Types: Cigarettes     Quit date: 4/1/2011     Smokeless tobacco: Never Used     Alcohol use Yes      Comment: social wine weekly     Drug use: No     Sexual activity: Not Currently     Other Topics Concern     Parent/Sibling W/  Cabg, Mi Or Angioplasty Before 65f 55m? No     Social History Narrative       Outpatient Encounter Prescriptions as of 10/10/2018   Medication Sig Dispense Refill     acetaminophen (TYLENOL) 325 MG tablet Take 2 tablets (650 mg) by mouth every 4 hours as needed for pain 100 tablet 0     albuterol (VENTOLIN HFA) 108 (90 Base) MCG/ACT inhaler Inhale 2 puffs into the lungs       Alpha-Lipoic Acid 50 MG CAPS 100-200 mg daily       ascorbic acid (VITAMIN C) 500 MG tablet Take 3 tablets (1,500 mg) by mouth daily 30 tablet      calcium-magnesium (CALMAG) 500-250 MG TABS Take 1 tablet by mouth daily       Cholecalciferol (VITAMIN D) 2000 UNITS tablet Take 2,000 Units by mouth Twice weekly 100 tablet 3     ibuprofen (ADVIL,MOTRIN) 600 MG tablet Take 1 tablet (600 mg) by mouth every 6 hours as needed for pain 90 tablet 1     Krill Oil 1000 MG CAPS Taking 2 daily.       L-Tryptophan 500 MG CAPS Take 2 capsules by mouth       MISC NATURAL PRODUCTS PO Curamin       niacin 500 MG tablet Take 1 tablet (500 mg) by mouth daily (with breakfast) Takes 2000 mg daily       OVER-THE-COUNTER Bio-Flex.       OVER-THE-COUNTER Ashwagandha 400 mg daily       OVER-THE-COUNTER Tumeric       OVER-THE-COUNTER Megastress B with vitamin C 2 tablets daily       OVER-THE-COUNTER Powdered barely grass 1 tablespoon daily       diazepam (VALIUM) 5 MG tablet Take 1 tablet (5 mg) by mouth every 12 hours as needed for anxiety or sleep (Patient not taking: Reported on 9/17/2018) 60 tablet 0     HYDROcodone-acetaminophen (NORCO) 5-325 MG per tablet Take 1-2 tablets by mouth every 6 hours as needed for moderate to severe pain or pain (Patient not taking: Reported on 9/17/2018) 240 tablet 0     predniSONE (DELTASONE) 1 MG tablet Take 4 tablets (4 mg) by mouth daily (Patient not taking: Reported on 10/10/2018) 120 tablet 5     No facility-administered encounter medications on file as of 10/10/2018.              Review Of Systems  Skin: As above  Eyes:  negative  Ears/Nose/Throat: negative  Respiratory: No shortness of breath, dyspnea on exertion, cough, or hemoptysis  Cardiovascular: negative  Gastrointestinal: negative  Genitourinary: negative  Musculoskeletal: negative  Neurologic: negative  Psychiatric: negative  Hematologic/Lymphatic/Immunologic: negative  Endocrine: negative      O:   NAD, WDWN, Alert & Oriented, Mood & Affect wnl, Vitals stable   Here today alone   /64  Pulse 59  SpO2 99%   General appearance normal   Vitals stable   Alert, oriented and in no acute distress     L ala 5mm pink pearly papule       Eyes: Conjunctivae/lids:Normal     ENT: Lips, buccal mucosa, tongue: normal    MSK:Normal    Cardiovascular: peripheral edema none    Pulm: Breathing Normal    Neuro/Psych: Orientation:Normal; Mood/Affect:Normal      A/P:  1. l ALA BASAL CELL CARCINOMA   MOHS:   Location    After PGACAC discussed with patient, decision for Mohs surgery was made. Indication for Mohs was Location. Patient confirmed the site with Dr. Singh.  After anesthesia with LEC, the tumor was excised using standard Mohs technique in 1 stages(s).  CLEAR MARGINS OBTAINED and Final defect size was 0.8 cm.     REPAIR FTSG FROM MIMI: In order to avoid distortion and because of the proximity to the nasal tip and ala, a full-thickness skin graft was planned. After LEC anesthesia and prep, a template was made of the defect and the graft was harvested from the ipsilateral conchal bowl. The graft was defatted and trimmed to fit the defect. It was sutured into place with Fast Absorbing Plain Gut suture and a tie-over Bolster dressing was applied.    Hemostasis was obtained at the donor site which was then bandaged to heal by second intention.  EBL minimal; complications none; wound care routine.  The patient was discharged in good condition and will return on one week  for wound evaluation.          BENIGN LESIONS DISCUSSED WITH PATIENT:  I discussed the specifics of tumor,  prognosis, and genetics of benign lesions.  I explained that treatment of these lesions would be purely cosmetic and not medically neccessary.  I discussed with patient different removal options including excision, cautery and /or laser.      Nature and genetics of benign skin lesions dicussed with patient.  Signs and Symptoms of skin cancer discussed with patient.  Patient encouraged to perform monthly skin exams.  UV precautions reviewed with patient.  Patient to follow up with Primary Care provider regarding elevated blood pressure.  Skin care regimen reviewed with patient: Eliminate harsh soaps, i.e. Dial, zest, irsih spring; Mild soaps such as Cetaphil or Dove sensitive skin, avoid hot or cold showers, aggressive use of emollients including vanicream, cetaphil or cerave discussed with patient.    Risks of non-melanoma skin cancer discussed with patient   Return to clinic 6 months  Patient to follow up with Primary Care provider regarding elevated blood pressure.        Again, thank you for allowing me to participate in the care of your patient.        Sincerely,        Kavin Singh MD

## 2018-10-10 NOTE — MR AVS SNAPSHOT
After Visit Summary   10/10/2018    Leanne Zuñiga    MRN: [id number]           Patient Information     Date Of Birth          1950        Visit Information        Provider Department      10/10/2018 7:45 AM Kavin Singh MD [facility]        Today's Diagnoses     Basal cell carcinoma of left ala nasi    -  1      Care Instructions    Skin Graft Wound Care     Dermatology Clinic [phone]   NOSE    ? No strenuous activity for 48 hours. Resume moderate activity in 48 hours.  No heavy exercising until you are seen for follow up in one week.    ? Take Tylenol as needed for discomfort.                       ? No alcoholic beverages for 48 hours.    ? Leave the bandage in place until you come in for follow up in one week.  If the bandage becomes blood tinged or loose, reinforce it with gauze and tape.     ? Keep the bandage dry. Wash around it carefully.    ? If the tape becomes soiled or starts to come off, reinforce it with additional paper tape.    ? Do not smoke for 3 weeks; smoking is detrimental to wound healing and may cause the graft to die.    ? Avoid prolonged exposure to extremely cold temperatures for 3 weeks.    ? It is normal to have swelling and bruising around the surgical site. The bruising will fade in approximately 10-14 days. Elevate the area to reduce swelling.    ? Numbness, itchiness and sensitivity to temperature changes can occur after surgery and may take up to 18 months to normalize.    POSSIBLE COMPLICATIONS    BLEEDIN. Leave the bandage in place.  2. Use tightly rolled up gauze or a cloth to apply direct pressure over the bandage for 20 minutes.  3. Reapply pressure for an additional 20 minutes if necessary  4. Call the office or go to the nearest emergency room if pressure fails to stop the bleeding.  5. Use additional gauze and tape to maintain pressure once the bleeding has stopped.    PAIN:    1. Post operative pain should slowly  get better, beginning the evening after surgery.  2. A sudden or severe increase in pain may indicate a problem. Call the office if this occurs.    In case of emergency phone: Dermatology Clinic: [phone]   or Dr Singh [phone]            Wound Care Instructions     FOR SUPERFICIAL WOUNDS     [facility] [phone]    [facility] [phone]    EAR                   AFTER 24 HOURS YOU SHOULD REMOVE THE BANDAGE AND BEGIN DAILY DRESSING CHANGES AS FOLLOWS:     1) Remove Dressing.     2) Clean and dry the area with tap water using a Q-tip or sterile gauze pad.     3) Apply Vaseline, Aquaphor, Polysporin ointment or Bacitracin ointment over entire wound.  Do NOT use Neosporin ointment.     4) Cover the wound with a band-aid, or a sterile non-stick gauze pad and micropore paper tape      REPEAT THESE INSTRUCTIONS AT LEAST ONCE A DAY UNTIL THE WOUND HAS COMPLETELY HEALED.    It is an old wives tale that a wound heals better when it is exposed to air and allowed to dry out. The wound will heal faster with a better cosmetic result if it is kept moist with ointment and covered with a bandage.    **Do not let the wound dry out.**      Supplies Needed:      *Cotton tipped applicators (Q-tips)    *Polysporin Ointment or Bacitracin Ointment (NOT NEOSPORIN)    *Band-aids or non-stick gauze pads and micropore paper tape.      PATIENT INFORMATION:    During the healing process you will notice a number of changes. All wounds develop a small halo of redness surrounding the wound.  This means healing is occurring. Severe itching with extensive redness usually indicates sensitivity to the ointment or bandage tape used to dress the wound.  You should call our office if this develops.      Swelling  and/or discoloration around your surgical site is common, particularly when performed around the eye.    All wounds normally drain.  The larger the wound the more drainage there will be.  After 7-10  days, you will notice the wound beginning to shrink and new skin will begin to grow.  The wound is healed when you can see skin has formed over the entire area.  A healed wound has a healthy, shiny look to the surface and is red to dark pink in color to normalize.  Wounds may take approximately 4-6 weeks to heal.  Larger wounds may take 6-8 weeks.  After the wound is healed you may discontinue dressing changes.    You may experience a sensation of tightness as your wound heals. This is normal and will gradually subside.    Your healed wound may be sensitive to temperature changes. This sensitivity improves with time, but if you re having a lot of discomfort, try to avoid temperature extremes.    Patients frequently experience itching after their wound appears to have healed because of the continue healing under the skin.  Plain Vaseline will help relieve the itching.        POSSIBLE COMPLICATIONS    BLEEDIN. Leave the bandage in place.  7. Use tightly rolled up gauze or a cloth to apply direct pressure over the bandage for 30  minutes.  8. Reapply pressure for an additional 30 minutes if necessary  9. Use additional gauze and tape to maintain pressure once the bleeding has stopped.            Follow-ups after your visit        Your next 10 appointments already scheduled     Dec 17, 2018 10:30 AM CST   [facility] with Misty Pro PA-C   [facility]    [address] [address] [phone]              Who to contact     If you have questions or need follow up information about today's clinic visit or your schedule please contact [facility] directly at [phone].  Normal or non-critical lab and imaging results will be communicated to you by MyChart, letter or phone within 4 business days after the clinic has received the results. If you do not hear from us within 7 days, please contact the clinic through MyChart or phone.  "If you have a critical or abnormal lab result, we will notify you by phone as soon as possible.  Submit refill requests through [employer] or call your pharmacy and they will forward the refill request to us. Please allow 3 business days for your refill to be completed.          Additional Information About Your Visit        [employer] Information     [employer] lets you send messages to your doctor, view your test results, renew your prescriptions, schedule appointments and more. To sign up, go to [email or web address] . Click on \"Log in\" on the left side of the screen, which will take you to the Welcome page. Then click on \"Sign up Now\" on the right side of the page.     You will be asked to enter the access code listed below, as well as some personal information. Please follow the directions to create your username and password.     Your access code is: [email or web address]  Expires: 2018 11:57 AM     Your access code will  in 90 days. If you need help or a new code, please call your [place] clinic or [phone].        Care EveryWhere ID     This is your Care EveryWhere ID. This could be used by other organizations to access your [place] medical records  [id number]        Your Vitals Were     Pulse Pulse Oximetry                59 99%           Blood Pressure from Last 3 Encounters:   10/10/18 102/64   18 119/79   18 121/71    Weight from Last 3 Encounters:   17 66.2 kg (146 lb)   10/11/17 67.6 kg (149 lb)   17 69.1 kg (152 lb 6.4 oz)              We Performed the Following     30439 - FULL THICK GRAFT HEAD/AXIL/GEN/HND/FT EA ADDTL 20 CM OR LESS     MOHS HEAD/NCK/HND/FT/GEN 1ST STAGE UP T0 5 BLOCKS        Primary Care Provider Office Phone # Fax #    Cleo ADAMS Carl [phone]       [facility] [address] [address]        Equal Access to Services     LUIS ANGEL MADDOX : Nirav Rice, juan joséda lizette, qaybta lalo wright, " lissy lamcatracho lynbreanna la'aan ah. So [facility] [phone].    ATENCIÓN: Si kimberly espinoza, tiene a arias disposición servicios gratuitos de asistencia lingüística. Colt kuhn [phone].    We comply with applicable federal civil rights laws and [place] laws. We do not discriminate on the basis of race, color, national origin, age, disability, sex, sexual orientation, or gender identity.            Thank you!     Thank you for choosing [facility]  for your care. Our goal is always to provide you with excellent care. Hearing back from our patients is one way we can continue to improve our services. Please take a few minutes to complete the written survey that you may receive in the mail after your visit with us. Thank you!             Your Updated Medication List - Protect others around you: Learn how to safely use, store and throw away your medicines at www.disposemymeds.org.          This list is accurate as of 10/10/18  9:54 AM.  Always use your most recent med list.                   Brand Name Dispense Instructions for use Diagnosis    acetaminophen 325 MG tablet    TYLENOL    100 tablet    Take 2 tablets (650 mg) by mouth every 4 hours as needed for pain    RA (rheumatoid arthritis) (H)       Alpha-Lipoic Acid 50 MG Caps      100-200 mg daily    RA (rheumatoid arthritis) (H)       ascorbic acid 500 MG tablet    VITAMIN C    30 tablet    Take 3 tablets (1,500 mg) by mouth daily    RA (rheumatoid arthritis) (H)       calcium-magnesium 500-250 MG Tabs per tablet    CALMAG     Take 1 tablet by mouth daily    RA (rheumatoid arthritis) (H)       diazepam 5 MG tablet    VALIUM    60 tablet    Take 1 tablet (5 mg) by mouth every 12 hours as needed for anxiety or sleep    Rheumatoid arthritis involving multiple sites, unspecified rheumatoid factor presence (H)       HYDROcodone-acetaminophen 5-325 MG per tablet    NORCO    240 tablet    Take 1-2 tablets by mouth every 6 hours as needed for moderate to  severe pain or pain    Rash and nonspecific skin eruption       ibuprofen 600 MG tablet    ADVIL/MOTRIN    90 tablet    Take 1 tablet (600 mg) by mouth every 6 hours as needed for pain    RA (rheumatoid arthritis) (H)       Krill Oil 1000 MG Caps      Taking 2 daily.        L-Tryptophan 500 MG Caps      Take 2 capsules by mouth        MISC NATURAL PRODUCTS PO      Curamin        niacin 500 MG tablet      Take 1 tablet (500 mg) by mouth daily (with breakfast) Takes 2000 mg daily    RA (rheumatoid arthritis) (H)       OVER-THE-COUNTER      Tumeric        * OVER-THE-COUNTER      Megastress B with vitamin C 2 tablets daily    RA (rheumatoid arthritis) (H)       * OVER-THE-COUNTER      Powdered barely grass 1 tablespoon daily    RA (rheumatoid arthritis) (H)       OVER-THE-COUNTER      Bio-Flex.    RA (rheumatoid arthritis) (H)       OVER-THE-COUNTER      Ashwagandha 400 mg daily    RA (rheumatoid arthritis) (H)       predniSONE 1 MG tablet    DELTASONE    120 tablet    Take 4 tablets (4 mg) by mouth daily    Rheumatoid arthritis of multiple sites without rheumatoid factor (H)       VENTOLIN  (90 Base) MCG/ACT inhaler   Generic drug:  albuterol      Inhale 2 puffs into the lungs        vitamin D 2000 units tablet     100 tablet    Take 2,000 Units by mouth Twice weekly    RA (rheumatoid arthritis) (H)       * Notice:  This list has 2 medication(s) that are the same as other medications prescribed for you. Read the directions carefully, and ask your doctor or other care provider to review them with you.

## 2018-10-17 ENCOUNTER — ALLIED HEALTH/NURSE VISIT (OUTPATIENT)
Dept: DERMATOLOGY | Facility: CLINIC | Age: 68
End: 2018-10-17
Payer: MEDICARE

## 2018-10-17 DIAGNOSIS — Z48.02 ATTENTION TO DRESSINGS AND SUTURES: Primary | ICD-10-CM

## 2018-10-17 DIAGNOSIS — Z48.00 ATTENTION TO DRESSINGS AND SUTURES: Primary | ICD-10-CM

## 2018-10-17 PROCEDURE — 99207 ZZC NO CHARGE NURSE ONLY: CPT

## 2018-10-17 NOTE — PROGRESS NOTES
Pt returned to clinic for post surgery 1 week follow up bandage change. Pt has no complaints, denies pain. Bolster  removed from nose area cleansed with normal saline. Site is healing and wound edges approximating well. Applied Aquaphor and a bandage.    Advised to watch for signs/sx of infection; spreading redness, drainage, odor, fever. Call or report promptly to clinic. Pt given written instructions and informed to rtc as needed. Patient verbalized understanding.     Najma Lopez CMA

## 2018-10-17 NOTE — MR AVS SNAPSHOT
After Visit Summary   10/17/2018    Leanne Zuñiga    MRN: [id number]           Patient Information     Date Of Birth          1950        Visit Information        Provider Department      10/17/2018 1:00 PM [facility]        Care Instructions    ONE WEEK DRESSING CHANGE  for  SKIN GRAFTS  The following information has been compiled to offer you assistance with the dressing change or wound evaluation. Please feel free to call our office to speak with one of the nurses if you have any questions or concerns about the progress of the wound healing process especially if there are any signs of graft necrosis or infection. We will be happy to answer any questions you might have.                                                               AFTER 24 HOURS YOU SHOULD REMOVE THE BANDAGE AND BEGIN DAILY DRESSING CHANGES AS FOLLOWS:     1) Remove Dressing.     2) Clean and dry the area with tap water using a Q-tip or sterile gauze pad.     3) Apply Vaseline, Polysporin ointment, Aquaphor or Bacitracin ointment over entire wound.  Do NOT use Neosporin ointment.     4) Cover the wound with a band-aid, or a sterile non-stick gauze pad and micropore paper tape      REPEAT THESE INSTRUCTIONS AT LEAST ONCE A DAY UNTIL THE WOUND HAS COMPLETELY HEALED. DO NOT LET THE WOUND SCAB OVER.    It is an old wives tale that a wound heals better when it is exposed to air and allowed to dry out. The wound will heal faster with a better cosmetic result if it is kept moist with ointment and covered with a bandage.       Massaging the wound site hastens the healing process by softening the scar tissue and fading the scar. Begin massaging the area one month after surgery as often as possible. Continue to massage the area for 2-3 months or until you feel the scar tissue has softened. Moisturizers can be used during the massaging but are not necessary. Ultimately it takes six months for the graft to  heal and blend into the surrounding skin.          Follow-ups after your visit        Your next 10 appointments already scheduled     Dec 17, 2018 10:30 AM CST   [facility] Misty Pro PA-C   [facility]    [address] [address] [phone]              Who to contact     If you have questions or need follow up information about today's clinic visit or your schedule please contact [facility] directly at [phone].  Normal or non-critical lab and imaging results will be communicated to you by MyChart, letter or phone within 4 business days after the clinic has received the results. If you do not hear from us within 7 days, please contact the clinic through MyChart or phone. If you have a critical or abnormal lab result, we will notify you by phone as soon as possible.  Submit refill requests through [employer] or call your pharmacy and they will forward the refill request to us. Please allow 3 business days for your refill to be completed.          Additional Information About Your Visit        Care EveryWhere ID     This is your Care EveryWhere ID. This could be used by other organizations to access your [place] medical records  [id number]         Blood Pressure from Last 3 Encounters:   10/10/18 102/64   09/17/18 119/79   06/18/18 121/71    Weight from Last 3 Encounters:   12/14/17 66.2 kg (146 lb)   10/11/17 67.6 kg (149 lb)   08/09/17 69.1 kg (152 lb 6.4 oz)              Today, you had the following     No orders found for display       Primary Care Provider Office Phone # Fax #    Cleo Henry [phone]       [facility] [address] [address]        Equal Access to Services     LUIS ANGEL MADDOX : Nirav Rice, vinicio bauer, ben wright, lissy mckeon. So [facility] [phone].    ATENCIÓN: consuelo Pascual  arias disposición servicios gratuitos de asistencia lingüística. Colt kuhn [phone].    We comply with applicable federal civil rights laws and [place] laws. We do not discriminate on the basis of race, color, national origin, age, disability, sex, sexual orientation, or gender identity.            Thank you!     Thank you for choosing [facility]  for your care. Our goal is always to provide you with excellent care. Hearing back from our patients is one way we can continue to improve our services. Please take a few minutes to complete the written survey that you may receive in the mail after your visit with us. Thank you!             Your Updated Medication List - Protect others around you: Learn how to safely use, store and throw away your medicines at www.disposemymeds.org.          This list is accurate as of 10/17/18  1:24 PM.  Always use your most recent med list.                   Brand Name Dispense Instructions for use Diagnosis    acetaminophen 325 MG tablet    TYLENOL    100 tablet    Take 2 tablets (650 mg) by mouth every 4 hours as needed for pain    RA (rheumatoid arthritis) (H)       Alpha-Lipoic Acid 50 MG Caps      100-200 mg daily    RA (rheumatoid arthritis) (H)       ascorbic acid 500 MG tablet    VITAMIN C    30 tablet    Take 3 tablets (1,500 mg) by mouth daily    RA (rheumatoid arthritis) (H)       calcium-magnesium 500-250 MG Tabs per tablet    CALMAG     Take 1 tablet by mouth daily    RA (rheumatoid arthritis) (H)       diazepam 5 MG tablet    VALIUM    60 tablet    Take 1 tablet (5 mg) by mouth every 12 hours as needed for anxiety or sleep    Rheumatoid arthritis involving multiple sites, unspecified rheumatoid factor presence (H)       HYDROcodone-acetaminophen 5-325 MG per tablet    NORCO    240 tablet    Take 1-2 tablets by mouth every 6 hours as needed for moderate to severe pain or pain    Rash and nonspecific skin eruption       ibuprofen 600 MG tablet    ADVIL/MOTRIN     90 tablet    Take 1 tablet (600 mg) by mouth every 6 hours as needed for pain    RA (rheumatoid arthritis) (H)       Krill Oil 1000 MG Caps      Taking 2 daily.        L-Tryptophan 500 MG Caps      Take 2 capsules by mouth        MISC NATURAL PRODUCTS PO      Curamin        niacin 500 MG tablet      Take 1 tablet (500 mg) by mouth daily (with breakfast) Takes 2000 mg daily    RA (rheumatoid arthritis) (H)       OVER-THE-COUNTER      Tumeric        * OVER-THE-COUNTER      Megastress B with vitamin C 2 tablets daily    RA (rheumatoid arthritis) (H)       * OVER-THE-COUNTER      Powdered barely grass 1 tablespoon daily    RA (rheumatoid arthritis) (H)       OVER-THE-COUNTER      Bio-Flex.    RA (rheumatoid arthritis) (H)       OVER-THE-COUNTER      Ashwagandha 400 mg daily    RA (rheumatoid arthritis) (H)       predniSONE 1 MG tablet    DELTASONE    120 tablet    Take 4 tablets (4 mg) by mouth daily    Rheumatoid arthritis of multiple sites without rheumatoid factor (H)       VENTOLIN  (90 Base) MCG/ACT inhaler   Generic drug:  albuterol      Inhale 2 puffs into the lungs        vitamin D 2000 units tablet     100 tablet    Take 2,000 Units by mouth Twice weekly    RA (rheumatoid arthritis) (H)       * Notice:  This list has 2 medication(s) that are the same as other medications prescribed for you. Read the directions carefully, and ask your doctor or other care provider to review them with you.

## 2018-12-17 ENCOUNTER — OFFICE VISIT (OUTPATIENT)
Dept: DERMATOLOGY | Facility: CLINIC | Age: 68
End: 2018-12-17
Payer: MEDICARE

## 2018-12-17 VITALS
DIASTOLIC BLOOD PRESSURE: 63 MMHG | RESPIRATION RATE: 18 BRPM | HEIGHT: 68 IN | SYSTOLIC BLOOD PRESSURE: 117 MMHG | TEMPERATURE: 97.9 F | HEART RATE: 52 BPM | OXYGEN SATURATION: 98 % | BODY MASS INDEX: 22.2 KG/M2

## 2018-12-17 DIAGNOSIS — D18.00 ANGIOMA: ICD-10-CM

## 2018-12-17 DIAGNOSIS — Z85.820 HISTORY OF MELANOMA: ICD-10-CM

## 2018-12-17 DIAGNOSIS — L81.4 LENTIGO: ICD-10-CM

## 2018-12-17 DIAGNOSIS — L82.1 SEBORRHEIC KERATOSIS: ICD-10-CM

## 2018-12-17 DIAGNOSIS — D22.9 NEVUS: Primary | ICD-10-CM

## 2018-12-17 DIAGNOSIS — Z85.828 HISTORY OF BASAL CELL CARCINOMA (BCC) OF SKIN: ICD-10-CM

## 2018-12-17 PROCEDURE — 99214 OFFICE O/P EST MOD 30 MIN: CPT | Performed by: PHYSICIAN ASSISTANT

## 2018-12-17 NOTE — PROGRESS NOTES
"Chief Complaint   Patient presents with     Skin Check       Vitals:    12/17/18 1023   BP: 117/63   BP Location: Left arm   Patient Position: Sitting   Cuff Size: Adult Regular   Pulse: 52   Resp: 18   Temp: 97.9  F (36.6  C)   TempSrc: Oral   SpO2: 98%   Height: 1.727 m (5' 8\")     Wt Readings from Last 1 Encounters:   12/14/17 66.2 kg (146 lb)       Keila GROSS RN   [facility]ty Clinics   "

## 2018-12-17 NOTE — PROGRESS NOTES
"HPI:   Leanne Zuñiga is a 67 year old female who presents for Full skin cancer screening.  chief complaint  Last Skin Exam: about 3 months ago    1st Baseline: no  Personal HX of Skin Cancer: Yes , both MIS and most recently BCC on the left ala 9/2018  Personal HX of Malignant Melanoma: Yes MIS on the left shoulder 2018  Family HX of Skin Cancer / Malignant Melanoma: yes mother with melanoma; dec'd from this. Ocular melanoma.   Personal HX of Atypical Moles:   no  Risk factors: sun exposure; some tanning bed use  New / Changing lesions: white flaky areas on scalp.  Social History: lives near [place]. She recently stopped Orencia due to increased risk for malignancy. Her RA is flaring.  Intermittent prednisone use.  Seeing ortho surgeon in 2 days, hoping for injection. Had positive TB test although Chest x-ray was clear.   On review of systems, there are no further skin complaints, patient is feeling otherwise well.  See patient intake sheet.  ROS of the following were done and are negative: Constitutional, Eyes, Ears, Nose,   Mouth, Throat, Cardiovascular, Respiratory, GI, Genitourinary, Musculoskeletal,   Psychiatric, Endocrine, Allergic/Immunologic.    This document serves as a record of the services and decisions personally performed and made by Misty Pro PA-C. It was created on her behalf by Isabelle Dawkins, a trained medical scribe. The creation of this document is based the provider's statements to the medical scribe.  Isabelle Dawkins December 17, 2018 10:32 AM    PHYSICAL EXAM:   /63 (BP Location: Left arm, Patient Position: Sitting, Cuff Size: Adult Regular)   Pulse 52   Temp 97.9  F (36.6  C) (Oral)   Resp 18   Ht 1.727 m (5' 8\")   SpO2 98%   BMI 22.20 kg/m    Skin exam performed as follows: Type 2 skin. Mood appropriate  Alert and Oriented X 3. Well developed, well nourished in no distress.  General appearance: Normal  Head including face: Normal  Eyes: conjunctiva and lids: " Normal  Mouth: Lips, teeth, gums: Normal  Neck: Normal  Chest-breast/axillae: Normal  Back: Normal  Spleen and liver: Normal  Cardiovascular: Exam of peripheral vascular system by observation for swelling, varicosities, edema: Normal  Genitalia: groin, buttocks: Normal  Extremities: digits/nails (clubbing): Normal  Eccrine and Apocrine glands: Normal  Right upper extremity: Normal  Left upper extremity: Normal  Right lower extremity: Normal  Left lower extremity: Normal  Skin: Scalp and body hair: See below    Pt deferred exam of breasts, groin, buttocks: No    Other physical findings:  1. Multiple pigmented macules on extremities and trunk  2. Multiple pigmented macules on face, trunk and extremities  3. Multiple vascular papules on trunk, arms and legs  4. Multiple scattered keratotic plaques     Except as noted above, no other signs of skin cancer or melanoma.     ASSESSMENT/PLAN:   Benign Full skin cancer screening today. Patient with history of malignant melanoma 3/2018 depth 0.4 mm; BCC 9/2018; fhx of melanoma (ocular melanoma) in mother  Advised on monthly self exams and 3 months  Patient Education: Appropriate brochures given.    Multiple benign appearing nevi on arms, legs and trunk. Discussed ABCDEs of melanoma and sunscreen.   Multiple lentigos on arms, legs and trunk. Advised benign, no treatment needed.  Multiple scattered angiomas. Advised benign, no treatment needed.   Seborrheic keratosis on arms, legs and trunk. Advised benign, no treatment needed.  History of melanoma - continue Q 3 month FSE. No lymphadenopathy palpated today. Pt        is doing this regularly at home.   History of rheumatoid arthritis not on biologics due to risk of immune suppression and melanoma risk. Is not seeing her rheumatologist but is having considerable joint pain. Seeing an ortho for possible joint injection. Discussed IMK (60 mg split) as a possible treatment as an alternative to PO prednisone.         Follow-up: Q 3  month FSE/PRN sooner    1.) Patient was asked about new and changing moles. YES  2.) Patient received a complete physical skin examination: YES  3.) Patient was counseled to perform a monthly self skin examination: YES  Scribed By: Isabelle Dawkins, Medical Scribe    The information in this document, created by the medical scribe for me, accurately reflects the services I personally performed and the decisions made by me. I have reviewed and approved this document for accuracy prior to leaving the patient care area.  Misty Pro PA-C December 17, 2018 10:45 AM

## 2018-12-17 NOTE — LETTER
"    12/17/2018         RE: Leanne Zuñiga  [address]  [facility] [address]        Dear Colleague,    Thank you for referring your patient, Leanne Zuñiga, to the [facility]. Please see a copy of my visit note below.    Chief Complaint   Patient presents with     Skin Check       Vitals:    12/17/18 1023   BP: 117/63   BP Location: Left arm   Patient Position: Sitting   Cuff Size: Adult Regular   Pulse: 52   Resp: 18   Temp: 97.9  F (36.6  C)   TempSrc: Oral   SpO2: 98%   Height: 1.727 m (5' 8\")     Wt Readings from Last 1 Encounters:   12/14/17 66.2 kg (146 lb)       Keila GROSS RN   [facility]     HPI:   Leanne Zuñiga is a 67 year old female who presents for Full skin cancer screening.  chief complaint  Last Skin Exam: about 3 months ago    1st Baseline: no  Personal HX of Skin Cancer: Yes , both MIS and most recently BCC on the left ala 9/2018  Personal HX of Malignant Melanoma: Yes MIS on the left shoulder 2018  Family HX of Skin Cancer / Malignant Melanoma: yes mother with melanoma; dec'd from this. Ocular melanoma.   Personal HX of Atypical Moles:   no  Risk factors: sun exposure; some tanning bed use  New / Changing lesions: white flaky areas on scalp.  Social History: lives near [place]. She recently stopped Orencia due to increased risk for malignancy. Her RA is flaring.  Intermittent prednisone use.  Seeing ortho surgeon in 2 days, hoping for injection. Had positive TB test although Chest x-ray was clear.   On review of systems, there are no further skin complaints, patient is feeling otherwise well.  See patient intake sheet.  ROS of the following were done and are negative: Constitutional, Eyes, Ears, Nose,   Mouth, Throat, Cardiovascular, Respiratory, GI, Genitourinary, Musculoskeletal,   Psychiatric, Endocrine, Allergic/Immunologic.    This document serves as a record of the services and decisions personally performed and made by Misty Pro PA-C. It was " "created on her behalf by Isabelle Dawkins, a trained medical scribe. The creation of this document is based the provider's statements to the medical scribe.  Isabelle Dawkins December 17, 2018 10:32 AM    PHYSICAL EXAM:   /63 (BP Location: Left arm, Patient Position: Sitting, Cuff Size: Adult Regular)   Pulse 52   Temp 97.9  F (36.6  C) (Oral)   Resp 18   Ht 1.727 m (5' 8\")   SpO2 98%   BMI 22.20 kg/m     Skin exam performed as follows: Type 2 skin. Mood appropriate  Alert and Oriented X 3. Well developed, well nourished in no distress.  General appearance: Normal  Head including face: Normal  Eyes: conjunctiva and lids: Normal  Mouth: Lips, teeth, gums: Normal  Neck: Normal  Chest-breast/axillae: Normal  Back: Normal  Spleen and liver: Normal  Cardiovascular: Exam of peripheral vascular system by observation for swelling, varicosities, edema: Normal  Genitalia: groin, buttocks: Normal  Extremities: digits/nails (clubbing): Normal  Eccrine and Apocrine glands: Normal  Right upper extremity: Normal  Left upper extremity: Normal  Right lower extremity: Normal  Left lower extremity: Normal  Skin: Scalp and body hair: See below    Pt deferred exam of breasts, groin, buttocks: No    Other physical findings:  1. Multiple pigmented macules on extremities and trunk  2. Multiple pigmented macules on face, trunk and extremities  3. Multiple vascular papules on trunk, arms and legs  4. Multiple scattered keratotic plaques     Except as noted above, no other signs of skin cancer or melanoma.     ASSESSMENT/PLAN:   Benign Full skin cancer screening today. Patient with history of malignant melanoma 3/2018 depth 0.4 mm; BCC 9/2018; fhx of melanoma (ocular melanoma) in mother  Advised on monthly self exams and 3 months  Patient Education: Appropriate brochures given.    Multiple benign appearing nevi on arms, legs and trunk. Discussed ABCDEs of melanoma and sunscreen.   Multiple lentigos on arms, legs and trunk. Advised " benign, no treatment needed.  Multiple scattered angiomas. Advised benign, no treatment needed.   Seborrheic keratosis on arms, legs and trunk. Advised benign, no treatment needed.  History of melanoma - continue Q 3 month FSE. No lymphadenopathy palpated today. Pt        is doing this regularly at home.   History of rheumatoid arthritis not on biologics due to risk of immune suppression and melanoma risk. Is not seeing her rheumatologist but is having considerable joint pain. Seeing an ortho for possible joint injection. Discussed IMK (60 mg split) as a possible treatment as an alternative to PO prednisone.         Follow-up: Q 3 month FSE/PRN sooner    1.) Patient was asked about new and changing moles. YES  2.) Patient received a complete physical skin examination: YES  3.) Patient was counseled to perform a monthly self skin examination: YES  Scribed By: Isabelle Dawkins Medical Scribe    The information in this document, created by the medical scribe for me, accurately reflects the services I personally performed and the decisions made by me. I have reviewed and approved this document for accuracy prior to leaving the patient care area.  Misty Pro PA-C December 17, 2018 10:45 AM      Again, thank you for allowing me to participate in the care of your patient.        Sincerely,        Misty Pro PA-C

## 2019-04-04 ENCOUNTER — OFFICE VISIT (OUTPATIENT)
Dept: DERMATOLOGY | Facility: CLINIC | Age: 69
End: 2019-04-04
Payer: MEDICARE

## 2019-04-04 VITALS — SYSTOLIC BLOOD PRESSURE: 127 MMHG | OXYGEN SATURATION: 99 % | DIASTOLIC BLOOD PRESSURE: 86 MMHG | HEART RATE: 60 BPM

## 2019-04-04 DIAGNOSIS — Z86.006 HISTORY OF MELANOMA IN SITU: ICD-10-CM

## 2019-04-04 DIAGNOSIS — Z85.828 HISTORY OF BASAL CELL CARCINOMA (BCC) OF SKIN: ICD-10-CM

## 2019-04-04 DIAGNOSIS — L82.1 SEBORRHEIC KERATOSIS: ICD-10-CM

## 2019-04-04 DIAGNOSIS — L81.4 LENTIGO: ICD-10-CM

## 2019-04-04 DIAGNOSIS — Z80.8 FAMILY HISTORY OF MELANOMA: ICD-10-CM

## 2019-04-04 DIAGNOSIS — D22.9 NEVUS: Primary | ICD-10-CM

## 2019-04-04 DIAGNOSIS — D18.00 ANGIOMA: ICD-10-CM

## 2019-04-04 PROCEDURE — 99214 OFFICE O/P EST MOD 30 MIN: CPT | Performed by: PHYSICIAN ASSISTANT

## 2019-04-04 NOTE — LETTER
4/4/2019         RE: Leanne Zuñiga  [address] [facility] [address]        Dear Colleague,    Thank you for referring your patient, Leanne Zuñiga, to the [facility]. Please see a copy of my visit note below.    HPI:   Leanne Zuñiga is a 68 year old female who presents for Full skin cancer screening.  chief complaint  Last Skin Exam: about 3 months ago    1st Baseline: no  Personal HX of Skin Cancer: Yes , both MIS and most recently BCC on the left ala 9/2018  Personal HX of Malignant Melanoma: Yes MIS on the left shoulder 2018  Family HX of Skin Cancer / Malignant Melanoma: yes mother with melanoma; dec'd from this. Ocular melanoma.   Personal HX of Atypical Moles:   no  Risk factors: sun exposure; some tanning bed use  New / Changing lesions: No  Social History: lives near [place]. She recently stopped Orencia due to increased risk for malignancy. Her RA is flaring.  Intermittent prednisone use.  Seeing ortho surgeon in 2 days, hoping for injection. 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  See patient intake sheet.  ROS of the following were done and are negative: Constitutional, Eyes, Ears, Nose,   Mouth, Throat, Cardiovascular, Respiratory, GI, Genitourinary, Musculoskeletal,   Psychiatric, Endocrine, Allergic/Immunologic.       PHYSICAL EXAM:   /86   Pulse 60   SpO2 99%   Skin exam performed as follows: Type 2 skin. Mood appropriate  Alert and Oriented X 3. Well developed, well nourished in no distress.  General appearance: Normal  Head including face: Normal  Eyes: conjunctiva and lids: Normal  Mouth: Lips, teeth, gums: Normal  Neck: Normal  Chest-breast/axillae: Normal  Back: Normal  Spleen and liver: Normal  Cardiovascular: Exam of peripheral vascular system by observation for swelling, varicosities, edema: Normal  Genitalia: groin, buttocks:  Normal  Extremities: digits/nails (clubbing): Normal  Eccrine and Apocrine glands: Normal  Right upper extremity: Normal  Left upper extremity: Normal  Right lower extremity: Normal  Left lower extremity: Normal  Skin: Scalp and body hair: See below    Pt deferred exam of breasts, groin, buttocks: No    Other physical findings:  1. Multiple pigmented macules on extremities and trunk  2. Multiple pigmented macules on face, trunk and extremities  3. Multiple vascular papules on trunk, arms and legs  4. Multiple scattered keratotic plaques     Except as noted above, no other signs of skin cancer or melanoma.     ASSESSMENT/PLAN:   Benign Full skin cancer screening today. Patient with history of malignant melanoma 3/2018 depth 0.4 mm; BCC 9/2018; fhx of melanoma (ocular melanoma) in mother  Advised on monthly self exams and 3 months  Patient Education: Appropriate brochures given.    Multiple benign appearing nevi on arms, legs and trunk. Discussed ABCDEs of melanoma and sunscreen.   Multiple lentigos on arms, legs and trunk. Advised benign, no treatment needed.  Multiple scattered angiomas. Advised benign, no treatment needed.   Seborrheic keratosis on arms, legs and trunk. Advised benign, no treatment needed.  History of melanoma - continue Q 3 month FSE. No lymphadenopathy palpated today. Pt is doing this regularly at home.   History of rheumatoid arthritis not on biologics due to risk of immune suppression and melanoma risk. Is not seeing her rheumatologist but is having considerable joint pain. Seeing an ortho for possible joint injection.          Follow-up: Q 6 month FSE/PRN sooner    1.) Patient was asked about new and changing moles. YES  2.) Patient received a complete physical skin examination: YES  3.) Patient was counseled to perform a monthly self skin examination: YES  Scribed By: Misty Pro, MS, PA-C          Again, thank you for allowing me to participate in the care of your patient.         Sincerely,        Misty Hall PA-C

## 2019-04-04 NOTE — PROGRESS NOTES
HPI:   Leanne Zuñiga is a 68 year old female who presents for Full skin cancer screening.  chief complaint  Last Skin Exam: about 3 months ago    1st Baseline: no  Personal HX of Skin Cancer: Yes , both MIS and most recently BCC on the left ala 9/2018  Personal HX of Malignant Melanoma: Yes MIS on the left shoulder 2018  Family HX of Skin Cancer / Malignant Melanoma: yes mother with melanoma; dec'd from this. Ocular melanoma.   Personal HX of Atypical Moles:   no  Risk factors: sun exposure; some tanning bed use  New / Changing lesions: No  Social History: lives near [place]. She recently stopped Orencia due to increased risk for malignancy. Her RA is flaring.  Intermittent prednisone use.  Seeing ortho surgeon in 2 days, hoping for injection. 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  See patient intake sheet.  ROS of the following were done and are negative: Constitutional, Eyes, Ears, Nose,   Mouth, Throat, Cardiovascular, Respiratory, GI, Genitourinary, Musculoskeletal,   Psychiatric, Endocrine, Allergic/Immunologic.       PHYSICAL EXAM:   /86   Pulse 60   SpO2 99%   Skin exam performed as follows: Type 2 skin. Mood appropriate  Alert and Oriented X 3. Well developed, well nourished in no distress.  General appearance: Normal  Head including face: Normal  Eyes: conjunctiva and lids: Normal  Mouth: Lips, teeth, gums: Normal  Neck: Normal  Chest-breast/axillae: Normal  Back: Normal  Spleen and liver: Normal  Cardiovascular: Exam of peripheral vascular system by observation for swelling, varicosities, edema: Normal  Genitalia: groin, buttocks: Normal  Extremities: digits/nails (clubbing): Normal  Eccrine and Apocrine glands: Normal  Right upper extremity: Normal  Left upper extremity: Normal  Right lower extremity: Normal  Left lower extremity: Normal  Skin: Scalp and body hair: See below    Pt  deferred exam of breasts, groin, buttocks: No    Other physical findings:  1. Multiple pigmented macules on extremities and trunk  2. Multiple pigmented macules on face, trunk and extremities  3. Multiple vascular papules on trunk, arms and legs  4. Multiple scattered keratotic plaques     Except as noted above, no other signs of skin cancer or melanoma.     ASSESSMENT/PLAN:   Benign Full skin cancer screening today. Patient with history of malignant melanoma 3/2018 depth 0.4 mm; BCC 9/2018; fhx of melanoma (ocular melanoma) in mother  Advised on monthly self exams and 3 months  Patient Education: Appropriate brochures given.    Multiple benign appearing nevi on arms, legs and trunk. Discussed ABCDEs of melanoma and sunscreen.   Multiple lentigos on arms, legs and trunk. Advised benign, no treatment needed.  Multiple scattered angiomas. Advised benign, no treatment needed.   Seborrheic keratosis on arms, legs and trunk. Advised benign, no treatment needed.  History of melanoma - continue Q 3 month FSE. No lymphadenopathy palpated today. Pt is doing this regularly at home.   History of rheumatoid arthritis not on biologics due to risk of immune suppression and melanoma risk. Is not seeing her rheumatologist but is having considerable joint pain. Seeing an ortho for possible joint injection.          Follow-up: Q 6 month FSE/PRN sooner    1.) Patient was asked about new and changing moles. YES  2.) Patient received a complete physical skin examination: YES  3.) Patient was counseled to perform a monthly self skin examination: YES  Scribed By: Misty Pro, MS, PARADHA

## 2019-04-04 NOTE — NURSING NOTE
Chief Complaint   Patient presents with     Skin Check       Vitals:    04/04/19 1123   BP: 127/86   Pulse: 60   SpO2: 99%     Wt Readings from Last 1 Encounters:   12/14/17 66.2 kg (146 lb)       Leora Chun LPN.................4/4/2019

## 2019-10-03 ENCOUNTER — OFFICE VISIT (OUTPATIENT)
Dept: DERMATOLOGY | Facility: CLINIC | Age: 69
End: 2019-10-03
Payer: MEDICARE

## 2019-10-03 VITALS — DIASTOLIC BLOOD PRESSURE: 85 MMHG | OXYGEN SATURATION: 99 % | SYSTOLIC BLOOD PRESSURE: 122 MMHG | HEART RATE: 78 BPM

## 2019-10-03 DIAGNOSIS — D22.9 NEVUS: ICD-10-CM

## 2019-10-03 DIAGNOSIS — D48.5 NEOPLASM OF UNCERTAIN BEHAVIOR OF SKIN: Primary | ICD-10-CM

## 2019-10-03 DIAGNOSIS — L81.4 LENTIGO: ICD-10-CM

## 2019-10-03 DIAGNOSIS — Z85.828 HISTORY OF BASAL CELL CARCINOMA (BCC) OF SKIN: ICD-10-CM

## 2019-10-03 DIAGNOSIS — D18.01 ANGIOMA OF SKIN: ICD-10-CM

## 2019-10-03 DIAGNOSIS — L82.1 SEBORRHEIC KERATOSIS: ICD-10-CM

## 2019-10-03 DIAGNOSIS — Z85.820 HISTORY OF MELANOMA: ICD-10-CM

## 2019-10-03 PROCEDURE — 88305 TISSUE EXAM BY PATHOLOGIST: CPT | Mod: TC | Performed by: PHYSICIAN ASSISTANT

## 2019-10-03 PROCEDURE — 11102 TANGNTL BX SKIN SINGLE LES: CPT | Performed by: PHYSICIAN ASSISTANT

## 2019-10-03 PROCEDURE — 99214 OFFICE O/P EST MOD 30 MIN: CPT | Mod: 25 | Performed by: PHYSICIAN ASSISTANT

## 2019-10-03 NOTE — PROGRESS NOTES
HPI:   CC: Leanne Zuñiga is a 68 year old female who presents for Full skin cancer screening to rule out skin cancer  Last Skin Exam: about 3 months ago    1st Baseline: no  Personal HX of Skin Cancer: Yes , both MIS and most recently BCC on the left ala 9/2018  Personal HX of Malignant Melanoma: Yes melanoma on the left shoulder 2018  Family HX of Skin Cancer / Malignant Melanoma: yes mother with melanoma; dec'd from this. Ocular melanoma.   Personal HX of Atypical Moles:   no  Risk factors: sun exposure; some tanning bed use  New / Changing lesions: No  Social History: lives near [place]. Going to [place] the winter - first time. Was on Orencia for RA, but stopped due to increased risk for malignancy. 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  See patient intake sheet.  ROS of the following were done and are negative: Constitutional, Eyes, Ears, Nose,   Mouth, Throat, Cardiovascular, Respiratory, GI, Genitourinary, Musculoskeletal,   Psychiatric, Endocrine, Allergic/Immunologic.       PHYSICAL EXAM:   /85   Pulse 78   SpO2 99%   Skin exam performed as follows: Type 2 skin. Mood appropriate  Alert and Oriented X 3. Well developed, well nourished in no distress.  General appearance: Normal  Head including face: Normal  Eyes: conjunctiva and lids: Normal  Mouth: Lips, teeth, gums: Normal  Neck: Normal  Chest-breast/axillae: Normal  Back: Normal  Spleen and liver: Normal  Cardiovascular: Exam of peripheral vascular system by observation for swelling, varicosities, edema: Normal  Genitalia: groin, buttocks: Normal  Extremities: digits/nails (clubbing): Normal  Eccrine and Apocrine glands: Normal  Right upper extremity: Normal  Left upper extremity: Normal  Right lower extremity: Normal  Left lower extremity: Normal  Skin: Scalp and body hair: See below    Pt deferred exam of breasts,  groin, buttocks: No    Other physical findings:  1. Multiple pigmented macules on extremities and trunk  2. Multiple pigmented macules on face, trunk and extremities  3. Multiple vascular papules on trunk, arms and legs  4. Multiple scattered keratotic plaques  5. 5 mm pink scaly papule on the left upper arm      Except as noted above, no other signs of skin cancer or melanoma.     ASSESSMENT/PLAN:   Benign Full skin cancer screening today. Patient with history of malignant melanoma 3/2018 depth 0.4 mm; BCC 9/2018; fhx of melanoma (ocular melanoma) in mother  Advised on monthly self exams and 3 months  Patient Education: Appropriate brochures given.    Multiple benign appearing nevi on arms, legs and trunk. Discussed ABCDEs of melanoma and sunscreen.   Multiple lentigos on arms, legs and trunk. Advised benign, no treatment needed.  Multiple scattered angiomas. Advised benign, no treatment needed.   Seborrheic keratosis on arms, legs and trunk. Advised benign, no treatment needed.  R/o SCC on the left upper arm. Shave biopsy performed.  Area cleaned with betadyne and anesthetized with 1% lidocaine with epi .  Dermablade used to remove the lesion and sent to pathology. Bleeding was cauterized. Pt tolerated procedure well.  History of melanoma - continue Q 3 month FSE. No lymphadenopathy palpated today. Pt is doing this regularly at home.           Follow-up: Q 6 month FSE/PRN sooner    1.) Patient was asked about new and changing moles. YES  2.) Patient received a complete physical skin examination: YES  3.) Patient was counseled to perform a monthly self skin examination: YES  Scribed By: Misty Pro, MS, PARADHA

## 2019-10-03 NOTE — PATIENT INSTRUCTIONS
Wound Care Instructions     FOR SUPERFICIAL WOUNDS     [facility] [phone]    [facility] [phone]                       AFTER 24 HOURS YOU SHOULD REMOVE THE BANDAGE AND BEGIN DAILY DRESSING CHANGES AS FOLLOWS:     1) Remove Dressing.     2) Clean and dry the area with tap water using a Q-tip or sterile gauze pad.     3) Apply Vaseline, Aquaphor, Polysporin ointment or Bacitracin ointment over entire wound.  Do NOT use Neosporin ointment.     4) Cover the wound with a band-aid, or a sterile non-stick gauze pad and micropore paper tape      REPEAT THESE INSTRUCTIONS AT LEAST ONCE A DAY UNTIL THE WOUND HAS COMPLETELY HEALED.    It is an old wives tale that a wound heals better when it is exposed to air and allowed to dry out. The wound will heal faster with a better cosmetic result if it is kept moist with ointment and covered with a bandage.    **Do not let the wound dry out.**      Supplies Needed:      *Cotton tipped applicators (Q-tips)    *Polysporin Ointment or Bacitracin Ointment (NOT NEOSPORIN)    *Band-aids or non-stick gauze pads and micropore paper tape.      PATIENT INFORMATION:    During the healing process you will notice a number of changes. All wounds develop a small halo of redness surrounding the wound.  This means healing is occurring. Severe itching with extensive redness usually indicates sensitivity to the ointment or bandage tape used to dress the wound.  You should call our office if this develops.      Swelling  and/or discoloration around your surgical site is common, particularly when performed around the eye.    All wounds normally drain.  The larger the wound the more drainage there will be.  After 7-10 days, you will notice the wound beginning to shrink and new skin will begin to grow.  The wound is healed when you can see skin has formed over the entire area.  A healed wound has a healthy, shiny look to the surface and is red to dark pink in color  to normalize.  Wounds may take approximately 4-6 weeks to heal.  Larger wounds may take 6-8 weeks.  After the wound is healed you may discontinue dressing changes.    You may experience a sensation of tightness as your wound heals. This is normal and will gradually subside.    Your healed wound may be sensitive to temperature changes. This sensitivity improves with time, but if you re having a lot of discomfort, try to avoid temperature extremes.    Patients frequently experience itching after their wound appears to have healed because of the continue healing under the skin.  Plain Vaseline will help relieve the itching.        POSSIBLE COMPLICATIONS    BLEEDIN. Leave the bandage in place.  2. Use tightly rolled up gauze or a cloth to apply direct pressure over the bandage for 30  minutes.  3. Reapply pressure for an additional 30 minutes if necessary  4. Use additional gauze and tape to maintain pressure once the bleeding has stopped.

## 2019-10-03 NOTE — LETTER
10/3/2019         RE: Leanne Zuñiga  [address] [address]        Dear Colleague,    Thank you for referring your patient, Leanne Zuñiga, to the [facility]. Please see a copy of my visit note below.    HPI:   CC: Leanne Zuñiga is a 68 year old female who presents for Full skin cancer screening to rule out skin cancer  Last Skin Exam: about 3 months ago    1st Baseline: no  Personal HX of Skin Cancer: Yes , both MIS and most recently BCC on the left ala 9/2018  Personal HX of Malignant Melanoma: Yes melanoma on the left shoulder 2018  Family HX of Skin Cancer / Malignant Melanoma: yes mother with melanoma; dec'd from this. Ocular melanoma.   Personal HX of Atypical Moles:   no  Risk factors: sun exposure; some tanning bed use  New / Changing lesions: No  Social History: lives near [place]. Going to [place] (just south or [place]) for the winter - first time. Was on Orencia for RA, but stopped due to increased risk for malignancy. 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  See patient intake sheet.  ROS of the following were done and are negative: Constitutional, Eyes, Ears, Nose,   Mouth, Throat, Cardiovascular, Respiratory, GI, Genitourinary, Musculoskeletal,   Psychiatric, Endocrine, Allergic/Immunologic.       PHYSICAL EXAM:   /85   Pulse 78   SpO2 99%   Skin exam performed as follows: Type 2 skin. Mood appropriate  Alert and Oriented X 3. Well developed, well nourished in no distress.  General appearance: Normal  Head including face: Normal  Eyes: conjunctiva and lids: Normal  Mouth: Lips, teeth, gums: Normal  Neck: Normal  Chest-breast/axillae: Normal  Back: Normal  Spleen and liver: Normal  Cardiovascular: Exam of peripheral vascular system by observation for swelling, varicosities, edema: Normal  Genitalia: groin, buttocks: Normal  Extremities: digits/nails  (clubbing): Normal  Eccrine and Apocrine glands: Normal  Right upper extremity: Normal  Left upper extremity: Normal  Right lower extremity: Normal  Left lower extremity: Normal  Skin: Scalp and body hair: See below    Pt deferred exam of breasts, groin, buttocks: No    Other physical findings:  1. Multiple pigmented macules on extremities and trunk  2. Multiple pigmented macules on face, trunk and extremities  3. Multiple vascular papules on trunk, arms and legs  4. Multiple scattered keratotic plaques  5. 5 mm pink scaly papule on the left upper arm      Except as noted above, no other signs of skin cancer or melanoma.     ASSESSMENT/PLAN:   Benign Full skin cancer screening today. Patient with history of malignant melanoma 3/2018 depth 0.4 mm; BCC 9/2018; fhx of melanoma (ocular melanoma) in mother  Advised on monthly self exams and 3 months  Patient Education: Appropriate brochures given.    Multiple benign appearing nevi on arms, legs and trunk. Discussed ABCDEs of melanoma and sunscreen.   Multiple lentigos on arms, legs and trunk. Advised benign, no treatment needed.  Multiple scattered angiomas. Advised benign, no treatment needed.   Seborrheic keratosis on arms, legs and trunk. Advised benign, no treatment needed.  R/o SCC on the left upper arm. Shave biopsy performed.  Area cleaned with betadyne and anesthetized with 1% lidocaine with epi .  Dermablade used to remove the lesion and sent to pathology. Bleeding was cauterized. Pt tolerated procedure well.  History of melanoma - continue Q 3 month FSE. No lymphadenopathy palpated today. Pt is doing this regularly at home.           Follow-up: Q 6 month FSE/PRN sooner    1.) Patient was asked about new and changing moles. YES  2.) Patient received a complete physical skin examination: YES  3.) Patient was counseled to perform a monthly self skin examination: YES  Scribed By: Misty Pro, MS, PA-C          Again, thank you for allowing me to participate in  the care of your patient.        Sincerely,        Misty Hall PA-C

## 2019-10-08 LAB — COPATH REPORT: NORMAL

## 2020-07-09 ENCOUNTER — OFFICE VISIT (OUTPATIENT)
Dept: DERMATOLOGY | Facility: CLINIC | Age: 70
End: 2020-07-09
Payer: MEDICARE

## 2020-07-09 VITALS — SYSTOLIC BLOOD PRESSURE: 109 MMHG | HEART RATE: 60 BPM | OXYGEN SATURATION: 99 % | DIASTOLIC BLOOD PRESSURE: 68 MMHG

## 2020-07-09 DIAGNOSIS — L82.1 SEBORRHEIC KERATOSIS: ICD-10-CM

## 2020-07-09 DIAGNOSIS — D22.9 NEVUS: Primary | ICD-10-CM

## 2020-07-09 DIAGNOSIS — Z85.820 HISTORY OF MELANOMA: ICD-10-CM

## 2020-07-09 DIAGNOSIS — L81.4 LENTIGO: ICD-10-CM

## 2020-07-09 DIAGNOSIS — D18.01 ANGIOMA OF SKIN: ICD-10-CM

## 2020-07-09 PROCEDURE — 99214 OFFICE O/P EST MOD 30 MIN: CPT | Performed by: PHYSICIAN ASSISTANT

## 2020-07-09 RX ORDER — OXYCODONE HYDROCHLORIDE 5 MG/1
5 TABLET ORAL
Status: ON HOLD | COMMUNITY
Start: 2020-07-03 | End: 2023-04-28

## 2020-07-09 NOTE — LETTER
7/9/2020         RE: Leanne Zuñiga  [address] [facility] [address]        Dear Colleague,    Thank you for referring your patient, Leanne Zuñiga, to the [facility]. Please see a copy of my visit note below.    HPI:   CC: Leanne Zuñiga is a 69 year old female who presents for Full skin cancer screening to rule out skin cancer  Last Skin Exam: about 3 months ago    1st Baseline: no  Personal HX of Skin Cancer: Yes , both MIS and most recently BCC on the left ala 9/2018  Personal HX of Malignant Melanoma: Yes melanoma on the left shoulder 2018  Family HX of Skin Cancer / Malignant Melanoma: yes mother with melanoma; dec'd from this. Ocular melanoma.   Personal HX of Atypical Moles:   no  Risk factors: sun exposure; some tanning bed use  New / Changing lesions: No  Social History: lives near [place].  Was on Orencia for RA, but stopped due to increased risk for malignancy. 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  See patient intake sheet.  ROS of the following were done and are negative: Constitutional, Eyes, Ears, Nose,   Mouth, Throat, Cardiovascular, Respiratory, GI, Genitourinary, Musculoskeletal,   Psychiatric, Endocrine, Allergic/Immunologic.    HPI, past medical history, social history, allergies, medications reviewed as of July 9, 2020       PHYSICAL EXAM:   /68   Pulse 60   SpO2 99%   Skin exam performed as follows: Type 2 skin. Mood appropriate  Alert and Oriented X 3. Well developed, well nourished in no distress.  General appearance: Normal  Head including face: Normal  Eyes: conjunctiva and lids: Normal  Mouth: Lips, teeth, gums: Normal  Neck: Normal  Chest-breast/axillae: Normal  Back: Normal  Spleen and liver: Normal  Cardiovascular: Exam of peripheral vascular system by observation for swelling, varicosities, edema: Normal  Genitalia: groin, buttocks:  Normal  Extremities: digits/nails (clubbing): Normal  Eccrine and Apocrine glands: Normal  Right upper extremity: Normal  Left upper extremity: Normal  Right lower extremity: Normal  Left lower extremity: Normal  Skin: Scalp and body hair: See below    Pt deferred exam of breasts, groin, buttocks: No    Other physical findings:  1. Multiple pigmented macules on extremities and trunk  2. Multiple pigmented macules on face, trunk and extremities  3. Multiple vascular papules on trunk, arms and legs  4. Multiple scattered keratotic plaques       Except as noted above, no other signs of skin cancer or melanoma.     ASSESSMENT/PLAN:   Benign Full skin cancer screening today. Patient with history of malignant melanoma 3/2018 depth 0.4 mm; BCC 9/2018; fhx of melanoma (ocular melanoma) in mother  Advised on monthly self exams and 3 months  Patient Education: Appropriate brochures given.    Multiple benign appearing nevi on arms, legs and trunk. Discussed ABCDEs of melanoma and sunscreen.   Multiple lentigos on arms, legs and trunk. Advised benign, no treatment needed.  Multiple scattered angiomas. Advised benign, no treatment needed.   Seborrheic keratosis on arms, legs and trunk. Advised benign, no treatment needed.  History of melanoma - continue Q 3 month FSE. No lymphadenopathy palpated today. Pt is doing this regularly at home.           Follow-up: Q 6 month FSE/PRN sooner    1.) Patient was asked about new and changing moles. YES  2.) Patient received a complete physical skin examination: YES  3.) Patient was counseled to perform a monthly self skin examination: YES  Scribed By: Misty Pro, MS, PAJuniorC          Again, thank you for allowing me to participate in the care of your patient.        Sincerely,        Misty Pro PA-C

## 2020-07-09 NOTE — NURSING NOTE
.  Chief Complaint   Patient presents with     Skin Check       Vitals:    07/09/20 1418   BP: 109/68   Pulse: 60   SpO2: 99%     Wt Readings from Last 1 Encounters:   12/14/17 66.2 kg (146 lb)       Leora Chun LPN.................7/9/2020

## 2020-07-10 NOTE — PROGRESS NOTES
HPI:   CC: Leanne Zuñiga is a 69 year old female who presents for Full skin cancer screening to rule out skin cancer  Last Skin Exam: about 3 months ago    1st Baseline: no  Personal HX of Skin Cancer: Yes , both MIS and most recently BCC on the left ala 9/2018  Personal HX of Malignant Melanoma: Yes melanoma on the left shoulder 2018  Family HX of Skin Cancer / Malignant Melanoma: yes mother with melanoma; dec'd from this. Ocular melanoma.   Personal HX of Atypical Moles:   no  Risk factors: sun exposure; some tanning bed use  New / Changing lesions: No  Social History: lives near [place].  Was on Orencia for RA, but stopped due to increased risk for malignancy. 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  See patient intake sheet.  ROS of the following were done and are negative: Constitutional, Eyes, Ears, Nose,   Mouth, Throat, Cardiovascular, Respiratory, GI, Genitourinary, Musculoskeletal,   Psychiatric, Endocrine, Allergic/Immunologic.    HPI, past medical history, social history, allergies, medications reviewed as of July 9, 2020       PHYSICAL EXAM:   /68   Pulse 60   SpO2 99%   Skin exam performed as follows: Type 2 skin. Mood appropriate  Alert and Oriented X 3. Well developed, well nourished in no distress.  General appearance: Normal  Head including face: Normal  Eyes: conjunctiva and lids: Normal  Mouth: Lips, teeth, gums: Normal  Neck: Normal  Chest-breast/axillae: Normal  Back: Normal  Spleen and liver: Normal  Cardiovascular: Exam of peripheral vascular system by observation for swelling, varicosities, edema: Normal  Genitalia: groin, buttocks: Normal  Extremities: digits/nails (clubbing): Normal  Eccrine and Apocrine glands: Normal  Right upper extremity: Normal  Left upper extremity: Normal  Right lower extremity: Normal  Left lower extremity: Normal  Skin: Scalp and body hair: See  below    Pt deferred exam of breasts, groin, buttocks: No    Other physical findings:  1. Multiple pigmented macules on extremities and trunk  2. Multiple pigmented macules on face, trunk and extremities  3. Multiple vascular papules on trunk, arms and legs  4. Multiple scattered keratotic plaques       Except as noted above, no other signs of skin cancer or melanoma.     ASSESSMENT/PLAN:   Benign Full skin cancer screening today. Patient with history of malignant melanoma 3/2018 depth 0.4 mm; BCC 9/2018; fhx of melanoma (ocular melanoma) in mother  Advised on monthly self exams and 3 months  Patient Education: Appropriate brochures given.    Multiple benign appearing nevi on arms, legs and trunk. Discussed ABCDEs of melanoma and sunscreen.   Multiple lentigos on arms, legs and trunk. Advised benign, no treatment needed.  Multiple scattered angiomas. Advised benign, no treatment needed.   Seborrheic keratosis on arms, legs and trunk. Advised benign, no treatment needed.  History of melanoma - continue Q 3 month FSE. No lymphadenopathy palpated today. Pt is doing this regularly at home.           Follow-up: Q 6 month FSE/PRN sooner    1.) Patient was asked about new and changing moles. YES  2.) Patient received a complete physical skin examination: YES  3.) Patient was counseled to perform a monthly self skin examination: YES  Scribed By: Misty Pro, MS, PARADHA

## 2021-04-15 ENCOUNTER — OFFICE VISIT (OUTPATIENT)
Dept: DERMATOLOGY | Facility: CLINIC | Age: 71
End: 2021-04-15
Payer: MEDICARE

## 2021-04-15 VITALS — DIASTOLIC BLOOD PRESSURE: 71 MMHG | SYSTOLIC BLOOD PRESSURE: 119 MMHG | OXYGEN SATURATION: 98 % | HEART RATE: 70 BPM

## 2021-04-15 DIAGNOSIS — Z85.820 HISTORY OF MELANOMA: ICD-10-CM

## 2021-04-15 DIAGNOSIS — B35.3 TINEA PEDIS OF RIGHT FOOT: ICD-10-CM

## 2021-04-15 DIAGNOSIS — L81.4 LENTIGO: ICD-10-CM

## 2021-04-15 DIAGNOSIS — D22.9 NEVUS: Primary | ICD-10-CM

## 2021-04-15 DIAGNOSIS — Z85.828 HISTORY OF BASAL CELL CARCINOMA (BCC) OF SKIN: ICD-10-CM

## 2021-04-15 DIAGNOSIS — D18.01 ANGIOMA OF SKIN: ICD-10-CM

## 2021-04-15 DIAGNOSIS — L82.1 SEBORRHEIC KERATOSIS: ICD-10-CM

## 2021-04-15 PROCEDURE — 99214 OFFICE O/P EST MOD 30 MIN: CPT | Performed by: PHYSICIAN ASSISTANT

## 2021-04-15 RX ORDER — KETOCONAZOLE 20 MG/G
CREAM TOPICAL
Qty: 30 G | Refills: 3 | Status: SHIPPED | OUTPATIENT
Start: 2021-04-15

## 2021-04-15 NOTE — ADDENDUM NOTE
Addended by: JUANCARLOS SUBRAMANIAN on: 4/15/2021 02:36 PM     Modules accepted: Orders, Level of Service

## 2021-04-15 NOTE — PROGRESS NOTES
HPI:   CC: Leanne Zuñiga is a 70 year old female who presents for Full skin cancer screening to rule out skin cancer  Last Skin Exam: about 3 months ago    1st Baseline: no  Personal HX of Skin Cancer: Yes , both MIS and most recently BCC on the left ala 9/2018  Personal HX of Malignant Melanoma: Yes melanoma on the left shoulder 2018  Family HX of Skin Cancer / Malignant Melanoma: yes mother with melanoma; dec'd from this. Ocular melanoma.   Personal HX of Atypical Moles:   no  Risk factors: sun exposure; some tanning bed use  New / Changing lesions: No  Social History: lives near [place].  Was on Orencia for RA, but stopped due to increased risk for malignancy. 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  See patient intake sheet.  ROS of the following were done and are negative: Constitutional, Eyes, Ears, Nose,   Mouth, Throat, Cardiovascular, Respiratory, GI, Genitourinary, Musculoskeletal,   Psychiatric, Endocrine, Allergic/Immunologic.       PHYSICAL EXAM:   /71 (BP Location: Right arm, Patient Position: Sitting, Cuff Size: Adult Regular)   Pulse 70   SpO2 98%   Skin exam performed as follows: Type 2 skin. Mood appropriate  Alert and Oriented X 3. Well developed, well nourished in no distress.  General appearance: Normal  Head including face: Normal  Eyes: conjunctiva and lids: Normal  Mouth: Lips, teeth, gums: Normal  Neck: Normal  Chest-breast/axillae: Normal  Back: Normal  Spleen and liver: Normal  Cardiovascular: Exam of peripheral vascular system by observation for swelling, varicosities, edema: Normal  Genitalia: groin, buttocks: Normal  Extremities: digits/nails (clubbing): Normal  Eccrine and Apocrine glands: Normal  Right upper extremity: Normal  Left upper extremity: Normal  Right lower extremity: Normal  Left lower extremity: Normal  Skin: Scalp and body hair: See below    Pt deferred  exam of breasts, groin, buttocks: No    Other physical findings:  1. Multiple pigmented macules on extremities and trunk  2. Multiple pigmented macules on face, trunk and extremities  3. Multiple vascular papules on trunk, arms and legs  4. Multiple scattered keratotic plaques  5. Maceration on the 1st webspace       Except as noted above, no other signs of skin cancer or melanoma.     ASSESSMENT/PLAN:   Benign Full skin cancer screening today. Patient with history of malignant melanoma 3/2018 depth 0.4 mm; BCC 9/2018; fhx of melanoma (ocular melanoma) in mother  Advised on monthly self exams and 3 months  Patient Education: Appropriate brochures given.    Multiple benign appearing nevi on arms, legs and trunk. Discussed ABCDEs of melanoma and sunscreen.   Multiple lentigos on arms, legs and trunk. Advised benign, no treatment needed.  Multiple scattered angiomas. Advised benign, no treatment needed.   Seborrheic keratosis on arms, legs and trunk. Advised benign, no treatment needed.  Favor tinea pedis on the 1st web space - start ketoconazole cream BID x 14 days  History of melanoma - continue Q 3 month FSE. No lymphadenopathy palpated today. Pt is doing this regularly at home.           Follow-up: Q 6 month FSE/PRN sooner    1.) Patient was asked about new and changing moles. YES  2.) Patient received a complete physical skin examination: YES  3.) Patient was counseled to perform a monthly self skin examination: YES  Scribed By: Misty Pro, MS, PARADHA

## 2021-04-15 NOTE — LETTER
4/15/2021         RE: Leanne Zuñiga  [address] [facility] [address]        Dear Colleague,    Thank you for referring your patient, Leanne Zuñiga, to the [facility]. Please see a copy of my visit note below.    HPI:   CC: Leanne Zuñiga is a 70 year old female who presents for Full skin cancer screening to rule out skin cancer  Last Skin Exam: about 3 months ago    1st Baseline: no  Personal HX of Skin Cancer: Yes , both MIS and most recently BCC on the left ala 9/2018  Personal HX of Malignant Melanoma: Yes melanoma on the left shoulder 2018  Family HX of Skin Cancer / Malignant Melanoma: yes mother with melanoma; dec'd from this. Ocular melanoma.   Personal HX of Atypical Moles:   no  Risk factors: sun exposure; some tanning bed use  New / Changing lesions: No  Social History: lives near [place].  Was on Orencia for RA, but stopped due to increased risk for malignancy. 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  See patient intake sheet.  ROS of the following were done and are negative: Constitutional, Eyes, Ears, Nose,   Mouth, Throat, Cardiovascular, Respiratory, GI, Genitourinary, Musculoskeletal,   Psychiatric, Endocrine, Allergic/Immunologic.       PHYSICAL EXAM:   /71 (BP Location: Right arm, Patient Position: Sitting, Cuff Size: Adult Regular)   Pulse 70   SpO2 98%   Skin exam performed as follows: Type 2 skin. Mood appropriate  Alert and Oriented X 3. Well developed, well nourished in no distress.  General appearance: Normal  Head including face: Normal  Eyes: conjunctiva and lids: Normal  Mouth: Lips, teeth, gums: Normal  Neck: Normal  Chest-breast/axillae: Normal  Back: Normal  Spleen and liver: Normal  Cardiovascular: Exam of peripheral vascular system by observation for swelling, varicosities, edema: Normal  Genitalia: groin, buttocks:  Normal  Extremities: digits/nails (clubbing): Normal  Eccrine and Apocrine glands: Normal  Right upper extremity: Normal  Left upper extremity: Normal  Right lower extremity: Normal  Left lower extremity: Normal  Skin: Scalp and body hair: See below    Pt deferred exam of breasts, groin, buttocks: No    Other physical findings:  1. Multiple pigmented macules on extremities and trunk  2. Multiple pigmented macules on face, trunk and extremities  3. Multiple vascular papules on trunk, arms and legs  4. Multiple scattered keratotic plaques       Except as noted above, no other signs of skin cancer or melanoma.     ASSESSMENT/PLAN:   Benign Full skin cancer screening today. Patient with history of malignant melanoma 3/2018 depth 0.4 mm; BCC 9/2018; fhx of melanoma (ocular melanoma) in mother  Advised on monthly self exams and 3 months  Patient Education: Appropriate brochures given.    Multiple benign appearing nevi on arms, legs and trunk. Discussed ABCDEs of melanoma and sunscreen.   Multiple lentigos on arms, legs and trunk. Advised benign, no treatment needed.  Multiple scattered angiomas. Advised benign, no treatment needed.   Seborrheic keratosis on arms, legs and trunk. Advised benign, no treatment needed.  History of melanoma - continue Q 3 month FSE. No lymphadenopathy palpated today. Pt is doing this regularly at home.           Follow-up: Q 6 month FSE/PRN sooner    1.) Patient was asked about new and changing moles. YES  2.) Patient received a complete physical skin examination: YES  3.) Patient was counseled to perform a monthly self skin examination: YES  Scribed By: Misty Pro, MS, PAJuniorC            Again, thank you for allowing me to participate in the care of your patient.        Sincerely,        Misty Pro PA-C

## 2021-04-15 NOTE — NURSING NOTE
"Initial /71 (BP Location: Right arm, Patient Position: Sitting, Cuff Size: Adult Regular)   Pulse 70   SpO2 98%  Estimated body mass index is 22.2 kg/m  as calculated from the following:    Height as of 12/17/18: 1.727 m (5' 8\").    Weight as of 12/14/17: 66.2 kg (146 lb). .      "

## 2022-04-19 ENCOUNTER — TRANSCRIBE ORDERS (OUTPATIENT)
Dept: MULTI SPECIALTY CLINIC | Facility: CLINIC | Age: 72
End: 2022-04-19
Payer: MEDICARE

## 2022-04-19 ENCOUNTER — MEDICAL CORRESPONDENCE (OUTPATIENT)
Dept: HEALTH INFORMATION MANAGEMENT | Facility: CLINIC | Age: 72
End: 2022-04-19
Payer: MEDICARE

## 2022-04-19 DIAGNOSIS — M05.741 RHEUMATOID ARTHRITIS INVOLVING BOTH HANDS WITH POSITIVE RHEUMATOID FACTOR (H): Primary | ICD-10-CM

## 2022-04-19 DIAGNOSIS — M05.742 RHEUMATOID ARTHRITIS INVOLVING BOTH HANDS WITH POSITIVE RHEUMATOID FACTOR (H): Primary | ICD-10-CM

## 2022-04-28 ENCOUNTER — OFFICE VISIT (OUTPATIENT)
Dept: DERMATOLOGY | Facility: CLINIC | Age: 72
End: 2022-04-28
Payer: MEDICARE

## 2022-04-28 VITALS — OXYGEN SATURATION: 98 % | DIASTOLIC BLOOD PRESSURE: 71 MMHG | SYSTOLIC BLOOD PRESSURE: 119 MMHG | HEART RATE: 71 BPM

## 2022-04-28 DIAGNOSIS — D22.9 NEVUS: Primary | ICD-10-CM

## 2022-04-28 DIAGNOSIS — Z85.828 HISTORY OF BASAL CELL CARCINOMA (BCC): ICD-10-CM

## 2022-04-28 DIAGNOSIS — D18.01 ANGIOMA OF SKIN: ICD-10-CM

## 2022-04-28 DIAGNOSIS — D48.5 NEOPLASM OF UNCERTAIN BEHAVIOR OF SKIN: ICD-10-CM

## 2022-04-28 DIAGNOSIS — Z85.820 HISTORY OF MELANOMA: ICD-10-CM

## 2022-04-28 DIAGNOSIS — L82.1 SEBORRHEIC KERATOSIS: ICD-10-CM

## 2022-04-28 DIAGNOSIS — L81.4 LENTIGO: ICD-10-CM

## 2022-04-28 DIAGNOSIS — Z80.8 FAMILY HISTORY OF MELANOMA: ICD-10-CM

## 2022-04-28 PROCEDURE — 88305 TISSUE EXAM BY PATHOLOGIST: CPT | Performed by: DERMATOLOGY

## 2022-04-28 PROCEDURE — 99213 OFFICE O/P EST LOW 20 MIN: CPT | Mod: 25 | Performed by: PHYSICIAN ASSISTANT

## 2022-04-28 PROCEDURE — 11102 TANGNTL BX SKIN SINGLE LES: CPT | Performed by: PHYSICIAN ASSISTANT

## 2022-04-28 NOTE — PROGRESS NOTES
HPI:   CC: Leanne Zuñiga is a 71 year old female who presents for Full skin cancer screening to rule out skin cancer  Last Skin Exam: 1 year ago  1st Baseline: no  Personal HX of Skin Cancer: Yes , both MIS and most recently BCC on the left ala 9/2018  Personal HX of Malignant Melanoma: Yes melanoma on the left shoulder 2018  Family HX of Skin Cancer / Malignant Melanoma: yes mother with melanoma; dec'd from this. Ocular melanoma.   Personal HX of Atypical Moles:   no  Risk factors: sun exposure; some tanning bed use  New / Changing lesions: No  Social History: lives near [place].  Was on Orencia for RA, but stopped due to increased risk for malignancy. Had positive TB test and was dxd with latent TB. Recently had a disc injury which lead to permanent foot drop on the right.   On review of systems, there are no further skin complaints, patient is feeling otherwise well.  See patient intake sheet.  ROS of the following were done and are negative: Constitutional, Eyes, Ears, Nose,   Mouth, Throat, Cardiovascular, Respiratory, GI, Genitourinary, Musculoskeletal,   Psychiatric, Endocrine, Allergic/Immunologic.       PHYSICAL EXAM:   /71 (BP Location: Right arm, Patient Position: Sitting)   Pulse 71   SpO2 98%   Skin exam performed as follows: Type 2 skin. Mood appropriate  Alert and Oriented X 3. Well developed, well nourished in no distress.  General appearance: Normal  Head including face: Normal  Eyes: conjunctiva and lids: Normal  Mouth: Lips, teeth, gums: Normal  Neck: Normal  Chest-breast/axillae: Normal  Back: Normal  Spleen and liver: Normal  Cardiovascular: Exam of peripheral vascular system by observation for swelling, varicosities, edema: Normal  Genitalia: groin, buttocks: Normal  Extremities: digits/nails (clubbing): Normal  Eccrine and Apocrine glands: Normal  Right upper extremity: Normal  Left upper extremity: Normal  Right lower extremity: Normal  Left lower extremity: Normal  Skin: Scalp  and body hair: See below    Pt deferred exam of breasts, groin, buttocks: No    Other physical findings:  1. Multiple pigmented macules on extremities and trunk  2. Multiple pigmented macules on face, trunk and extremities  3. Multiple vascular papules on trunk, arms and legs  4. Multiple scattered keratotic plaques  5. Tan papule 5 mm on the left temple       Except as noted above, no other signs of skin cancer or melanoma.     ASSESSMENT/PLAN:   Benign Full skin cancer screening today. Patient with history of malignant melanoma 3/2018 depth 0.4 mm; BCC 9/2018; fhx of melanoma (ocular melanoma) in mother  Advised on monthly self exams and 3 months  Patient Education: Appropriate brochures given.    Multiple benign appearing nevi on arms, legs and trunk. Discussed ABCDEs of melanoma and sunscreen.   Multiple lentigos on arms, legs and trunk. Advised benign, no treatment needed.  Multiple scattered angiomas. Advised benign, no treatment needed.   Seborrheic keratosis on arms, legs and trunk. Advised benign, no treatment needed.  Sebaceous hyperplasia vs BCC on the left temple. Shave biopsy performed.  Area cleaned and anesthetized with 1% lidocaine with epinephrine.  Dermablade used to remove the lesion and sent to pathology. Bleeding was cauterized. Pt tolerated procedure well with no complications.       Follow-up: Q 6 month FSE/PRN sooner    1.) Patient was asked about new and changing moles. YES  2.) Patient received a complete physical skin examination: YES  3.) Patient was counseled to perform a monthly self skin examination: YES  Scribed By: Misty Pro MS, PARADHA

## 2022-04-28 NOTE — LETTER
4/28/2022         RE: Leanne Zuñiga  [address] [facility] [address]        Dear Colleague,    Thank you for referring your patient, Leanne Zuñiga, to the [facility]. Please see a copy of my visit note below.    HPI:   CC: Leanne Zuñiga is a 71 year old female who presents for Full skin cancer screening to rule out skin cancer  Last Skin Exam: 1 year ago  1st Baseline: no  Personal HX of Skin Cancer: Yes , both MIS and most recently BCC on the left ala 9/2018  Personal HX of Malignant Melanoma: Yes melanoma on the left shoulder 2018  Family HX of Skin Cancer / Malignant Melanoma: yes mother with melanoma; dec'd from this. Ocular melanoma.   Personal HX of Atypical Moles:   no  Risk factors: sun exposure; some tanning bed use  New / Changing lesions: No  Social History: lives near [place].  Was on Orencia for RA, but stopped due to increased risk for malignancy. Had positive TB test and was dxd with latent TB. Recently had a disc injury which lead to permanent foot drop on the right.   On review of systems, there are no further skin complaints, patient is feeling otherwise well.  See patient intake sheet.  ROS of the following were done and are negative: Constitutional, Eyes, Ears, Nose,   Mouth, Throat, Cardiovascular, Respiratory, GI, Genitourinary, Musculoskeletal,   Psychiatric, Endocrine, Allergic/Immunologic.       PHYSICAL EXAM:   /71 (BP Location: Right arm, Patient Position: Sitting)   Pulse 71   SpO2 98%   Skin exam performed as follows: Type 2 skin. Mood appropriate  Alert and Oriented X 3. Well developed, well nourished in no distress.  General appearance: Normal  Head including face: Normal  Eyes: conjunctiva and lids: Normal  Mouth: Lips, teeth, gums: Normal  Neck: Normal  Chest-breast/axillae: Normal  Back: Normal  Spleen and liver: Normal  Cardiovascular: Exam of peripheral vascular system by observation for swelling, varicosities, edema:  Normal  Genitalia: groin, buttocks: Normal  Extremities: digits/nails (clubbing): Normal  Eccrine and Apocrine glands: Normal  Right upper extremity: Normal  Left upper extremity: Normal  Right lower extremity: Normal  Left lower extremity: Normal  Skin: Scalp and body hair: See below    Pt deferred exam of breasts, groin, buttocks: No    Other physical findings:  1. Multiple pigmented macules on extremities and trunk  2. Multiple pigmented macules on face, trunk and extremities  3. Multiple vascular papules on trunk, arms and legs  4. Multiple scattered keratotic plaques  5. Tan papule 5 mm on the left temple       Except as noted above, no other signs of skin cancer or melanoma.     ASSESSMENT/PLAN:   Benign Full skin cancer screening today. Patient with history of malignant melanoma 3/2018 depth 0.4 mm; BCC 9/2018; fhx of melanoma (ocular melanoma) in mother  Advised on monthly self exams and 3 months  Patient Education: Appropriate brochures given.    Multiple benign appearing nevi on arms, legs and trunk. Discussed ABCDEs of melanoma and sunscreen.   Multiple lentigos on arms, legs and trunk. Advised benign, no treatment needed.  Multiple scattered angiomas. Advised benign, no treatment needed.   Seborrheic keratosis on arms, legs and trunk. Advised benign, no treatment needed.  Sebaceous hyperplasia vs BCC on the left temple. Shave biopsy performed.  Area cleaned and anesthetized with 1% lidocaine with epinephrine.  Dermablade used to remove the lesion and sent to pathology. Bleeding was cauterized. Pt tolerated procedure well with no complications.       Follow-up: Q 6 month FSE/PRN sooner    1.) Patient was asked about new and changing moles. YES  2.) Patient received a complete physical skin examination: YES  3.) Patient was counseled to perform a monthly self skin examination: YES  Scribed By: Misty Pro, MS, PA-C            Again, thank you for allowing me to participate in the care of your patient.         Sincerely,        Misty Hall PA-C

## 2022-04-28 NOTE — PATIENT INSTRUCTIONS
Wound Care Instructions     FOR SUPERFICIAL WOUNDS     [facility]  [phone]                 AFTER 24 HOURS YOU SHOULD REMOVE THE BANDAGE AND BEGIN DAILY DRESSING CHANGES AS FOLLOWS:     1) Remove Dressing.     2) Clean and dry the area with tap water using a Q-tip or sterile gauze pad.     3) Apply Vaseline, Aquaphor, Polysporin ointment or Bacitracin ointment over entire wound.  Do NOT use Neosporin ointment.     4) Cover the wound with a band-aid, or a sterile non-stick gauze pad and micropore paper tape    REPEAT THESE INSTRUCTIONS AT LEAST ONCE A DAY UNTIL THE WOUND HAS COMPLETELY HEALED.    It is an old wives tale that a wound heals better when it is exposed to air and allowed to dry out. The wound will heal faster with a better cosmetic result if it is kept moist with ointment and covered with a bandage.    **Do not let the wound dry out.**    Supplies Needed:      *Cotton tipped applicators (Q-tips)    *Vaseline, Aquaphor, Polysporin or Bacitracin Ointment (NOT NEOSPORIN)    *Band-aids or non-stick gauze pads and micropore paper tape.      PATIENT INFORMATION:    During the healing process you will notice a number of changes. All wounds develop a small halo of redness surrounding the wound.  This means healing is occurring. Severe itching with extensive redness usually indicates sensitivity to the ointment or bandage tape used to dress the wound.  You should call our office if this develops.      Swelling  and/or discoloration around your surgical site is common, particularly when performed around the eye.    All wounds normally drain.  The larger the wound the more drainage there will be.  After 7-10 days, you will notice the wound beginning to shrink and new skin will begin to grow.  The wound is healed when you can see skin has formed over the entire area.  A healed wound has a healthy, shiny look to the surface and is red to dark pink in color to normalize.  Wounds may  take approximately 4-6 weeks to heal.  Larger wounds may take 6-8 weeks.  After the wound is healed you may discontinue dressing changes.    You may experience a sensation of tightness as your wound heals. This is normal and will gradually subside.    Your healed wound may be sensitive to temperature changes. This sensitivity improves with time, but if you re having a lot of discomfort, try to avoid temperature extremes.    Patients frequently experience itching after their wound appears to have healed because of the continue healing under the skin.  Plain Vaseline will help relieve the itching.      POSSIBLE COMPLICATIONS    BLEEDING:    Leave the bandage in place.  Use tightly rolled up gauze or a cloth to apply direct pressure over the bandage for 30  minutes.  Reapply pressure for an additional 30 minutes if necessary  Use additional gauze and tape to maintain pressure once the bleeding has stopped.

## 2022-04-28 NOTE — NURSING NOTE
"Initial /71 (BP Location: Right arm, Patient Position: Sitting)   Pulse 71   SpO2 98%  Estimated body mass index is 22.2 kg/m  as calculated from the following:    Height as of 12/17/18: 1.727 m (5' 8\").    Weight as of 12/14/17: 66.2 kg (146 lb). .      "

## 2022-04-30 LAB
PATH REPORT.COMMENTS IMP SPEC: NORMAL
PATH REPORT.COMMENTS IMP SPEC: NORMAL
PATH REPORT.FINAL DX SPEC: NORMAL
PATH REPORT.GROSS SPEC: NORMAL
PATH REPORT.MICROSCOPIC SPEC OTHER STN: NORMAL
PATH REPORT.RELEVANT HX SPEC: NORMAL

## 2022-05-06 ENCOUNTER — TELEPHONE (OUTPATIENT)
Dept: DERMATOLOGY | Facility: CLINIC | Age: 72
End: 2022-05-06
Payer: MEDICARE

## 2022-06-01 ENCOUNTER — TELEPHONE (OUTPATIENT)
Dept: RHEUMATOLOGY | Facility: CLINIC | Age: 72
End: 2022-06-01

## 2022-06-01 ENCOUNTER — OFFICE VISIT (OUTPATIENT)
Dept: RHEUMATOLOGY | Facility: CLINIC | Age: 72
End: 2022-06-01
Payer: MEDICARE

## 2022-06-01 VITALS
BODY MASS INDEX: 22.58 KG/M2 | DIASTOLIC BLOOD PRESSURE: 76 MMHG | SYSTOLIC BLOOD PRESSURE: 120 MMHG | HEIGHT: 68 IN | WEIGHT: 149 LBS

## 2022-06-01 DIAGNOSIS — Z22.7 TB LUNG, LATENT: ICD-10-CM

## 2022-06-01 DIAGNOSIS — Z79.52 ON PREDNISONE THERAPY: ICD-10-CM

## 2022-06-01 DIAGNOSIS — M05.742 RHEUMATOID ARTHRITIS INVOLVING BOTH HANDS WITH POSITIVE RHEUMATOID FACTOR (H): Primary | ICD-10-CM

## 2022-06-01 DIAGNOSIS — M05.741 RHEUMATOID ARTHRITIS INVOLVING BOTH HANDS WITH POSITIVE RHEUMATOID FACTOR (H): Primary | ICD-10-CM

## 2022-06-01 DIAGNOSIS — R76.8 CYCLIC CITRULLINATED PEPTIDE (CCP) ANTIBODY POSITIVE: ICD-10-CM

## 2022-06-01 PROCEDURE — 99214 OFFICE O/P EST MOD 30 MIN: CPT | Performed by: PHYSICIAN ASSISTANT

## 2022-06-01 NOTE — TELEPHONE ENCOUNTER
Pt will not consider sulfasalazine unless the statement of less likely means ZERO chance of TB reactivation.     Please clarify and any study results showing this.    Jaki STAPLETON   [facility] RN

## 2022-06-01 NOTE — TELEPHONE ENCOUNTER
Please call and let patient know that I spoke with Dr. Dawson.  He agrees that without the treatment of the latent TB, biologic medications are not recommended.  Given the latent TB diagnosis, sulfasalazine could be an option as the likelihood of reactivation of the TB is less likely with this medication.  We did also discussed the long-term use of chronic prednisone for the treatment of inflammatory arthritis.  Evidence does not support the use of chronic prednisone  for the treatment of inflammatory arthritis.  Certainly we can use it as needed for flares, however chronic use as the sole treatment for rheumatoid arthritis is not recommended given the risks associated with it.  If she would like to try the sulfasalazine we could start that medication as discussed at her office visit.  She will take 1 tablet daily for 1 week then increase to 1 tablet twice daily.  I would then want her to follow-up with me in approximately 1 to 2 months to review how she is tolerating it.    Misty Reeder PA-C on 6/1/2022 at 2:32 PM

## 2022-06-01 NOTE — PATIENT INSTRUCTIONS
After Visit Instructions:     Thank you for coming to [facility] for your care. It is my goal to partner with you to help you reach your optimal state of health.       Plan:     Schedule follow-up with Misty Reeder PA-C as needed.   Medication recommendations:   We may consider Sulfasalazine, I will discuss with a colleague and let you know the options.       Misty Reeder PA-C  [facility]  [facility]    Contact information: [facility]  Clinic Number:  [phone]  Please call or send a [employer] message with any questions about your care

## 2022-06-01 NOTE — PROGRESS NOTES
"Rheumatology Clinic Visit  [facility]  Misty Shieldserin, GURU     Leanne Zuñiga MRN# [id number]   YOB: 1950 Age: 71 year old   Date of Visit: 06/01/2022  Primary care provider: Cleo Henry          Assessment and Plan:     1.  Rheumatoid arthritis with positive rheumatoid factor  2.  CCP antibody positive  3.  On prednisone therapy  4.  TB lung, latent    Patient presents today to establish care for her seropositive rheumatoid arthritis.  She was diagnosed in 2014.  Since that time she has been on leflunomide, methotrexate, Humira and Enbrel.  She was also on IV Orencia.  Since being on the medications she reports that she has had hives, 2 types of cancer and TB.  She does not feel that Biologics work well for her given these other diagnoses.  She reports that she did see infectious disease in the past who recommended 6 to 9-month of antibiotic treatment for latent TB she reports that she is not going to do that and that it is \"not an option\".  She states that she is here at rheumatology to see if there are any new treatment recommendations for the RA.  She is currently on chronic prednisone.  She had a recent flare and was taking 50 mg of prednisone and has since tapered.  She plans to go back to 15 mg daily tomorrow.  She also is on Fosamax for osteopenia.  She also uses oxycodone as needed for pain.  Patient has also been having many issues with her low back including foot drop.  She has seen 2 spine surgeons who had very differing opinions.  1 recommended a fusion.  She has elected to not do the surgery and is going to try conservative measures including acupuncture.  In July 2021 her rheumatoid factor was positive at 332 and in 2014 her CCP antibody was positive at 117.  I also reviewed a CBC and CMP from July 2021.    Discussed with the patient that options are limited given the latent TB diagnosis without treatment.  Certainly would not recommend biologic therapy given that " "diagnosis.  Sulfasalazine may be something we could consider but I would like to discuss this with my collaborative physician prior to prescribing this.  Patient is also wondering if I would take over the prescriptions of prednisone and oxycodone.  Discussed with the patient that I do not prescribe opiates for the treatment of inflammatory arthritis.  Again would have to look in to prescribing long-term prednisone as this is also not a recommendation for inflammatory arthritis.  We briefly discussed that there are risk and benefits of every medication and long-term use of prednisone many times the risks are to outweigh the benefits.  Discussed that after I speak with my collaborative physician I will call the patient to discuss the options.  Current follow-up is as needed, this may change after fully reviewing options with my colleague.    Misty Reeder, [facility]  Rheumatology         History of Present Illness:   Leanne Zuñiga presents for evaluation of rheumatoid arthritis.      Was previously on Orencia for RA but stopped due to increased risk of malignancy. Hx of positive TB and dx with latent TB.     She has been on leflunomide, methotrexate, humira and enbrel. She states that all these did was give her \"hives, 2 types of cancer and TB\". She reports that she \"felt awful\" on the medications. She states that she had nausea and did not feel well.     Patient was previously evaluated by Dr. Coffey with [facility].  Last office visit note dated 4/10/2018.  Per that note patient was diagnosed with rheumatoid arthritis in 2014.  She had seen Dr. Raines in December 2017 and then transferred her care to Dr. Coffey.  She at that time was on IV Orencia with premedication due to developing hives.  The plan was to discontinue the Orencia secondary to the hives and diagnosis of melanoma.  The plan was for her to discuss IV rituximab with Dr. Singh.  Her QuantiFERON gold was positive therefore it was recommended that she get " "clearance from infectious disease prior to starting IV rituximab.  Patient had elected to take a break from Biologics and was not interested in seeing infectious disease. She states that she saw infectious disease. They had recommended that she take antibiotics for the TB but she states that she \"will never\" do this.     She states that her PCP has her on Prednisone and a few Oxycodone/month for flares. She states that her back is bothering her and she has foot drop. She has a difficult time  things. She states that she had a flare up of her RA and had to increase her Prednisone (to 50mg) recently due to stress. She had issues with her back and there was a discussion of a fusion. She states that she is not interested in a fusion. She is going to try another route. She is planning to go back down on her Prednisone to 15mg daily.     She has some morning stiffness, but the spinal issues is taking over the issues of everything else.          Review of Systems:     Constitutional: negative  Skin: negative  Eyes: negative  Ears/Nose/Throat: negative  Respiratory: No shortness of breath, dyspnea on exertion, cough, or hemoptysis  Cardiovascular: negative  Gastrointestinal: negative  Genitourinary: negative  Musculoskeletal: As above  Neurologic: negative  Psychiatric: negative  Hematologic/Lymphatic/Immunologic: negative  Endocrine: negative         Active Problem List:     Patient Active Problem List    Diagnosis Date Noted     Positive PPD 03/20/2015     Priority: Medium     High risk medications (not anticoagulants) long-term use 10/11/2014     Priority: Medium     Moderate major depression (H) 09/03/2014     Priority: Medium     Hyperlipidemia LDL goal <160 09/03/2014     Priority: Medium     Health care maintenance 08/28/2014     Priority: Medium     Refuses TDAP at this time  Refuses mammogram       Osteoarthritis 03/02/2014     Priority: Medium     RA (rheumatoid arthritis) (H) 01/27/2014     Priority: " Medium     Vitamin D deficiency 2013     Priority: Medium     Myalgia and myositis 2013     Priority: Medium            Past Medical History:     Past Medical History:   Diagnosis Date     Basal cell carcinoma      Depressive disorder, not elsewhere classified     stopped depression meds after 6 weeks of use     Malignant melanoma (H)      Pneumonia age 7     Past Surgical History:   Procedure Laterality Date     SURGICAL HISTORY OF -   age 7    T & A            Social History:     Social History     Socioeconomic History     Marital status: Single     Spouse name: Not on file     Number of children: Not on file     Years of education: Not on file     Highest education level: Not on file   Occupational History     Not on file   Tobacco Use     Smoking status: Former Smoker     Packs/day: 0.20     Years: 15.00     Pack years: 3.00     Types: Cigarettes     Quit date: 2011     Years since quittin.1     Smokeless tobacco: Never Used   Substance and Sexual Activity     Alcohol use: Yes     Comment: social wine weekly     Drug use: No     Sexual activity: Not Currently   Other Topics Concern     Parent/sibling w/ CABG, MI or angioplasty before 65F 55M? No   Social History Narrative     Not on file     Social Determinants of Health     Financial Resource Strain: Not on file   Food Insecurity: Not on file   Transportation Needs: Not on file   Physical Activity: Not on file   Stress: Not on file   Social Connections: Not on file   Intimate Partner Violence: Not on file   Housing Stability: Not on file          Family History:     Family History   Problem Relation Age of Onset     Heart Disease Father         Sudden death at age 68     Lipids Father      C.A.D. Father      Depression Mother         Suicidal     Cancer Mother         Occular Melanoma     Breast Cancer Mother      Anesthesia Reaction Mother      Gastrointestinal Disease Mother         Ulcer     Respiratory Mother         Emphysema      Melanoma Mother      Depression Sister      Arthritis Sister         oseto artthritis     Heart Disease Paternal Grandmother      Alcohol/Drug Paternal Grandfather      Allergies Brother      Gastrointestinal Disease Sister         Ulcer            Allergies:     Allergies   Allergen Reactions     Effexor [Venlafaxine Hydrochloride]      Insomnia at high doses.     Orencia [Abatacept] Hives     Hives from IV form (Has done fine in past with Injectable)     Paxil [Paroxetine]      Sedation, decreased libido.            Medications:     Current Outpatient Medications   Medication Sig Dispense Refill     acetaminophen (TYLENOL) 325 MG tablet Take 2 tablets (650 mg) by mouth every 4 hours as needed for pain 100 tablet 0     albuterol (PROAIR HFA/PROVENTIL HFA/VENTOLIN HFA) 108 (90 Base) MCG/ACT inhaler Inhale 2 puffs into the lungs       Alpha-Lipoic Acid 50 MG CAPS 100-200 mg daily       ascorbic acid (VITAMIN C) 500 MG tablet Take 3 tablets (1,500 mg) by mouth daily 30 tablet      calcium-magnesium (CALMAG) 500-250 MG TABS Take 1 tablet by mouth daily       Cholecalciferol (VITAMIN D) 2000 UNITS tablet Take 2,000 Units by mouth Twice weekly 100 tablet 3     diazepam (VALIUM) 5 MG tablet Take 1 tablet (5 mg) by mouth every 12 hours as needed for anxiety or sleep 60 tablet 0     ibuprofen (ADVIL,MOTRIN) 600 MG tablet Take 1 tablet (600 mg) by mouth every 6 hours as needed for pain 90 tablet 1     ketoconazole (NIZORAL) 2 % external cream Apply to foot BID x 14 days 30 g 3     Krill Oil 1000 MG CAPS Taking 2 daily.       L-Tryptophan 500 MG CAPS Take 2 capsules by mouth       MISC NATURAL PRODUCTS PO Curamin       niacin 500 MG tablet Take 1 tablet (500 mg) by mouth daily (with breakfast) Takes 2000 mg daily       OVER-THE-COUNTER Bio-Flex.       OVER-THE-COUNTER Ashwagandha 400 mg daily       OVER-THE-COUNTER Tumeric       OVER-THE-COUNTER Megastress B with vitamin C 2 tablets daily       OVER-THE-COUNTER Powdered  barely grass 1 tablespoon daily       oxyCODONE (ROXICODONE) 5 MG tablet Take 5 mg by mouth       predniSONE (DELTASONE) 1 MG tablet Take 4 tablets (4 mg) by mouth daily 120 tablet 5            Physical Exam:   There were no vitals taken for this visit.  Wt Readings from Last 6 Encounters:   12/14/17 66.2 kg (146 lb)   10/11/17 67.6 kg (149 lb)   08/09/17 69.1 kg (152 lb 6.4 oz)   06/29/17 68 kg (150 lb)   06/07/17 70.3 kg (155 lb)   04/12/17 73.4 kg (161 lb 12.8 oz)     Constitutional: well-developed, appearing stated age; cooperative  Eyes: nl  conjunctiva, sclera  ENT: nl external ears,  hearing,  Neck: no mass or thyroid enlargement  Resp: lungs clear to auscultation  CV: RRR, no murmurs, rubs or gallops, no edema  Lymph: no cervical, supraclavicular or epitrochlear nodes  MS: The TMJ, shoulder, elbow, wrist, MCP/PIP/DIP,  knee,  joints were examined.  There is active synovitis in the left second and third MCP as well as the right second MCP, no tenderness. Full joint ROM. Normal  strength. No dactylitis,  tenosynovitis, enthesopathy.   Skin: no nail pitting, alopecia, rash, nodules or lesions.   Neuro: nl cranial nerves.   Psych: nl judgement, orientation, memory, affect.           Data:   Imaging:  None      Laboratory:  7/13/2021  Rheumatoid Factor 332.15    1/27/2014   CCP antibody 117

## 2022-06-02 NOTE — TELEPHONE ENCOUNTER
I cannot state that there is a zero chance. There is an increased risk of infection with Sulfasalazine, although it does not specify TB reactivation, I cannot say there is zero risk.     Misty Reeder PA-C on 6/2/2022 at 8:01 AM

## 2023-02-08 ENCOUNTER — HOSPITAL ENCOUNTER (OUTPATIENT)
Dept: MRI IMAGING | Facility: CLINIC | Age: 73
Discharge: HOME | End: 2023-02-08
Attending: STUDENT IN AN ORGANIZED HEALTH CARE EDUCATION/TRAINING PROGRAM | Admitting: STUDENT IN AN ORGANIZED HEALTH CARE EDUCATION/TRAINING PROGRAM
Payer: MEDICARE

## 2023-02-08 DIAGNOSIS — M54.50 LOW BACK PAIN: ICD-10-CM

## 2023-02-08 PROCEDURE — 72148 MRI LUMBAR SPINE W/O DYE: CPT

## 2023-04-25 ENCOUNTER — ANESTHESIA EVENT (OUTPATIENT)
Dept: SURGERY | Facility: CLINIC | Age: 73
DRG: 519 | End: 2023-04-25
Payer: MEDICARE

## 2023-04-25 ASSESSMENT — LIFESTYLE VARIABLES: TOBACCO_USE: 1

## 2023-04-25 NOTE — ANESTHESIA PREPROCEDURE EVALUATION
Anesthesia Pre-Procedure Evaluation    Patient: Leanne Zuñiga   MRN: [id number] : 1950        Procedure : Procedure(s):  Lumbar 4-5 Revision Discectomy          Past Medical History:   Diagnosis Date     Basal cell carcinoma      Depressive disorder, not elsewhere classified     stopped depression meds after 6 weeks of use     Malignant melanoma (H)      Pneumonia age 7      Past Surgical History:   Procedure Laterality Date     SURGICAL HISTORY OF -   age 7    T & A      Allergies   Allergen Reactions     Effexor [Venlafaxine Hydrochloride]      Insomnia at high doses.     Orencia [Abatacept] Hives     Hives from IV form (Has done fine in past with Injectable)     Paxil [Paroxetine]      Sedation, decreased libido.      Social History     Tobacco Use     Smoking status: Former     Packs/day: 0.20     Years: 15.00     Pack years: 3.00     Types: Cigarettes     Quit date: 2011     Years since quittin.0     Smokeless tobacco: Never   Vaping Use     Vaping status: Not on file   Substance Use Topics     Alcohol use: Yes     Comment: social wine weekly      Wt Readings from Last 1 Encounters:   22 67.6 kg (149 lb)        Anesthesia Evaluation   Pt has had prior anesthetic. Type: MAC and General.        ROS/MED HX  ENT/Pulmonary:     (+) tobacco use, Past use,     Neurologic:       Cardiovascular:     (+) Dyslipidemia -----    METS/Exercise Tolerance:     Hematologic:       Musculoskeletal:   (+) arthritis,     GI/Hepatic:  - neg GI/hepatic ROS     Renal/Genitourinary:  - neg Renal ROS     Endo:     (+) thyroid problem, hypothyroidism,     Psychiatric/Substance Use:     (+) psychiatric history depression     Infectious Disease:       Malignancy:   (+) Malignancy, History of Skin.    Other:            Physical Exam    Airway        Mallampati: II   TM distance: > 3 FB   Neck ROM: full   Mouth opening: > 3 cm    Respiratory Devices and Support         Dental    unable to assess         Cardiovascular   cardiovascular exam normal          Pulmonary   pulmonary exam normal                OUTSIDE LABS:  CBC:   Lab Results   Component Value Date    WBC 10.3 02/08/2017    WBC 15.4 (H) 11/14/2016    HGB 15.8 (H) 02/08/2017    HGB 15.4 11/14/2016    HCT 47.0 02/08/2017    HCT 46.0 11/14/2016     02/08/2017     11/14/2016     BMP:   Lab Results   Component Value Date     02/08/2017     11/14/2016    POTASSIUM 3.9 02/08/2017    POTASSIUM 4.3 11/14/2016    CHLORIDE 108 02/08/2017    CHLORIDE 106 11/14/2016    CO2 28 02/08/2017    CO2 27 11/14/2016    BUN 8 02/08/2017    BUN 8 11/14/2016    CR 0.86 02/08/2017    CR 0.82 11/14/2016    GLC 84 02/08/2017    GLC 88 11/14/2016     COAGS: No results found for: PTT, INR, FIBR  POC: No results found for: BGM, HCG, HCGS  HEPATIC:   Lab Results   Component Value Date    ALBUMIN 3.4 02/08/2017    PROTTOTAL 6.7 (L) 02/08/2017    ALT 27 02/08/2017    AST 13 02/08/2017    ALKPHOS 76 02/08/2017    BILITOTAL 0.5 02/08/2017     OTHER:   Lab Results   Component Value Date    A1C 5.6 08/19/2015    JOVANNI 8.8 02/08/2017    TSH 0.53 04/12/2017    T4 1.18 08/25/2014    CRP 10.5 (H) 04/12/2017    SED 7 04/12/2017       Anesthesia Plan    ASA Status:  2      Anesthesia Type: General.     - Airway: ETT   Induction: Propofol.   Maintenance: TIVA.        Consents    Anesthesia Plan(s) and associated risks, benefits, and realistic alternatives discussed. Questions answered and patient/representative(s) expressed understanding.     - Discussed: Risks, Benefits and Alternatives for the PROCEDURE were discussed     - Discussed with:  Patient         Postoperative Care    Pain management: IV analgesics, Oral pain medications.   PONV prophylaxis: Ondansetron (or other 5HT-3), Dexamethasone or Solumedrol, Background Propofol Infusion     Comments:                AMBER Newton CRNA

## 2023-04-26 ENCOUNTER — APPOINTMENT (OUTPATIENT)
Dept: GENERAL RADIOLOGY | Facility: CLINIC | Age: 73
DRG: 519 | End: 2023-04-26
Attending: ORTHOPAEDIC SURGERY
Payer: MEDICARE

## 2023-04-26 ENCOUNTER — HOSPITAL ENCOUNTER (INPATIENT)
Facility: CLINIC | Age: 73
LOS: 1 days | Discharge: HOME HEALTH | DRG: 519 | End: 2023-04-29
Attending: ORTHOPAEDIC SURGERY | Admitting: ORTHOPAEDIC SURGERY
Payer: MEDICARE

## 2023-04-26 ENCOUNTER — ANESTHESIA (OUTPATIENT)
Dept: SURGERY | Facility: CLINIC | Age: 73
DRG: 519 | End: 2023-04-26
Payer: MEDICARE

## 2023-04-26 DIAGNOSIS — Z98.890 S/P LUMBAR DISCECTOMY: Primary | ICD-10-CM

## 2023-04-26 PROCEDURE — 999N000141 HC STATISTIC PRE-PROCEDURE NURSING ASSESSMENT: Performed by: ORTHOPAEDIC SURGERY

## 2023-04-26 PROCEDURE — 0SB20ZZ EXCISION OF LUMBAR VERTEBRAL DISC, OPEN APPROACH: ICD-10-PCS | Performed by: ORTHOPAEDIC SURGERY

## 2023-04-26 PROCEDURE — 250N000011 HC RX IP 250 OP 636: Performed by: NURSE ANESTHETIST, CERTIFIED REGISTERED

## 2023-04-26 PROCEDURE — 250N000012 HC RX MED GY IP 250 OP 636 PS 637: Performed by: ORTHOPAEDIC SURGERY

## 2023-04-26 PROCEDURE — 250N000011 HC RX IP 250 OP 636: Performed by: ORTHOPAEDIC SURGERY

## 2023-04-26 PROCEDURE — 250N000013 HC RX MED GY IP 250 OP 250 PS 637: Performed by: ORTHOPAEDIC SURGERY

## 2023-04-26 PROCEDURE — 258N000003 HC RX IP 258 OP 636: Performed by: ORTHOPAEDIC SURGERY

## 2023-04-26 PROCEDURE — 999N000179 XR SURGERY CARM FLUORO LESS THAN 5 MIN W STILLS: Mod: TC

## 2023-04-26 PROCEDURE — 370N000017 HC ANESTHESIA TECHNICAL FEE, PER MIN: Performed by: ORTHOPAEDIC SURGERY

## 2023-04-26 PROCEDURE — 258N000003 HC RX IP 258 OP 636: Performed by: NURSE ANESTHETIST, CERTIFIED REGISTERED

## 2023-04-26 PROCEDURE — 272N000001 HC OR GENERAL SUPPLY STERILE: Performed by: ORTHOPAEDIC SURGERY

## 2023-04-26 PROCEDURE — 710N000009 HC RECOVERY PHASE 1, LEVEL 1, PER MIN: Performed by: ORTHOPAEDIC SURGERY

## 2023-04-26 PROCEDURE — 250N000009 HC RX 250: Performed by: NURSE ANESTHETIST, CERTIFIED REGISTERED

## 2023-04-26 PROCEDURE — 250N000013 HC RX MED GY IP 250 OP 250 PS 637: Performed by: INTERNAL MEDICINE

## 2023-04-26 PROCEDURE — 271N000001 HC OR GENERAL SUPPLY NON-STERILE: Performed by: ORTHOPAEDIC SURGERY

## 2023-04-26 PROCEDURE — 360N000084 HC SURGERY LEVEL 4 W/ FLUORO, PER MIN: Performed by: ORTHOPAEDIC SURGERY

## 2023-04-26 RX ORDER — TIZANIDINE 2 MG/1
TABLET ORAL
COMMUNITY
Start: 2023-04-11

## 2023-04-26 RX ORDER — AMOXICILLIN 250 MG
1 CAPSULE ORAL 2 TIMES DAILY
Status: DISCONTINUED | OUTPATIENT
Start: 2023-04-26 | End: 2023-04-29 | Stop reason: HOSPADM

## 2023-04-26 RX ORDER — PROPOFOL 10 MG/ML
INJECTION, EMULSION INTRAVENOUS CONTINUOUS PRN
Status: DISCONTINUED | OUTPATIENT
Start: 2023-04-26 | End: 2023-04-26

## 2023-04-26 RX ORDER — SODIUM CHLORIDE, SODIUM LACTATE, POTASSIUM CHLORIDE, CALCIUM CHLORIDE 600; 310; 30; 20 MG/100ML; MG/100ML; MG/100ML; MG/100ML
INJECTION, SOLUTION INTRAVENOUS CONTINUOUS
Status: DISCONTINUED | OUTPATIENT
Start: 2023-04-26 | End: 2023-04-26 | Stop reason: HOSPADM

## 2023-04-26 RX ORDER — HYDROCODONE BITARTRATE AND ACETAMINOPHEN 5; 325 MG/1; MG/1
1-2 TABLET ORAL EVERY 4 HOURS PRN
Status: DISCONTINUED | OUTPATIENT
Start: 2023-04-26 | End: 2023-04-27

## 2023-04-26 RX ORDER — KETAMINE HYDROCHLORIDE 10 MG/ML
INJECTION INTRAMUSCULAR; INTRAVENOUS PRN
Status: DISCONTINUED | OUTPATIENT
Start: 2023-04-26 | End: 2023-04-26

## 2023-04-26 RX ORDER — SODIUM CHLORIDE 9 MG/ML
INJECTION, SOLUTION INTRAVENOUS CONTINUOUS
Status: DISCONTINUED | OUTPATIENT
Start: 2023-04-26 | End: 2023-04-29 | Stop reason: HOSPADM

## 2023-04-26 RX ORDER — ALBUTEROL SULFATE 90 UG/1
2 AEROSOL, METERED RESPIRATORY (INHALATION) EVERY 6 HOURS PRN
Status: CANCELLED | OUTPATIENT
Start: 2023-04-26

## 2023-04-26 RX ORDER — GABAPENTIN 100 MG/1
300 CAPSULE ORAL 3 TIMES DAILY
COMMUNITY
Start: 2023-02-27

## 2023-04-26 RX ORDER — CEFAZOLIN SODIUM/WATER 2 G/20 ML
2 SYRINGE (ML) INTRAVENOUS
Status: COMPLETED | OUTPATIENT
Start: 2023-04-26 | End: 2023-04-26

## 2023-04-26 RX ORDER — ONDANSETRON 2 MG/ML
INJECTION INTRAMUSCULAR; INTRAVENOUS PRN
Status: DISCONTINUED | OUTPATIENT
Start: 2023-04-26 | End: 2023-04-26

## 2023-04-26 RX ORDER — HYDROXYZINE HYDROCHLORIDE 10 MG/1
10 TABLET, FILM COATED ORAL EVERY 6 HOURS PRN
Status: DISCONTINUED | OUTPATIENT
Start: 2023-04-26 | End: 2023-04-28

## 2023-04-26 RX ORDER — SODIUM CHLORIDE, SODIUM LACTATE, POTASSIUM CHLORIDE, CALCIUM CHLORIDE 600; 310; 30; 20 MG/100ML; MG/100ML; MG/100ML; MG/100ML
INJECTION, SOLUTION INTRAVENOUS CONTINUOUS PRN
Status: DISCONTINUED | OUTPATIENT
Start: 2023-04-26 | End: 2023-04-26

## 2023-04-26 RX ORDER — DEXAMETHASONE SODIUM PHOSPHATE 4 MG/ML
INJECTION, SOLUTION INTRA-ARTICULAR; INTRALESIONAL; INTRAMUSCULAR; INTRAVENOUS; SOFT TISSUE PRN
Status: DISCONTINUED | OUTPATIENT
Start: 2023-04-26 | End: 2023-04-26

## 2023-04-26 RX ORDER — GABAPENTIN 300 MG/1
300 CAPSULE ORAL 3 TIMES DAILY
Status: DISCONTINUED | OUTPATIENT
Start: 2023-04-26 | End: 2023-04-29 | Stop reason: HOSPADM

## 2023-04-26 RX ORDER — KETOROLAC TROMETHAMINE 30 MG/ML
INJECTION, SOLUTION INTRAMUSCULAR; INTRAVENOUS PRN
Status: DISCONTINUED | OUTPATIENT
Start: 2023-04-26 | End: 2023-04-26

## 2023-04-26 RX ORDER — FENTANYL CITRATE 50 UG/ML
INJECTION, SOLUTION INTRAMUSCULAR; INTRAVENOUS PRN
Status: DISCONTINUED | OUTPATIENT
Start: 2023-04-26 | End: 2023-04-26

## 2023-04-26 RX ORDER — CEFAZOLIN SODIUM 1 G/3ML
1 INJECTION, POWDER, FOR SOLUTION INTRAMUSCULAR; INTRAVENOUS EVERY 8 HOURS
Status: COMPLETED | OUTPATIENT
Start: 2023-04-26 | End: 2023-04-27

## 2023-04-26 RX ORDER — BUPIVACAINE HYDROCHLORIDE 2.5 MG/ML
INJECTION, SOLUTION INFILTRATION; PERINEURAL PRN
Status: DISCONTINUED | OUTPATIENT
Start: 2023-04-26 | End: 2023-04-26 | Stop reason: HOSPADM

## 2023-04-26 RX ORDER — ONDANSETRON 2 MG/ML
4 INJECTION INTRAMUSCULAR; INTRAVENOUS EVERY 6 HOURS PRN
Status: DISCONTINUED | OUTPATIENT
Start: 2023-04-26 | End: 2023-04-29 | Stop reason: HOSPADM

## 2023-04-26 RX ORDER — BISACODYL 10 MG
10 SUPPOSITORY, RECTAL RECTAL DAILY PRN
Status: DISCONTINUED | OUTPATIENT
Start: 2023-04-26 | End: 2023-04-29 | Stop reason: HOSPADM

## 2023-04-26 RX ORDER — DIAZEPAM 5 MG
5 TABLET ORAL EVERY 12 HOURS PRN
Status: DISCONTINUED | OUTPATIENT
Start: 2023-04-26 | End: 2023-04-29 | Stop reason: HOSPADM

## 2023-04-26 RX ORDER — ACETAMINOPHEN 325 MG/1
975 TABLET ORAL ONCE
Status: DISCONTINUED | OUTPATIENT
Start: 2023-04-26 | End: 2023-04-26 | Stop reason: HOSPADM

## 2023-04-26 RX ORDER — NALOXONE HYDROCHLORIDE 0.4 MG/ML
0.4 INJECTION, SOLUTION INTRAMUSCULAR; INTRAVENOUS; SUBCUTANEOUS
Status: DISCONTINUED | OUTPATIENT
Start: 2023-04-26 | End: 2023-04-29 | Stop reason: HOSPADM

## 2023-04-26 RX ORDER — HYDROXYZINE HYDROCHLORIDE 10 MG/1
10 TABLET, FILM COATED ORAL EVERY 6 HOURS PRN
Qty: 30 TABLET | Refills: 0 | Status: SHIPPED | OUTPATIENT
Start: 2023-04-26 | End: 2023-04-28

## 2023-04-26 RX ORDER — HYDROMORPHONE HCL IN WATER/PF 6 MG/30 ML
0.4 PATIENT CONTROLLED ANALGESIA SYRINGE INTRAVENOUS EVERY 5 MIN PRN
Status: DISCONTINUED | OUTPATIENT
Start: 2023-04-26 | End: 2023-04-26 | Stop reason: HOSPADM

## 2023-04-26 RX ORDER — DIAZEPAM 2 MG
2 TABLET ORAL
Status: ON HOLD | COMMUNITY
End: 2023-04-28

## 2023-04-26 RX ORDER — HYDROMORPHONE HCL IN WATER/PF 6 MG/30 ML
0.2 PATIENT CONTROLLED ANALGESIA SYRINGE INTRAVENOUS EVERY 5 MIN PRN
Status: DISCONTINUED | OUTPATIENT
Start: 2023-04-26 | End: 2023-04-26 | Stop reason: HOSPADM

## 2023-04-26 RX ORDER — PROPOFOL 10 MG/ML
INJECTION, EMULSION INTRAVENOUS PRN
Status: DISCONTINUED | OUTPATIENT
Start: 2023-04-26 | End: 2023-04-26

## 2023-04-26 RX ORDER — HYDROMORPHONE HCL IN WATER/PF 6 MG/30 ML
0.2 PATIENT CONTROLLED ANALGESIA SYRINGE INTRAVENOUS
Status: DISCONTINUED | OUTPATIENT
Start: 2023-04-26 | End: 2023-04-29 | Stop reason: HOSPADM

## 2023-04-26 RX ORDER — CEFAZOLIN SODIUM/WATER 2 G/20 ML
2 SYRINGE (ML) INTRAVENOUS SEE ADMIN INSTRUCTIONS
Status: DISCONTINUED | OUTPATIENT
Start: 2023-04-26 | End: 2023-04-26 | Stop reason: HOSPADM

## 2023-04-26 RX ORDER — ONDANSETRON 2 MG/ML
4 INJECTION INTRAMUSCULAR; INTRAVENOUS EVERY 30 MIN PRN
Status: DISCONTINUED | OUTPATIENT
Start: 2023-04-26 | End: 2023-04-26 | Stop reason: HOSPADM

## 2023-04-26 RX ORDER — PREDNISONE 5 MG/1
15 TABLET ORAL DAILY
COMMUNITY
Start: 2023-04-24

## 2023-04-26 RX ORDER — FENTANYL CITRATE 50 UG/ML
50 INJECTION, SOLUTION INTRAMUSCULAR; INTRAVENOUS EVERY 5 MIN PRN
Status: DISCONTINUED | OUTPATIENT
Start: 2023-04-26 | End: 2023-04-26 | Stop reason: HOSPADM

## 2023-04-26 RX ORDER — ONDANSETRON 4 MG/1
4 TABLET, ORALLY DISINTEGRATING ORAL EVERY 6 HOURS PRN
Status: DISCONTINUED | OUTPATIENT
Start: 2023-04-26 | End: 2023-04-29 | Stop reason: HOSPADM

## 2023-04-26 RX ORDER — LIDOCAINE 40 MG/G
CREAM TOPICAL
Status: DISCONTINUED | OUTPATIENT
Start: 2023-04-26 | End: 2023-04-26 | Stop reason: HOSPADM

## 2023-04-26 RX ORDER — LIDOCAINE 40 MG/G
CREAM TOPICAL
Status: DISCONTINUED | OUTPATIENT
Start: 2023-04-26 | End: 2023-04-29 | Stop reason: HOSPADM

## 2023-04-26 RX ORDER — NALOXONE HYDROCHLORIDE 0.4 MG/ML
0.2 INJECTION, SOLUTION INTRAMUSCULAR; INTRAVENOUS; SUBCUTANEOUS
Status: DISCONTINUED | OUTPATIENT
Start: 2023-04-26 | End: 2023-04-29 | Stop reason: HOSPADM

## 2023-04-26 RX ORDER — POLYETHYLENE GLYCOL 3350 17 G/17G
17 POWDER, FOR SOLUTION ORAL DAILY
Status: DISCONTINUED | OUTPATIENT
Start: 2023-04-27 | End: 2023-04-29 | Stop reason: HOSPADM

## 2023-04-26 RX ORDER — HYDROCODONE BITARTRATE AND ACETAMINOPHEN 5; 325 MG/1; MG/1
1-2 TABLET ORAL EVERY 4 HOURS PRN
Qty: 25 TABLET | Refills: 0 | Status: SHIPPED | OUTPATIENT
Start: 2023-04-26 | End: 2023-04-28

## 2023-04-26 RX ORDER — ONDANSETRON 4 MG/1
4 TABLET, ORALLY DISINTEGRATING ORAL EVERY 30 MIN PRN
Status: DISCONTINUED | OUTPATIENT
Start: 2023-04-26 | End: 2023-04-26 | Stop reason: HOSPADM

## 2023-04-26 RX ORDER — ACETAMINOPHEN 325 MG/1
975 TABLET ORAL ONCE
Status: DISCONTINUED | OUTPATIENT
Start: 2023-04-26 | End: 2023-04-26

## 2023-04-26 RX ORDER — CALCIUM CARBONATE 500 MG/1
500 TABLET, CHEWABLE ORAL 2 TIMES DAILY PRN
Status: DISCONTINUED | OUTPATIENT
Start: 2023-04-26 | End: 2023-04-29 | Stop reason: HOSPADM

## 2023-04-26 RX ORDER — FENTANYL CITRATE 50 UG/ML
25 INJECTION, SOLUTION INTRAMUSCULAR; INTRAVENOUS EVERY 5 MIN PRN
Status: DISCONTINUED | OUTPATIENT
Start: 2023-04-26 | End: 2023-04-26 | Stop reason: HOSPADM

## 2023-04-26 RX ORDER — GLYCOPYRROLATE 0.2 MG/ML
INJECTION, SOLUTION INTRAMUSCULAR; INTRAVENOUS PRN
Status: DISCONTINUED | OUTPATIENT
Start: 2023-04-26 | End: 2023-04-26

## 2023-04-26 RX ORDER — HYDROMORPHONE HCL IN WATER/PF 6 MG/30 ML
0.4 PATIENT CONTROLLED ANALGESIA SYRINGE INTRAVENOUS
Status: DISCONTINUED | OUTPATIENT
Start: 2023-04-26 | End: 2023-04-29 | Stop reason: HOSPADM

## 2023-04-26 RX ADMIN — ROCURONIUM BROMIDE 30 MG: 50 INJECTION, SOLUTION INTRAVENOUS at 09:55

## 2023-04-26 RX ADMIN — FENTANYL CITRATE 100 MCG: 50 INJECTION, SOLUTION INTRAMUSCULAR; INTRAVENOUS at 09:55

## 2023-04-26 RX ADMIN — FENTANYL CITRATE 50 MCG: 50 INJECTION, SOLUTION INTRAMUSCULAR; INTRAVENOUS at 12:09

## 2023-04-26 RX ADMIN — SENNOSIDES AND DOCUSATE SODIUM 1 TABLET: 8.6; 5 TABLET ORAL at 20:51

## 2023-04-26 RX ADMIN — HYDROMORPHONE HYDROCHLORIDE 0.4 MG: 0.2 INJECTION, SOLUTION INTRAMUSCULAR; INTRAVENOUS; SUBCUTANEOUS at 12:27

## 2023-04-26 RX ADMIN — HYDROXYZINE HYDROCHLORIDE 10 MG: 10 TABLET ORAL at 18:31

## 2023-04-26 RX ADMIN — HYDROMORPHONE HYDROCHLORIDE 0.2 MG: 0.2 INJECTION, SOLUTION INTRAMUSCULAR; INTRAVENOUS; SUBCUTANEOUS at 13:02

## 2023-04-26 RX ADMIN — KETOROLAC TROMETHAMINE 15 MG: 30 INJECTION, SOLUTION INTRAMUSCULAR at 11:34

## 2023-04-26 RX ADMIN — CALCIUM CARBONATE (ANTACID) CHEW TAB 500 MG 500 MG: 500 CHEW TAB at 20:59

## 2023-04-26 RX ADMIN — MIDAZOLAM 1 MG: 1 INJECTION INTRAMUSCULAR; INTRAVENOUS at 10:03

## 2023-04-26 RX ADMIN — DEXAMETHASONE SODIUM PHOSPHATE 8 MG: 4 INJECTION, SOLUTION INTRA-ARTICULAR; INTRALESIONAL; INTRAMUSCULAR; INTRAVENOUS; SOFT TISSUE at 10:02

## 2023-04-26 RX ADMIN — Medication 2 G: at 09:47

## 2023-04-26 RX ADMIN — HYDROMORPHONE HYDROCHLORIDE 0.2 MG: 0.2 INJECTION, SOLUTION INTRAMUSCULAR; INTRAVENOUS; SUBCUTANEOUS at 12:40

## 2023-04-26 RX ADMIN — SODIUM CHLORIDE, POTASSIUM CHLORIDE, SODIUM LACTATE AND CALCIUM CHLORIDE: 600; 310; 30; 20 INJECTION, SOLUTION INTRAVENOUS at 11:44

## 2023-04-26 RX ADMIN — CEFAZOLIN 1 G: 1 INJECTION, POWDER, FOR SOLUTION INTRAMUSCULAR; INTRAVENOUS at 18:31

## 2023-04-26 RX ADMIN — MIDAZOLAM 1 MG: 1 INJECTION INTRAMUSCULAR; INTRAVENOUS at 09:55

## 2023-04-26 RX ADMIN — HYDROMORPHONE HYDROCHLORIDE 0.2 MG: 0.2 INJECTION, SOLUTION INTRAMUSCULAR; INTRAVENOUS; SUBCUTANEOUS at 21:46

## 2023-04-26 RX ADMIN — PREDNISONE 15 MG: 10 TABLET ORAL at 16:37

## 2023-04-26 RX ADMIN — SODIUM CHLORIDE: 9 INJECTION, SOLUTION INTRAVENOUS at 14:48

## 2023-04-26 RX ADMIN — PROPOFOL 150 MG: 10 INJECTION, EMULSION INTRAVENOUS at 09:54

## 2023-04-26 RX ADMIN — FENTANYL CITRATE 50 MCG: 50 INJECTION, SOLUTION INTRAMUSCULAR; INTRAVENOUS at 12:01

## 2023-04-26 RX ADMIN — PROPOFOL 200 MCG/KG/MIN: 10 INJECTION, EMULSION INTRAVENOUS at 09:54

## 2023-04-26 RX ADMIN — ONDANSETRON 4 MG: 2 INJECTION INTRAMUSCULAR; INTRAVENOUS at 10:02

## 2023-04-26 RX ADMIN — KETAMINE HYDROCHLORIDE 30 MG: 10 INJECTION, SOLUTION INTRAMUSCULAR; INTRAVENOUS at 09:54

## 2023-04-26 RX ADMIN — SUGAMMADEX 200 MG: 100 INJECTION, SOLUTION INTRAVENOUS at 11:41

## 2023-04-26 RX ADMIN — GABAPENTIN 300 MG: 300 CAPSULE ORAL at 20:51

## 2023-04-26 RX ADMIN — HYDROMORPHONE HYDROCHLORIDE 0.2 MG: 0.2 INJECTION, SOLUTION INTRAMUSCULAR; INTRAVENOUS; SUBCUTANEOUS at 12:55

## 2023-04-26 RX ADMIN — HYDROCODONE BITARTRATE AND ACETAMINOPHEN 1 TABLET: 5; 325 TABLET ORAL at 18:31

## 2023-04-26 RX ADMIN — GLYCOPYRROLATE 0.2 MG: 0.2 INJECTION, SOLUTION INTRAMUSCULAR; INTRAVENOUS at 10:02

## 2023-04-26 ASSESSMENT — ACTIVITIES OF DAILY LIVING (ADL)
ADLS_ACUITY_SCORE: 24
ADLS_ACUITY_SCORE: 22
ADLS_ACUITY_SCORE: 24
ADLS_ACUITY_SCORE: 35
ADLS_ACUITY_SCORE: 22

## 2023-04-26 NOTE — PROGRESS NOTES
[facility] ADMISSION NOTE    Patient admitted to room 2304 at approximately 1500 via cart from surgery. Patient was accompanied by transport tech.     Verbal SBAR report received from CLEMENTE Heart prior to patient arrival.     Patient trasferred to bed via slip sheet Patient alert and oriented X 3. Pain is controlled with current medication listed in MAR  . Admission vital signs: Blood pressure 139/70, pulse 80, temperature 97.3  F (36.3  C), temperature source Oral, resp. rate 16, SpO2 96 %. Patient was oriented to plan of care, call light, bed controls, tv, telephone, bathroom and visiting hours.     Risk Assessment    The following safety risks were identified during admission: fall. Yellow risk band applied: YES.     Skin Initial Assessment    This writer admitted this patient and completed a full skin assessment and Jimbo score in the Adult PCS flowsheet. Appropriate interventions initiated as needed.     Secondary skin check completed by CLEMENTE Graham    Jimbo Risk Assessment  Sensory Perception: 4-->no impairment  Moisture: 4-->rarely moist  Activity: 3-->walks occasionally  Mobility: 3-->slightly limited  Nutrition: 3-->adequate  Friction and Shear: 3-->no apparent problem  Jimbo Score: 20  Mattress: Standard gel/foam mattress (IsoFlex, Atmos Air, etc.)  Bed Frame: Standard width and length    Education    Patient has a [place] to Observation order: Yes  Observation education completed and documented: Yes      Linda Coker RN

## 2023-04-26 NOTE — DISCHARGE INSTRUCTIONS
Lumbar Spine Surgery Discharge Instructions    Your surgery was: Revision left L4-5 discectomy    Your surgeon is: Jp Calixto MD    Restrictions after lumbar surgery:  - You should not do any excessive bending or twisting of your back beyond that needed to get in and out of a bed/chair, a car, or other similar daily activities.    - No lifting greater than 10 lbs (approximately what 1 gallon of milk weighs) until you are instructed that it is okay at your clinic follow-up visit.    - You should not drive a car or operate heavy machinery if you are taking prescribed narcotic pain medication    - If you take blood thinner medications such as Aspirin, Plavix, Coumadin/Warfarin, Lovenox, Xarelto, or other similar medications please discuss with your surgeon when these can be restarted.    - Wound Care Instructions: Under your operative site dressing there are steri strips (small band-aids that go over the incision site). You can remove your dressing three days after surgery prior to your first time showering. Leave steri strips on until they fall off on their own or until you return to clinic for your post-operative appointment. It is okay to shower and get your wound wet, just do not let the water spray directly on your incision. Do not soak in a bathtub or swimming pool until you are told it is okay to do so when you return to clinic. The sutures are all buried under the skin and will dissolve on their own with time. If there is any drainage from the incision then you should place a new dry gauze dressing over it after a shower. If the incision and steri strips are dry then you can leave it without a dressing.    - Walking is your main physical therapy for now - we generally will not order PT unless it is determined at a later date in clinic that this is necessary. You should generally try to do at least short walks several times daily.    - If you are prescribed narcotic pain medications (Oxycodone, Norco,  Percocet, Tylenol #3, Dilaudid, etc) then you should try to wean off of them as tolerated. These are AS NEEDED medications, so if you are not having significant pain you should try to take fewer pills at a time or spread out the doses out over a longer period of time than is written on the prescription. As an example if you are prescribed 1-2 pills of pain medication every 4 hours AS NEEDED for pain, then you should begin taking only 1 pill every 4 hours as your pain tolerates. Then when 1 pill is giving good pain relief you should try to spread the doses out longer than 4 hours apart as you can tolerate it.    - You should avoid taking any more pain medications than is written on the prescription. In the event that you run out of the pills prior to when you should based on the written instructions, it is likely that your insurance provider will deny paying for a refill early.    - If your pain pill contains Tylenol (i.e. Percocet, Norco, Tylenol #3) and you are also taking additional Tylenol/acetaminophen as a pain medication, ensure that you are not taking too much tylenol. Prescription narcotic medications that contain Tylenol usually have 325mg of Tylenol per pill and over-the-counter medications have variable dose of Tylenol. You should not exceed 4,000mg of Tylenol in a 24-hour period.    Call the office if you have any questions/concerns or are experiencing the following:  - increasing drainage from the incision  - foul-smelling or malodorous drainage from the incision  - increasing pain not controlled by your prescribed medications  - inability to urinate  - new onset of weakness, numbness or severe pain in the extremities  - bowel/bladder incontinence  - Fever greater than 101.5 degrees  - Nausea/vomitting causing inability to eat food or medications    During normal business hours 7:30AM to 5:00PM on Monday-Friday you can reach my clinical assistant at [phone] and after hours you can reach the  call-center for on-call physician at [phone]          Leanne,  Your primary care physican's office ordered [facility] ([phone] Fax: [phone]) RN, PT and HHA services for prior to your surgery.  Our care team has been contacted by [facility] and requested we update them upon your discharge so they know when to contact you to begin services.      Please contact [facility] if you do not hear from them within 48 hours of your discharge.      Thank you.

## 2023-04-26 NOTE — ANESTHESIA CARE TRANSFER NOTE
Patient: Leanne Zuñiga    Procedure: Procedure(s):  Lumbar 4-5 Revision Discectomy Left       Diagnosis: Lumbar disc herniation [M51.26]  Diagnosis Additional Information: No value filed.    Anesthesia Type:   General     Note:    Oropharynx: oropharynx clear of all foreign objects  Level of Consciousness: awake          Vital Signs Stable: post-procedure vital signs reviewed and stable  Report to RN Given: handoff report given  Patient transferred to: PACU    Handoff Report: Identifed the Patient, Identified the Reponsible Provider, Reviewed the pertinent medical history, Discussed the surgical course, Reviewed Intra-OP anesthesia mangement and issues during anesthesia, Set expectations for post-procedure period and Allowed opportunity for questions and acknowledgement of understanding      Vitals:  Vitals Value Taken Time   BP     Temp     Pulse     Resp     SpO2         Electronically Signed By: AMBER Coronado CRNA  April 26, 2023  11:48 AM

## 2023-04-26 NOTE — OR NURSING
Pt with a foot drop from previous surgery.   Pt with bruising noted on left quadrant of back  And scattered bruises noted

## 2023-04-26 NOTE — ANESTHESIA POSTPROCEDURE EVALUATION
Patient: Leanne Zuñiga    Procedure: Procedure(s):  Lumbar 4-5 Revision Discectomy Left       Anesthesia Type:  General    Note:  Disposition: Inpatient   Postop Pain Control: Uneventful            Sign Out: Well controlled pain   PONV: No   Neuro/Psych: Uneventful            Sign Out: Acceptable/Baseline neuro status   Airway/Respiratory: Uneventful            Sign Out: Acceptable/Baseline resp. status   CV/Hemodynamics: Uneventful            Sign Out: Acceptable CV status; No obvious hypovolemia; No obvious fluid overload   Other NRE: NONE   DID A NON-ROUTINE EVENT OCCUR? No           Last vitals:  Vitals Value Taken Time   /87 04/26/23 1300   Temp 36.8  C (98.2  F) 04/26/23 1150   Pulse 73 04/26/23 1314   Resp 11 04/26/23 1314   SpO2 94 % 04/26/23 1314   Vitals shown include unvalidated device data.    Electronically Signed By: AMBER Coronado CRNA  April 26, 2023  1:14 PM

## 2023-04-26 NOTE — ANESTHESIA PROCEDURE NOTES
Airway         Procedure Start/Stop Times: 4/26/2023 9:57 AM  Staff -        CRNA: Farzana Augustine APRN CRNA       Performed By: CRNAIndications and Patient Condition       Indications for airway management: torri-procedural       Induction type:intravenous       Mask difficulty assessment: 1 - vent by mask    Final Airway Details       Final airway type: endotracheal airway       Successful airway: ETT - single  Endotracheal Airway Details        ETT size (mm): 7.0       Cuffed: yes       Successful intubation technique: video laryngoscopy       VL Blade Size: Luo 3       Grade View of Cords: 2       Adjucts: stylet       Position: Right       Measured from: lips       Secured at (cm): 21       Bite block used: None    Post intubation assessment        Placement verified by: capnometry and equal breath sounds        Number of attempts at approach: 1       Secured with: plastic tape       Ease of procedure: easy       Dentition: Intact    Medication(s) Administered   Medication Administration Time: 4/26/2023 9:57 AM

## 2023-04-26 NOTE — OR NURSING
Pt with bruise appearance to left low back from where the drape was applied. Clear line of demarcation of Lshape

## 2023-04-26 NOTE — PROGRESS NOTES
Medication Scribe Admission Medication History    Admission medication history is complete. The information provided in this note is only as accurate as the sources available at the time of the update.    Medication reconciliation/reorder completed by provider prior to medication history? Yes    Information Source(s): Patient via phone    Pertinent Information:     Changes made to PTA medication list:    Added: Fish oil; potassium 99MG, Diazepam 2 MG    Deleted: Proair. Alpha Lipoic Acid;Asoric Acid,Atarax, IBU,Krill oil.Powered barely grassTryptophan    Changed: Gabapentin 300 MG : 3 capsules QD    Medication Affordability:no       Allergies reviewed with patient and updates made in EHR: yes    Medication History Completed By: Jie Hogue 4/26/2023 3:46 PM    Prior to Admission medications    Medication Sig Last Dose Taking? Auth Provider Long Term End Date   acetaminophen (TYLENOL) 500 MG tablet Take 500 mg by mouth every 4 hours as needed for pain 4/26/2023 at 0630 Yes Candace Rizvi MD     calcium-magnesium (CALMAG) 500-250 MG TABS Take 1 tablet by mouth daily Past Month Yes Reported, Patient     Cholecalciferol (VITAMIN D) 2000 UNITS tablet Take 10,000-20,000 Units by mouth daily Twice weekly Past Month Yes Candace Rizvi MD     diazepam (VALIUM) 5 MG tablet Take 1 tablet (5 mg) by mouth every 12 hours as needed for anxiety or sleep Past Week Yes Nate Raines MD     gabapentin (NEURONTIN) 100 MG capsule Take 300 mg by mouth 3 times daily 4/26/2023 at 0630 Yes Reported, Patient Yes    HYDROcodone-acetaminophen (NORCO) 5-325 MG tablet Take 1-2 tablets by mouth every 4 hours as needed for moderate to severe pain  Yes Jp Calixto MD     hydrOXYzine (ATARAX) 10 MG tablet Take 1 tablet (10 mg) by mouth every 6 hours as needed for itching (and nausea)  Yes Jp Calixto MD     ketoconazole (NIZORAL) 2 % external cream Apply to foot BID x 14 days More than a month Yes  Misty Pro, MELLISA     niacin 500 MG tablet Take 1 tablet (500 mg) by mouth daily (with breakfast) Takes 2000 mg daily Past Month Yes Candace Rizvi MD     Omega-3 Fatty Acids (FISH OIL OMEGA-3 PO) Take 1 teaspoonful by mouth every morning Past Week Yes Reported, Patient     OVER-THE-COUNTER Tumeric Past Month Yes Reported, Patient     OVER-THE-COUNTER Megastress B with vitamin C 2 tablets daily Past Month Yes Candace Rizvi MD     oxyCODONE (ROXICODONE) 5 MG tablet Take 5 mg by mouth 4/23/2023 Yes Reported, Patient     potassium 99 MG TABS Take 1 tablet by mouth daily 4/25/2023 at am Yes Reported, Patient     predniSONE (DELTASONE) 5 MG tablet Take 15 mg by mouth daily Take one tablet once daily with a meal. Increase to two tablets daily and taper over one week as needed for flare ups 4/26/2023 at am Yes Reported, Patient     tiZANidine (ZANAFLEX) 2 MG tablet TAKE 1 TABLET BY MOUTH EVERY 6 HOURS AS NEEDED FOR MUSCLE SPASM 4/25/2023 at am Yes Reported, Patient     predniSONE (DELTASONE) 1 MG tablet Take 4 tablets (4 mg) by mouth daily Nate De Leon MD

## 2023-04-26 NOTE — OP NOTE
Orthopedic  Operative Note    Pre-operative diagnosis: Lumbar disc herniation [M51.26]    Post-operative diagnosis: 1) Status post prior L4-5 decompression   2) Left L4-5 disc extrusion    Procedure: 1) Revision left L4-5 discectomy with extension of prior left L4, L5 laminotomies and partial medial facetectomy   2) Use of intraoperative microscope    Surgeon: Jp Calixto MD    Assistant(s): None    Anesthesia: General endotracheal anesthesia and Local anesthesia    Estimated blood loss: 30mL     Drains: None    Specimens: None    Indications:                               Elli is a pleasant 73yo female who has history of prior lumbar decompression surgery. She also has history of chronic, flaccid right foot drop. She developed left lumbar radicular pain and was concerned that she might end up with a foot drop on that side. She was seen by one of my partners who ordered an MRI of the lumbar spine and she was found to have caudally extruded left L4-5 disc herniation. She was treated conservatively with medication, activity mod, SATYA. She had some mild relief, but ultimately the pain did not subside and she is very active (as a matter of necessity where she lives) so it was functionally debilitating. She eventually opted for surgical decompression.    I discussed the risks of surgery with the the patient. There is the risk of general anesthesia, which is a risk for heart attach, stroke, respiratory compromise, death. There is a risk of all surgeries being bleeding or infection. In the case of spine surgery either of these could necessitate return to the OR in worst case scenario. There is a risk of recurrent disc herniation or failure to completely alleviate current symptoms. Also a risk of nerve root injury, temporary or permanent, which could cause pain, paresthesias/dysesthesias or weakness. A risk for dural tear with persistent CSF leak. Having had the discussion above, the patient appears to understand the  risks, intended outcomes, postoperative course and agrees to proceed with surgery.     Findings: Small left paracentral and caudally extruded L4-5 disc herniation    Complications: None     Procedure Detail: The patient was seen in the preoperative holding area. The patient was again given the opportunity to ask questions regarding procedural detail, risks and benefits, expected post-operative recovery. All questions were answered and informed consent was signed. The low back was marked with indelible ink at the anticipated level on the left side of her prior surgical scar. The patient was then transferred back to the operative suite on a gurney. After satisfactory general anesthesia, the patient was carefully placed prone onto the Abebe frame. All bony prominences were well-padded. The back was prepped and draped in the usual sterile manner. Prior to incision, a critical pause was observed to identify the correct patient, correct procedure, correct level and that appropriate imaging studies were available. I also confirmed that the patient received appropriate pre-operative prophylactic antibiotics. The patient had received 2g Ancvef IV. All in the room were in agreement.  An 18 gauge spinal needle and its trochar were placed through the skin at the anticipated level and a lateral C-arm imagine was taken to verify the starting point for the skin incision. A maker was used to driss out the incision.     The skin and subcutaneous tissue was incised with 10-blade. Further dissection with electrocautery was used to reach the paraspinal fascia.  The fascia was incised on the left side of midline. Baker and bovie used to subperiosteally dissect the paraspinal muscles off the left of the spine exposing the lamina. Scar tissue and prior laminotomy is noted here. A metallic instrument was held up to the presumed left hemilamina of L4 and another lateral C-arm image taken to confirm this was correct. A high speed luigi was  used to make a permanent driss on the lamina.      Next, while using an operating microscope for visualization, I used a curette to define the previous bony margins of laminotomy and medial facetectomy. Once complete a high speed luigi was then used to widen the inferior laminotomy of L4, superior laminotomy of L5 and medial facetectomy by approximately 2-3 mm in all directions. The lateral extent of the bony decompression was intended to be lateral enough to be flush with the medial L5 pedicle. Kerrison rongeur was used to remove the ligamentum flavum and additional lamina. The majority of the facet joint and the pars is preserved for stability purposes. The traversing L5 was identified and was retracted medially with a ball probe to bluntly dissect it off the ventral canal. I noted a caudally extruded disc herniation from the disc space. A nerve root retractor was placed to hold this nerve medial and then I used the 15 blade to make annulotomy. A short and medium ball-tipped probe were used to tease out the soft disc material and loose fragments as well as pituitary. The actual posterior annulus was somewhat diffusely bulging posterior, but may have also been causing some lateral recess narrowing as well to affect the L5 nerve. That said I undercut the facet some additional to ensure I had decompressed over the shoulder of the nerve. I then use the 15 blade to make annulotomy here as well. Again small ball and pituitary were used to remove some additional nuclear material. At this point I could pass the génesis probe along the lateral recess with ease and the traversing nerve and common dural tube was free to mobilize at this level now.  The disk space and epidural space was carefully irrigated and probed, finding no evidence of further impinging disk disease.  Hemostasis was achieved with Surgiflo and bipolar cautery and the wound was copiously irrigated again. 0.25% Marcaine was injected into the paraspinal  muscles and subcutaneous tissue at the surgical site for post-operative pain relief.  The wound was then closed carefully using #1 Vicryl suture in the paraspinal fascia, 2-0 Vicryl in the deep dermal layer and 3-0 stratafix in subcuticular layer.  Steri-Strips and sterile dressings are applied. All sponge and needle counts were correct in the end. The patient appeared to tolerate the procedure well and was escorted the recovery room in satisfactory condition.            Condition: Stable     Weight bearing status: Weight bearing as tolerated     Activity:        Anticoagulation plan:    Plan:      Jp Calixto MD  [facility]  Date:  4/26/2023  11:58 AM   Activity as tolerated. Patient may move about with assist as indicated or with supervision. No lifting >10lbs. Avoid excessive bending/twisting through low back.    Ambulation and mechanical prophylaxis.    Admit to outpatient in bed. Periop Ancef. Multimodal pain control. PT consult. Anticipate discharge home tomorrow.

## 2023-04-27 ENCOUNTER — APPOINTMENT (OUTPATIENT)
Dept: PHYSICAL THERAPY | Facility: CLINIC | Age: 73
DRG: 519 | End: 2023-04-27
Attending: ORTHOPAEDIC SURGERY
Payer: MEDICARE

## 2023-04-27 PROCEDURE — 250N000011 HC RX IP 250 OP 636: Performed by: ORTHOPAEDIC SURGERY

## 2023-04-27 PROCEDURE — 250N000013 HC RX MED GY IP 250 OP 250 PS 637: Performed by: ORTHOPAEDIC SURGERY

## 2023-04-27 PROCEDURE — 250N000013 HC RX MED GY IP 250 OP 250 PS 637: Performed by: PHYSICIAN ASSISTANT

## 2023-04-27 PROCEDURE — 97161 PT EVAL LOW COMPLEX 20 MIN: CPT | Mod: GP | Performed by: PHYSICAL THERAPIST

## 2023-04-27 PROCEDURE — 258N000003 HC RX IP 258 OP 636: Performed by: ORTHOPAEDIC SURGERY

## 2023-04-27 PROCEDURE — 250N000012 HC RX MED GY IP 250 OP 636 PS 637: Performed by: ORTHOPAEDIC SURGERY

## 2023-04-27 PROCEDURE — 250N000013 HC RX MED GY IP 250 OP 250 PS 637: Performed by: INTERNAL MEDICINE

## 2023-04-27 PROCEDURE — 97530 THERAPEUTIC ACTIVITIES: CPT | Mod: GP | Performed by: PHYSICAL THERAPIST

## 2023-04-27 PROCEDURE — 99203 OFFICE O/P NEW LOW 30 MIN: CPT

## 2023-04-27 RX ORDER — OXYCODONE HYDROCHLORIDE 5 MG/1
5-10 TABLET ORAL EVERY 4 HOURS PRN
Status: DISCONTINUED | OUTPATIENT
Start: 2023-04-27 | End: 2023-04-28

## 2023-04-27 RX ORDER — TIZANIDINE 2 MG/1
2 TABLET ORAL EVERY 8 HOURS PRN
Status: DISCONTINUED | OUTPATIENT
Start: 2023-04-27 | End: 2023-04-28

## 2023-04-27 RX ORDER — DIAZEPAM 2 MG
2 TABLET ORAL EVERY 12 HOURS PRN
Status: DISCONTINUED | OUTPATIENT
Start: 2023-04-27 | End: 2023-04-27

## 2023-04-27 RX ADMIN — OXYCODONE HYDROCHLORIDE 10 MG: 5 TABLET ORAL at 22:01

## 2023-04-27 RX ADMIN — CALCIUM CARBONATE (ANTACID) CHEW TAB 500 MG 500 MG: 500 CHEW TAB at 03:31

## 2023-04-27 RX ADMIN — HYDROXYZINE HYDROCHLORIDE 10 MG: 10 TABLET ORAL at 06:25

## 2023-04-27 RX ADMIN — OXYCODONE HYDROCHLORIDE 10 MG: 5 TABLET ORAL at 11:57

## 2023-04-27 RX ADMIN — GABAPENTIN 300 MG: 300 CAPSULE ORAL at 14:24

## 2023-04-27 RX ADMIN — SENNOSIDES AND DOCUSATE SODIUM 1 TABLET: 8.6; 5 TABLET ORAL at 19:34

## 2023-04-27 RX ADMIN — HYDROXYZINE HYDROCHLORIDE 10 MG: 10 TABLET ORAL at 14:27

## 2023-04-27 RX ADMIN — POLYETHYLENE GLYCOL 3350 17 G: 17 POWDER, FOR SOLUTION ORAL at 09:04

## 2023-04-27 RX ADMIN — OXYCODONE HYDROCHLORIDE 10 MG: 5 TABLET ORAL at 17:55

## 2023-04-27 RX ADMIN — SODIUM CHLORIDE: 9 INJECTION, SOLUTION INTRAVENOUS at 00:42

## 2023-04-27 RX ADMIN — DIAZEPAM 5 MG: 5 TABLET ORAL at 01:03

## 2023-04-27 RX ADMIN — SENNOSIDES AND DOCUSATE SODIUM 1 TABLET: 8.6; 5 TABLET ORAL at 09:04

## 2023-04-27 RX ADMIN — DIAZEPAM 5 MG: 5 TABLET ORAL at 20:08

## 2023-04-27 RX ADMIN — CEFAZOLIN 1 G: 1 INJECTION, POWDER, FOR SOLUTION INTRAMUSCULAR; INTRAVENOUS at 01:43

## 2023-04-27 RX ADMIN — TIZANIDINE 2 MG: 2 TABLET ORAL at 09:04

## 2023-04-27 RX ADMIN — HYDROCODONE BITARTRATE AND ACETAMINOPHEN 2 TABLET: 5; 325 TABLET ORAL at 00:42

## 2023-04-27 RX ADMIN — SODIUM CHLORIDE: 9 INJECTION, SOLUTION INTRAVENOUS at 11:58

## 2023-04-27 RX ADMIN — GABAPENTIN 300 MG: 300 CAPSULE ORAL at 09:04

## 2023-04-27 RX ADMIN — HYDROCODONE BITARTRATE AND ACETAMINOPHEN 2 TABLET: 5; 325 TABLET ORAL at 06:22

## 2023-04-27 RX ADMIN — TIZANIDINE 2 MG: 2 TABLET ORAL at 22:01

## 2023-04-27 RX ADMIN — PREDNISONE 15 MG: 10 TABLET ORAL at 09:04

## 2023-04-27 RX ADMIN — GABAPENTIN 300 MG: 300 CAPSULE ORAL at 19:34

## 2023-04-27 RX ADMIN — HYDROXYZINE HYDROCHLORIDE 10 MG: 10 TABLET ORAL at 22:08

## 2023-04-27 ASSESSMENT — ACTIVITIES OF DAILY LIVING (ADL)
ADLS_ACUITY_SCORE: 28
DEPENDENT_IADLS:: CLEANING;COOKING;LAUNDRY;SHOPPING;TRANSPORTATION
ADLS_ACUITY_SCORE: 28
ADLS_ACUITY_SCORE: 24
ADLS_ACUITY_SCORE: 24
ADLS_ACUITY_SCORE: 28
ADLS_ACUITY_SCORE: 25
ADLS_ACUITY_SCORE: 28
ADLS_ACUITY_SCORE: 24
ADLS_ACUITY_SCORE: 28
ADLS_ACUITY_SCORE: 25

## 2023-04-27 NOTE — PROGRESS NOTES
[facility] Medicine Progress Note  Date of Service: 04/27/2023    Assessment & Plan   Leanne Zuñiga is a 72 year old female who presented on 4/26/2023 for scheduled Procedure(s):  Lumbar 4-5 Revision Discectomy Left by Jp Calixto MD and is being followed by the hospital medicine service for co-management of acute and/or chronic perioperative medical problems.      S/p Procedure(s):  Lumbar 4-5 Revision Discectomy Left   1 Day Post-Op    - Pain control, wound cares, physical therapy, occupational therapy and DVT prophylaxis per orthopedic surgery service    Rheumatoid Arthritis  History of RA since 2014. Last rheumatology appointment from review of charts 6/1/22 indicates that she is being treated with chronic prednisone therapy.  - Continue taking prednisone 4 mg once daily.  - Follow-up with rheumatology.     DVT Prophylaxis: as per orthopedic surgery service - Low Risk/Ambulatory with no VTE prophylaxis indicated  Code Status: Full Code    Lines: None   Lazar catheter: None    Discussion: From a medical standpoint the patient is doing well and does not have any limitations for discharge.    Disposition: Anticipate discharge 04/28/23     Attestation:  I have reviewed today's vital signs, notes, medications, labs and imaging.    NILES DWYER PA-C     Interval History   Patient reports having right sided lumbar pain after ambulating to restroom this morning. She notes this pain hich is similar to what she was experiencing prior to surgery, radiculopathy. Now having mild left sided lumbar pain.     Physical Exam   Temp:  [97  F (36.1  C)-98.2  F (36.8  C)] 98.2  F (36.8  C)  Pulse:  [58-89] 61  Resp:  [5-18] 18  BP: (121-154)/(61-89) 122/61  SpO2:  [92 %-99 %] 99 %    Weights: There were no vitals filed for this visit. There is no height or weight on file to calculate BMI.    General: Alert, oriented, no acute distress, sitting upright in bed.  CV: Regular rate  and rhythm,   Respiratory: CTA bilaterally, normal effort  GI: Soft, nontender, flat, normoactive bowel sounds.  Skin: Warm and dry  Musculoskeletal: Surgical site exam deferred to orthopedic team.  Neuro: Sensation to lower extremities equal bilaterally. Foot drop to right foot that she states is old.    Data   Unresulted Labs Ordered in the Past 30 Days of this Admission     No orders found for last 31 day(s).         Imaging  Recent Results (from the past 24 hour(s))   XR Surgery JESSY L/T 5 Min Fluoro w Stills    Narrative    This exam was marked as non-reportable because it will not be read by a   radiologist or a [place] non-radiologist provider.           I reviewed all new labs and imaging results over the last 24 hours. I personally reviewed no images or EKG's today.    Medications     sodium chloride 100 mL/hr at 04/27/23 0042       gabapentin  300 mg Oral TID     polyethylene glycol  17 g Oral Daily     predniSONE  15 mg Oral Daily     senna-docusate  1 tablet Oral BID     sodium chloride (PF)  3 mL Intracatheter Q8H       NILES DWYER PA-C

## 2023-04-27 NOTE — CONSULTS
Care Management Initial Consult    General Information  Assessment completed with: Patient,    Type of CM/SW Visit: Initial Assessment    Primary Care Provider verified and updated as needed: Yes   Readmission within the last 30 days: no previous admission in last 30 days      Reason for Consult: discharge planning  Advance Care Planning:            Communication Assessment  Patient's communication style: spoken language (English or Bilingual)    Hearing Difficulty or Deaf: no   Wear Glasses or Blind: yes    Cognitive  Cognitive/Neuro/Behavioral: WDL  Level of Consciousness: alert        Mood/Behavior: calm, cooperative          Living Environment:   People in home: alone     Current living Arrangements: house      Able to return to prior arrangements: other (see comments) (patient does not feel she can safely go home)       Family/Social Support:  Care provided by: self  Provides care for: pet(s)  Marital Status: Single  Limited to (friend Adelia)          Description of Support System: Supportive    Support Assessment: Lacks adequate physical care    Current Resources:   Patient receiving home care services: Yes  Skilled Home Care Services: Skilled Nursing, Physical Therapy, Home Health Aid  Community Resources: None  Equipment currently used at home: cane, straight, orthosis  Supplies currently used at home: None    Employment/Financial:  Employment Status: retired        Financial Concerns: No concerns identified   Referral to Financial Worker: No       Does the patient's insurance plan have a 3 day qualifying hospital stay waiver?      Lifestyle & Psychosocial Needs:  Social Determinants of Health     Tobacco Use: Medium Risk (4/27/2023)    Patient History      Smoking Tobacco Use: Former      Smokeless Tobacco Use: Never      Passive Exposure: Not on file   Alcohol Use: Not on file   Financial Resource Strain: Not on file   Food Insecurity: Not on file   Transportation Needs: Not on file   Physical Activity:  Not on file   Stress: Not on file   Social Connections: Not on file   Intimate Partner Violence: Not on file   Depression: Not on file   Housing Stability: Not on file       Functional Status:  Prior to admission patient needed assistance:   Dependent ADLs:: Ambulation-cane  Dependent IADLs:: Cleaning, Cooking, Laundry, Shopping, Transportation  Assesssment of Functional Status: Not at  functional baseline, Needs assistance with meals, Needs assistance with laundry/houskeeping, Needs assistance with bathing, Needs assistance with shopping    Mental Health Status:          Chemical Dependency Status:                Values/Beliefs:  Spiritual, Cultural Beliefs, Quaker Practices, Values that affect care:                 Additional Information:    Writer talked with patient over the phone introduced self and role. Patient lives alone with her 4 cats in a one level home alone states she has no family, an eighty year old neighbor and a friend Adelia that will transport her when she is ready but unable to care for her as Adelia cares for her own father.    Adelia gets off work at 1430 today and patient will need to call her if she needs a ride.    Patient has started with [facility] ([phone] Fax: [phone]) RN/PT/HHA but does not feel that will be enough when she goes home and would like writer to look for a rehab bed for a couple weeks until she is back on her feet. Writer went over a list of TCU's and patient is willing to have writer send out referrals.     Writer informed patient to call [facility] for help with cleaning her home and patient is aware and has their number at home.    Destination    Service Provider Request Status Selected Services Address Phone Fax Patient Preferred   [facility] [place] - REFERRAL ONLY (SNF)  Pending - Request Sent N/A [id number] [facility] [address] [phone] --   [facility] [place] ([facility])  Pending - Request Sent N/A [address] , [facility] [address] [phone] --   [facility] [place] ([facility])  Pending - [facility] Sent N/A [address] [facility] [facility] [id number] [phone] --   [facility] [place] ()  Pending - Request Sent N/A [address] [facility] [address] [phone] --   [facility] ([facility])  Pending - Request Sent N/A [address] , [facility] [id number] [phone] --         Jennifer Bañuelos RN

## 2023-04-27 NOTE — PROGRESS NOTES
Patient alert and oriented. Vitals stable; on room air. Pain in lower back 4-5/10, partially relieved with PRN norco, dilaudid, and ice. Patient ambulates with Ax1, walker, and gait belt. Good appetite. Voiding spontaneously. PIV infusing.    Temp: 98.1  F (36.7  C) Temp src: Oral BP: 124/67 Pulse: 68   Resp: 18 SpO2: 97 % O2 Device: None (Room air) Oxygen Delivery: 2 LPM

## 2023-04-27 NOTE — PROGRESS NOTES
Patient complained of pain 7/10. 10 mg PRN oxycodone given at 1755. Drainage marked on incision dressing; no change.

## 2023-04-27 NOTE — PROGRESS NOTES
04/27/23 0840   Appointment Info   Signing Clinician's Name / Credentials (PT) Eva Fitch, PT   Quick Adds   Quick Adds Certification   Living Environment   People in Home alone   Current Living Arrangements mobile home   Home Accessibility stairs to enter home;stairs within home   Number of Stairs, Main Entrance 4   Number of Stairs, Within Home, Primary one   Living Environment Comments 1 step down to get to her bathroom inside, reports she has a deep tub, no shower, has sink next to toilet, plans to sponge bathe, has a FWW and was using it prior or using furniture   Self-Care   Usual Activity Tolerance poor   Current Activity Tolerance poor   Regular Exercise No   Activity/Exercise/Self-Care Comment R CALIO, has beel sleeping on the fouton so only has a few feet to go to the bathroom, washes clothes in her sink, has not been able to clean or vacuum, cares for her 4 cats, thinks her friend might be able to help clean the litter box otherwise no help at home, has checked into getting home making help but would have to private pay pt reports   General Information   Onset of Illness/Injury or Date of Surgery 04/26/23   Referring Physician Jp Calixto MD   Patient/Family Therapy Goals Statement (PT) to get more help at home such as help with laundry and cleaning, reports [facility] was supposed to be set up   Pertinent History of Current Problem (include personal factors and/or comorbidities that impact the POC) lumbar disc herniation, s/p Revision left L4-5 discectomy with extension of prior left L4, L5 laminotomies and partial medial facetectomy   Existing Precautions/Restrictions   (No lifting >10lbs. Avoid excessive bending/twisting through low back)   Cognition   Affect/Mental Status (Cognition) WNL   Orientation Status (Cognition) oriented x 4   Pain Assessment   Patient Currently in Pain Yes, see Vital Sign flowsheet  (concerned that her radiating L hip and buttock pain is the same as pre-op and  was anticipating that pain to be gone)   Range of Motion (ROM)   Range of Motion ROM is WFL   ROM Comment able to demo getting socks on herself in bed flexing hip and knee to get foot up to her   Strength (Manual Muscle Testing)   Strength (Manual Muscle Testing) Deficits observed during functional mobility   Strength Comments general weakness likely present pre-op as pt had very limited mobility and act tolerance pre-op   Bed Mobility   Comment, (Bed Mobility) mod I supine<>sit with rail, sleeps on side at baseline not on her back as it is not tolerated   Transfers   Comment, (Transfers) S with walker sit<>stand, sitting EOB pt will lean R reporting increased L sided pain when sitting upright   Gait/Stairs (Locomotion)   [place] Level (Gait) supervision   Assistive Device (Gait) walker, front-wheeled   Distance in Feet 5' and 8'   Pattern (Gait) step-to   Comment, (Gait/Stairs) small steps, flexed with increased UE WB on walker, no LOB but guarded and reports increased pain 6-7/10 with movement, steppage pattern on R due to foot drop and declined donning AFO, reports at home she will go without the AFO   Clinical Impression   Criteria for Skilled Therapeutic Intervention Yes, treatment indicated   PT Diagnosis (PT) impaired functional mobility   Influenced by the following impairments pain, weakness, R foot drop   Functional limitations due to impairments impaired gait, decreased activity tolerance   Clinical Presentation (PT Evaluation Complexity) Stable/Uncomplicated   Clinical Presentation Rationale clinical judgement   Clinical Decision Making (Complexity) low complexity   Planned Therapy Interventions (PT) gait training;patient/family education;postural re-education;stair training;strengthening;home program guidelines;progressive activity/exercise   Risk & Benefits of therapy have been explained evaluation/treatment results reviewed;care plan/treatment goals reviewed;risks/benefits  reviewed;current/potential barriers reviewed;participants voiced agreement with care plan;patient   PT Total Evaluation Time   PT Eval, Low Complexity Minutes (28952) 10   Plan of Care Review   Plan of Care Reviewed With patient   Therapy Certification   Start of care date 04/27/23   Certification date from 04/27/23   Certification date to 04/30/23   Medical Diagnosis s/p lumbar discectomy   Physical Therapy Goals   PT Frequency Daily   PT Predicted Duration/Target Date for Goal Attainment 04/30/23   PT Goals Gait;Stairs   PT: Gait Modified independent;Rolling walker;25 feet   PT: Stairs Minimal assist;4 stairs;Rail on right   Interventions   Interventions Quick Adds Therapeutic Activity   Therapeutic Activity   Therapeutic Activities: dynamic activities to improve functional performance Minutes (97079) 25   PT Discharge Planning   PT Discharge Recommendation (DC Rec) home with home care physical therapy;home with assist   PT Rationale for DC Rec reports was supposed to be getting set up with HC through her PCP but has not started ([facility]), would benefit from assist at home but reports has no one, has 4 cats, does not have laundry so washes clothes in her sink, reports she wants homemaking service resources if has to private pay   PT Brief overview of current status bed mob indep, transfers S with walker, increased pain when moves, able to do 1 step but flexed over in pain, R chronic foot drop, walked in room short distance only which is her baseline   Total Session Time   Timed Code Treatment Minutes 25   Total Session Time (sum of timed and untimed services) 35

## 2023-04-27 NOTE — PLAN OF CARE
Patient vital signs are at baseline: Yes  Patient able to ambulate as they were prior to admission or with assist devices provided by therapies during their stay:  Yes, using walker  Patient MUST void prior to discharge:  Yes  Patient able to tolerate oral intake:  Yes  Pain has adequate pain control using Oral analgesics:  Yes, pain comes down to 4.5/10 after 2 norco.   Does patient have an identified :  No,  Reason: She reports does not have anyone at home to help her.   Has goal D/C date and time been discussed with patient:  Yes, but she feels like she needs another day in the hospital.

## 2023-04-27 NOTE — PROGRESS NOTES
[facility]      OUTPATIENT PHYSICAL THERAPY EVALUATION  PLAN OF TREATMENT FOR OUTPATIENT REHABILITATION  (COMPLETE FOR INITIAL CLAIMS ONLY)  Patient's Last Name, First Name, M.I.  YOB: 1950  Leanne Zuñiga                        Provider's Name  [facility] Medical Record No.  [id number]                               Onset Date:  04/26/23   Start of Care Date:  04/27/23      Type:     _X_PT   ___OT   ___SLP Medical Diagnosis:  s/p lumbar discectomy                        PT Diagnosis:  impaired functional mobility   Visits from SOC:  1   _________________________________________________________________________________  Plan of Treatment/Functional Goals    Planned Interventions: gait training, patient/family education, postural re-education, stair training, strengthening, home program guidelines, progressive activity/exercise     Goals: See Physical Therapy Goals on Care Plan in [employer] electronic health record.    Therapy Frequency: Daily  Predicted Duration of Therapy Intervention: 04/30/23  _________________________________________________________________________________    I CERTIFY THE NEED FOR THESE SERVICES FURNISHED UNDER        THIS PLAN OF TREATMENT AND WHILE UNDER MY CARE     (Physician co-signature of this document indicates review and certification of the therapy plan).                Certification date from: 04/27/23, Certification date to: 04/30/23    Referring Physician: Jp Calixto MD            Initial Assessment        See Physical Therapy evaluation dated 04/27/23 in Epic electronic health record.

## 2023-04-27 NOTE — PROGRESS NOTES
"Addendum:  SW received denials from 3 of the 5 TCU stating pt is near baseline, that PT identifies that pt can return home with home care & that Ortho NP's note stated pt to discharge to home with home care once pain is controlled.    ANDRES met with pt at bedside, introduced self, title & role.  SW informed pt that previous Jennifer ZUNIGA had sent referrals to TCUs as pt had requested, but as of the time of this encounter, 3 facilities have declined to admit for cares based on PT, RN & NP notes.  Pt became very upset, demanding to know, \"who the f@&k is lying and saying I am walking OK?\"  SW corrected pt and said that with pain control and another night in the hospital, it is hopeful that pt will be able to return home with home care services.    Pt stated she wants to go to a TCU for a few weeks care.  ANDRES explained that of the 5 facilities that were sent referrals, 3 have declined as of now.  However, if pt continues with pain & PT sees pt again tomorrow & changes their recommendation, this writer will more than gladly send updated referrals to the facilities.  Pt very upset and angry with SW and angry that she cannot discharge to a TCU, \"can't you see, I am in so much #$@#$ pain?\"  SW apologized for pt's pain & assured the pt that our care team will address her pain needs prior to discharge in hopes that she will be comforted with this news.    ANDRES educated on Medicare.gov and criteria for TCU coverage.  Explained that 1 facility sent her referral to the DON to review for criteria and found none.     ANDRES did discuss [facility] ([phone] Fax: [phone]), that CLEMENTE Mccann called and will have RN, PT, & HHA set up for weekend admission to see pt at home.  PT again cussing at this writer, stating she doesn't need a RN, PT or HHA to see her at home, she needs a f$#tiffanie .  SW explained that I am happy to assist with chore services, but those are privately paid for or covered by a county waiver.  Pt stated " she knows all of this, but cannot afford to pay for a house- & is over income by $22 for the waiver.  SW offered to call [facility] and [facility] to discuss their home companion & volunteer program, but pt began cussing at writer again, calling this writer inept and telling this writer that she will just discharge to home, fall and break her f#$tiffanie neck.      ANDRES informed pt that SW will speak with medical team in the AM, request PT to reassess and resend TCU referrals if needed.    ANDRES then notified Charge RN that pt may need additional pain relief based on writer's conversation.    HOLLIE Pearl on 4/27/2023 at 5:00 PM        Care Management Follow Up    Length of Stay (days): 0    Expected Discharge Date: 04/27/2023     Concerns to be Addressed: discharge planning     Patient plan of care discussed at interdisciplinary rounds: Yes    Anticipated Discharge Disposition: Skilled Nursing Facility vs [facility]     Anticipated Discharge Services:  Community Resources for Chore Services  Anticipated Discharge DME: None    Patient/family educated on Medicare website which has current facility and service quality ratings: yes  Education Provided on the Discharge Plan: Yes  Patient/Family in Agreement with the Plan: yes    Referrals Placed by CM/SW: Post Acute Facilities, External Care Coordination  Private pay costs discussed: Community resources for housekeeping    Additional Information:  ANDRES received a phone call from Miles Mccann RN with [facility] services, [phone], inquiring on pt's status.    ANDRES informed that PT assessed pt today and recommended home with home care services upon discharge.  Ortho PA also recommends home with home care services today vs tomorrow.    [facility], stated that pt will have home care RN, PT & HHA services that will start within 48 hours of discharge from hospital.  Orders were placed by pt's PCP and no orders needed from care team  at hospital as pt is not OBS or INPT status.    ANDRES informed Siobhan that TCU referrals are pending, but in the even the pt does not get accepted into a TCU for cares, home care will be the plan of care.  Siobhan requested update by tomorrow.  ANDRES will notify Siobhan via phone at [phone] with pt's discharge status.      Care Management team to follow for discharge plans.    HOLLIE Pearl

## 2023-04-27 NOTE — PROGRESS NOTES
[facility] Orthopaedics Progress Note      Post-operative Day: 1 Day Post-Op    Procedure(s):  Lumbar 4-5 Revision Discectomy Left  Subjective:    Pt states that her pain is severe, going from her back down the left leg in the same pattern as it was prior to surgery whenever she moves. She doesn't feel like PT went well and doesn't feel ready to go home. She states that she doesn't have help at home and she needs someone to clean her house. She was using oxycodone at home as well as [place] for pain.    Chest pain, SOB:  No  Nausea, vomiting:  No  Lightheadedness, dizziness:  No  Neuro:  Patient denies new onset numbness or paresthesias      Objective:  Blood pressure 122/61, pulse 61, temperature 98.2  F (36.8  C), temperature source Oral, resp. rate 18, SpO2 99 %.    Patient Vitals for the past 24 hrs:   BP Temp Temp src Pulse Resp SpO2   04/27/23 0731 122/61 98.2  F (36.8  C) Oral 61 18 99 %   04/26/23 2311 124/67 98.1  F (36.7  C) Oral 68 18 97 %   04/26/23 1835 125/68 -- -- 81 16 97 %   04/26/23 1640 133/71 -- -- 89 18 97 %   04/26/23 1514 139/70 -- -- 80 16 96 %   04/26/23 1500 -- 97.3  F (36.3  C) Oral -- -- --   04/26/23 1456 139/83 -- -- 60 16 97 %   04/26/23 1424 (!) 154/73 -- -- 63 16 --   04/26/23 1356 (!) 150/89 97  F (36.1  C) Oral 71 16 99 %   04/26/23 1330 134/81 -- -- 67 -- 96 %   04/26/23 1319 -- -- -- -- -- 96 %   04/26/23 1315 127/87 98.1  F (36.7  C) Temporal 58 16 96 %   04/26/23 1302 -- -- -- -- -- 94 %   04/26/23 1300 (!) 140/87 -- -- 67 (!) 9 96 %   04/26/23 1245 129/77 -- -- 89 (!) 5 92 %   04/26/23 1230 137/80 -- -- 79 (!) 9 92 %   04/26/23 1215 (!) 141/82 -- -- 75 10 95 %   04/26/23 1200 121/78 -- -- 74 11 97 %   04/26/23 1152 131/77 -- -- 79 14 96 %   04/26/23 1150 127/77 98.2  F (36.8  C) Temporal 75 14 --       Wt Readings from Last 4 Encounters:   06/01/22 67.6 kg (149 lb)   12/14/17 66.2 kg (146 lb)   10/11/17 67.6 kg (149 lb)   08/09/17 69.1 kg (152 lb 6.4 oz)       Gen: A&O x 3.  NAD. Appears tired. Laying on her right side.  Wound status: Covered, dressings intact with slight drainage.   Circulation, motion and sensation: Moves the bilateral toes; distal lower extremity sensation is intact and equal bilaterally. Foot and toes are warm and well perfused.    Swelling: Mininal  Calf tenderness: calves are soft and non-tender bilaterally     Pertinent Labs   Lab Results: personally reviewed.     Recent Labs   Lab Test 04/12/17  1502 02/08/17  1127 11/14/16  1334 11/04/15  1128 08/19/15  1307   HGB  --  15.8* 15.4 13.5 14.1   HCT  --  47.0 46.0 41.3 43.6   MCV  --  91 90 91 90   PLT  --  355 376 352 401   NA  --  143 141  --  139   CRP 10.5*  --   --   --   --        Plan:   Continue current cares and rehabilitation.  Anticoagulation protocol: Early ambulation   Pain medications: Oxycodone ordered for inpatient, Norco discontinued. Reordered tizanidine as well. Has Valium available.   Weight bearing status:  WBAT  Encouraged pt to work on mobility and activity. Care Management consult placed to check on home care orders; per H&P her PCP was supposedly ordering pt. Pt also states she has a SW through the [facility].   Disposition:  Plan for discharge to home with homme therapy when medically stable and pain is controlled, cleared by therapy. Possibly later today if pain and mobility improve vs tomorrow.  May need TCU if unable to safely discharge home with home care.            Report completed by:  Tyrell Jackson PA-C  Date: 4/27/2023  Time: 10:40 AM

## 2023-04-28 ENCOUNTER — APPOINTMENT (OUTPATIENT)
Dept: PHYSICAL THERAPY | Facility: CLINIC | Age: 73
DRG: 519 | End: 2023-04-28
Attending: ORTHOPAEDIC SURGERY
Payer: MEDICARE

## 2023-04-28 PROCEDURE — 250N000013 HC RX MED GY IP 250 OP 250 PS 637: Performed by: ORTHOPAEDIC SURGERY

## 2023-04-28 PROCEDURE — 97530 THERAPEUTIC ACTIVITIES: CPT | Mod: GP | Performed by: PHYSICAL MEDICINE & REHABILITATION

## 2023-04-28 PROCEDURE — 99231 SBSQ HOSP IP/OBS SF/LOW 25: CPT

## 2023-04-28 PROCEDURE — 120N000001 HC R&B MED SURG/OB

## 2023-04-28 PROCEDURE — 250N000013 HC RX MED GY IP 250 OP 250 PS 637: Performed by: INTERNAL MEDICINE

## 2023-04-28 PROCEDURE — 250N000012 HC RX MED GY IP 250 OP 636 PS 637: Performed by: ORTHOPAEDIC SURGERY

## 2023-04-28 PROCEDURE — 250N000013 HC RX MED GY IP 250 OP 250 PS 637: Performed by: PHYSICIAN ASSISTANT

## 2023-04-28 RX ORDER — OXYCODONE HYDROCHLORIDE 5 MG/1
5-10 TABLET ORAL
Status: DISCONTINUED | OUTPATIENT
Start: 2023-04-28 | End: 2023-04-29 | Stop reason: HOSPADM

## 2023-04-28 RX ORDER — HYDROXYZINE HYDROCHLORIDE 25 MG/1
25-50 TABLET, FILM COATED ORAL EVERY 6 HOURS PRN
Status: DISCONTINUED | OUTPATIENT
Start: 2023-04-28 | End: 2023-04-29 | Stop reason: HOSPADM

## 2023-04-28 RX ORDER — TIZANIDINE 2 MG/1
2-4 TABLET ORAL EVERY 8 HOURS PRN
Status: DISCONTINUED | OUTPATIENT
Start: 2023-04-28 | End: 2023-04-29 | Stop reason: HOSPADM

## 2023-04-28 RX ORDER — HYDROXYZINE HYDROCHLORIDE 25 MG/1
25-50 TABLET, FILM COATED ORAL EVERY 6 HOURS PRN
Start: 2023-04-28

## 2023-04-28 RX ORDER — DIAZEPAM 2 MG
2 TABLET ORAL EVERY 12 HOURS PRN
Qty: 10 TABLET | Refills: 0 | Status: SHIPPED | OUTPATIENT
Start: 2023-04-28

## 2023-04-28 RX ORDER — AMOXICILLIN 250 MG
1 CAPSULE ORAL 2 TIMES DAILY PRN
Start: 2023-04-28

## 2023-04-28 RX ORDER — OXYCODONE HYDROCHLORIDE 5 MG/1
5-10 TABLET ORAL
Qty: 30 TABLET | Refills: 0 | Status: SHIPPED | OUTPATIENT
Start: 2023-04-28

## 2023-04-28 RX ORDER — ACETAMINOPHEN 325 MG/1
650 TABLET ORAL EVERY 4 HOURS PRN
Status: DISCONTINUED | OUTPATIENT
Start: 2023-04-28 | End: 2023-04-29 | Stop reason: HOSPADM

## 2023-04-28 RX ADMIN — HYDROXYZINE HYDROCHLORIDE 50 MG: 25 TABLET, FILM COATED ORAL at 11:05

## 2023-04-28 RX ADMIN — TIZANIDINE 2 MG: 2 TABLET ORAL at 14:49

## 2023-04-28 RX ADMIN — OXYCODONE HYDROCHLORIDE 10 MG: 5 TABLET ORAL at 02:34

## 2023-04-28 RX ADMIN — HYDROXYZINE HYDROCHLORIDE 50 MG: 25 TABLET, FILM COATED ORAL at 17:30

## 2023-04-28 RX ADMIN — SENNOSIDES AND DOCUSATE SODIUM 1 TABLET: 8.6; 5 TABLET ORAL at 20:25

## 2023-04-28 RX ADMIN — POLYETHYLENE GLYCOL 3350 17 G: 17 POWDER, FOR SOLUTION ORAL at 07:51

## 2023-04-28 RX ADMIN — GABAPENTIN 300 MG: 300 CAPSULE ORAL at 14:49

## 2023-04-28 RX ADMIN — SENNOSIDES AND DOCUSATE SODIUM 1 TABLET: 8.6; 5 TABLET ORAL at 07:51

## 2023-04-28 RX ADMIN — OXYCODONE HYDROCHLORIDE 10 MG: 5 TABLET ORAL at 16:36

## 2023-04-28 RX ADMIN — DIAZEPAM 5 MG: 5 TABLET ORAL at 07:51

## 2023-04-28 RX ADMIN — OXYCODONE HYDROCHLORIDE 10 MG: 5 TABLET ORAL at 20:24

## 2023-04-28 RX ADMIN — HYDROXYZINE HYDROCHLORIDE 50 MG: 25 TABLET, FILM COATED ORAL at 23:34

## 2023-04-28 RX ADMIN — DIAZEPAM 5 MG: 5 TABLET ORAL at 20:25

## 2023-04-28 RX ADMIN — PREDNISONE 15 MG: 10 TABLET ORAL at 07:51

## 2023-04-28 RX ADMIN — TIZANIDINE 2 MG: 2 TABLET ORAL at 22:40

## 2023-04-28 RX ADMIN — OXYCODONE HYDROCHLORIDE 10 MG: 5 TABLET ORAL at 13:28

## 2023-04-28 RX ADMIN — OXYCODONE HYDROCHLORIDE 10 MG: 5 TABLET ORAL at 23:34

## 2023-04-28 RX ADMIN — HYDROXYZINE HYDROCHLORIDE 10 MG: 10 TABLET ORAL at 04:48

## 2023-04-28 RX ADMIN — TIZANIDINE 2 MG: 2 TABLET ORAL at 06:16

## 2023-04-28 RX ADMIN — GABAPENTIN 300 MG: 300 CAPSULE ORAL at 07:51

## 2023-04-28 RX ADMIN — OXYCODONE HYDROCHLORIDE 10 MG: 5 TABLET ORAL at 06:16

## 2023-04-28 RX ADMIN — OXYCODONE HYDROCHLORIDE 10 MG: 5 TABLET ORAL at 10:08

## 2023-04-28 RX ADMIN — GABAPENTIN 300 MG: 300 CAPSULE ORAL at 20:24

## 2023-04-28 ASSESSMENT — ACTIVITIES OF DAILY LIVING (ADL)
ADLS_ACUITY_SCORE: 28

## 2023-04-28 NOTE — PROGRESS NOTES
[facility] Medicine Progress Note  Date of Service: 04/28/2023    Assessment & Plan   Leanne Zuñiga is a 72 year old female who presented on 4/26/2023 for scheduled Procedure(s):  Lumbar 4-5 Revision Discectomy Left by Jp Calixto MD and is being followed by the hospital medicine service for co-management of acute and/or chronic perioperative medical problems.      S/p Procedure(s):  Lumbar 4-5 Revision Discectomy Left   2 Days Post-Op    - Pain control, wound cares, physical therapy, occupational therapy and DVT prophylaxis per orthopedic surgery service    Rheumatoid Arthritis  History of RA since 2014. Last rheumatology appointment from review of charts 6/1/22 indicates that she is being treated with chronic prednisone therapy.  - Continue taking prednisone 4 mg once daily.  - Follow-up with rheumatology.      DVT Prophylaxis: as per orthopedic surgery service - Low Risk/Ambulatory with no VTE prophylaxis indicated  Code Status: Full Code    Lines: PIV  Lazar catheter: None    Discussion: Medically, the patient appears well and does not have any barriers to discharge    Disposition: Anticipate discharge 04/29/23     Attestation:  I have reviewed today's vital signs, notes, medications, labs and imaging.    NILES DWYER PA-C       Interval History   Continues to have significant pain that is the same prior to surgery, denies significant pain to surgical sight. Sounds like she is not ambulating much or well. Endorses tolerating PO without N/V, passive flatus, and urinating without difficulty. Denies chest pain, shortness of breath, N/V, and lightheadedness.    Physical Exam   Temp:  [97.9  F (36.6  C)-98.3  F (36.8  C)] 98.3  F (36.8  C)  Pulse:  [71-86] 75  Resp:  [18-19] 18  BP: (102-125)/(53-69) 102/53  SpO2:  [93 %-99 %] 93 %    Weights: There were no vitals filed for this visit. There is no height or weight on file to calculate BMI.    General: Alert,  oriented, mild distress, laying on side in bed.  CV: Regular rate and rhythm, normal S1 and S2, no murmurs appreciated.  Respiratory: CTA bilaterally, normal effort.  GI: Soft, nontender, flat, normoactive bowel sounds.  Skin: Warm and dry  Musculoskeletal: Intact dorsiflexion/plantarflexion, intact sensation to lower extremities, negative Kelsey's sign.      Data   No labs ordered.    Imaging  No results found for this or any previous visit (from the past 24 hour(s)).     I reviewed all new labs and imaging results over the last 24 hours. I personally reviewed no images or EKG's today.    Medications     sodium chloride Stopped (04/27/23 2112)       gabapentin  300 mg Oral TID     polyethylene glycol  17 g Oral Daily     predniSONE  15 mg Oral Daily     senna-docusate  1 tablet Oral BID     sodium chloride (PF)  3 mL Intracatheter Q8H       NILES DWYER PA-C

## 2023-04-28 NOTE — PLAN OF CARE
Patient vital signs are at baseline: Yes  Patient able to ambulate as they were prior to admission or with assist devices provided by therapies during their stay:  No,  Reason: Patient appears to have difficulties ambulating to the bathroom. Moans with every step. Hunches over walker.   Patient able to tolerate oral intake:  Yes  Pain has adequate pain control using Oral analgesics:  No,  Reason:  Moans with ambulating. Sits up in bed, puts socks on without problem. Pain 7-8/10 with ambulating 2.5 hours after receiving 10 mg oxycodone. She reports pain is in middle of left buttocks with ambulation. She reported having this pain before surgery and thought the surgery would help this.   Does patient have an identified :  No,  Reason: She reports, was not able to find anyone, no extra room in home for anyone to stay and she needed surgery because of the pain.   Has goal D/C date and time been discussed with patient:  Yes  Skin: New bruising left torso.

## 2023-04-28 NOTE — PROGRESS NOTES
Patient vital signs are at baseline: Yes  Patient able to ambulate as they were prior to admission or with assist devices provided by therapies during their stay:  Yes,  Reason:  Patient is now able to ambulate to the bathroom and back to bed using a walker with no assistance to sit or stand.    Patient MUST void prior to discharge:  Yes  Patient able to tolerate oral intake:  Yes  Pain has adequate pain control using Oral analgesics:  Yes patients pain is now being well controlled with oral oxycodone, atarax and tizandidine.  MD rounded on patients and increased dosages and frequency and this has helped to control patients pain.   Does patient have an identified :  No,  Reason:  Referrals were sent to TCU but patient has not been accepted.  She will be discharging with home care  Has goal D/C date and time been discussed with patient:  Yes

## 2023-04-28 NOTE — UTILIZATION REVIEW
Admission Status; Secondary Review Determination       Under the authority of the Utilization Management Committee, the utilization review process indicated a secondary review on the above patient. The review outcome is based on review of the medical records, discussions with staff, and applying clinical experience noted on the date of the review.     (x) Inpatient Status Appropriate - This patient's medical care is consistent with medical management for inpatient care and reasonable inpatient medical practice.     RATIONALE FOR DETERMINATION   72-year-old female who presented for a scheduled Lumbar 4-5 Revision Discectomy Left . She is currently being followed by the hospital medicine service for co-management of perioperative medical problems. The patient is 2 days post-op and is receiving pain control, wound care, physical therapy, occupational therapy, and DVT prophylaxis per orthopedic surgery service. The patient has a history of Rheumatoid Arthritis since 2014 and is currently being treated with chronic prednisone therapy. She is advised to continue taking prednisone 4 mg once daily. The patient reports ongoing low back pain that worsens with standing, moving, and sitting on the toilet. The pain radiates down the left lateral leg and is similar to her pain prior to surgery. She is concerned about her ability to manage at home with her movement restrictions.    The expected length of stay at the time of admission was more than 2 nights because of the severity of illness, intensity of service provided, and risk for adverse outcome. Inpatient admission is appropriate.     Dr. Calixto notified         This document was produced using voice recognition software       The information on this document is developed by the utilization review team in order for the business office to ensure compliance. This only denotes the appropriateness of proper admission status and does not reflect the quality of care rendered.    The definitions of Inpatient Status and Observation Status used in making the determination above are those provided in the CMS Coverage Manual, Chapter 1 and Chapter 6, section 70.4.   Sincerely,   SHANNAN STOLL MD   System Medical Director   Utilization Management   [facility].

## 2023-04-28 NOTE — PROGRESS NOTES
[facility] Orthopaedics Progress Note      Post-operative Day: 2 Days Post-Op    Procedure(s):  Lumbar 4-5 Revision Discectomy Left  Subjective:    Pt reports ongoing pain in the low back that worsens with standing, moving and sitting on the toilet. It is a pressure with pain down the left lateral leg and feels similar to her pain from prior to surgery. She doesn't feel that she will be able to manage at home with her movement restrictions. She states that no one told her the surgery would be more painful because it was a revision. She hopes to go to a rehab center for a while, then she will have home cares after that.     Chest pain, SOB:  No  Nausea, vomiting:  No  Lightheadedness, dizziness:  No  Neuro:  Patient denies new onset numbness or paresthesias      Objective:  Blood pressure 102/53, pulse 75, temperature 98.3  F (36.8  C), temperature source Oral, resp. rate 18, SpO2 93 %.    Patient Vitals for the past 24 hrs:   BP Temp Temp src Pulse Resp SpO2   04/28/23 0723 102/53 98.3  F (36.8  C) Oral 75 18 93 %   04/27/23 2348 122/54 97.9  F (36.6  C) Oral 71 18 99 %   04/27/23 1457 125/69 98  F (36.7  C) Oral 86 19 95 %       Wt Readings from Last 4 Encounters:   06/01/22 67.6 kg (149 lb)   12/14/17 66.2 kg (146 lb)   10/11/17 67.6 kg (149 lb)   08/09/17 69.1 kg (152 lb 6.4 oz)       Gen: A&O x 3. NAD. Appears tired. Sitting up in bed.   Wound status: Covered, dressing has slight bloody drainage.   Circulation, motion and sensation: Dorsiflexion/plantarflexion at baseline, chronic drop foot on the right; distal lower extremity sensation is intact and equal bilaterally. Foot and toes are warm and well perfused.    Swelling: Mild    Pertinent Labs   Lab Results: personally reviewed.     Recent Labs   Lab Test 04/12/17  1502 02/08/17  1127 11/14/16  1334 11/04/15  1128 08/19/15  1307   HGB  --  15.8* 15.4 13.5 14.1   HCT  --  47.0 46.0 41.3 43.6   MCV  --  91 90 91 90   PLT  --  355 376 352 401   NA  --  143 141   --  139   CRP 10.5*  --   --   --   --        Plan:   Continue current cares and rehabilitation.  Anticoagulation protocol: Ambulation   Pain medications: Oxycodone increased to q 3hr, Vistaril increased to 25-50, Tylenol added  Weight bearing status:  WBAT  Disposition:  Plan for discharge to TCU when medically stable and pain is controlled, bed available. Possibly later today if bed found.             Report completed by:  Tyrell Jackson PA-C  Date: 4/28/2023  Time: 10:23 AM     Addendum:  Discussed with SW; extensive attempts to find TCU placement ongoing, she may not qualify given her limited mobility and activity at home prior to surgery and her ongoing needs. SW is continuing to investigate possibilities and will discuss options with pt for future financial assistance as well.   If unable to find a TCU, the plan would be for discharge to home with home care for RN/PT/HHA as previously ordered. Would tentatively plan for tomorrow in that event to allow for improvement in pain control and mobility.     Tyrell Jackson PA-C..................1:00PM 4/28/23

## 2023-04-29 ENCOUNTER — APPOINTMENT (OUTPATIENT)
Dept: PHYSICAL THERAPY | Facility: CLINIC | Age: 73
DRG: 519 | End: 2023-04-29
Attending: ORTHOPAEDIC SURGERY
Payer: MEDICARE

## 2023-04-29 VITALS
DIASTOLIC BLOOD PRESSURE: 77 MMHG | SYSTOLIC BLOOD PRESSURE: 130 MMHG | RESPIRATION RATE: 18 BRPM | OXYGEN SATURATION: 98 % | TEMPERATURE: 97.6 F | HEART RATE: 73 BPM

## 2023-04-29 PROCEDURE — 250N000013 HC RX MED GY IP 250 OP 250 PS 637: Performed by: INTERNAL MEDICINE

## 2023-04-29 PROCEDURE — 99232 SBSQ HOSP IP/OBS MODERATE 35: CPT | Performed by: NURSE PRACTITIONER

## 2023-04-29 PROCEDURE — 250N000013 HC RX MED GY IP 250 OP 250 PS 637: Performed by: ORTHOPAEDIC SURGERY

## 2023-04-29 PROCEDURE — 250N000012 HC RX MED GY IP 250 OP 636 PS 637: Performed by: ORTHOPAEDIC SURGERY

## 2023-04-29 PROCEDURE — 97116 GAIT TRAINING THERAPY: CPT | Mod: GP

## 2023-04-29 PROCEDURE — 250N000013 HC RX MED GY IP 250 OP 250 PS 637: Performed by: PHYSICIAN ASSISTANT

## 2023-04-29 RX ADMIN — PREDNISONE 15 MG: 10 TABLET ORAL at 07:59

## 2023-04-29 RX ADMIN — HYDROXYZINE HYDROCHLORIDE 50 MG: 25 TABLET, FILM COATED ORAL at 05:50

## 2023-04-29 RX ADMIN — DIAZEPAM 5 MG: 5 TABLET ORAL at 08:00

## 2023-04-29 RX ADMIN — OXYCODONE HYDROCHLORIDE 10 MG: 5 TABLET ORAL at 11:58

## 2023-04-29 RX ADMIN — POLYETHYLENE GLYCOL 3350 17 G: 17 POWDER, FOR SOLUTION ORAL at 08:00

## 2023-04-29 RX ADMIN — GABAPENTIN 300 MG: 300 CAPSULE ORAL at 08:00

## 2023-04-29 RX ADMIN — HYDROXYZINE HYDROCHLORIDE 50 MG: 25 TABLET, FILM COATED ORAL at 11:58

## 2023-04-29 RX ADMIN — OXYCODONE HYDROCHLORIDE 10 MG: 5 TABLET ORAL at 09:08

## 2023-04-29 RX ADMIN — OXYCODONE HYDROCHLORIDE 10 MG: 5 TABLET ORAL at 05:50

## 2023-04-29 RX ADMIN — SENNOSIDES AND DOCUSATE SODIUM 1 TABLET: 8.6; 5 TABLET ORAL at 08:00

## 2023-04-29 ASSESSMENT — ACTIVITIES OF DAILY LIVING (ADL)
ADLS_ACUITY_SCORE: 28
ADLS_ACUITY_SCORE: 27

## 2023-04-29 NOTE — PROGRESS NOTES
[facility] DISCHARGE NOTE    Patient discharged to home at 12:34 PM via wheel chair. Accompanied by other:friend and staff. Discharge instructions reviewed with patient, opportunity offered to ask questions. Prescriptions sent to patients preferred pharmacy. All belongings sent with patient.    KIM ROWELL RN

## 2023-04-29 NOTE — PROGRESS NOTES
[facility] Orthopaedics Progress Note      Post-operative Day: 3 Days Post-Op  Procedure(s):  Lumbar 4-5 Revision Discectomy Left      Complaining of significant pain and difficulty with mobility  Frustrated as she missed overnight dose of pain meds  Planning on discharge home with home care through [facility] - first visit set up for Sunday 4.30  Wanting to petition to stay another night because she doesn't feel safe going home        Objective:  Blood pressure 130/77, pulse 73, temperature 97.6  F (36.4  C), temperature source Oral, resp. rate 18, SpO2 98 %.    Patient Vitals for the past 24 hrs:   BP Temp Temp src Pulse Resp SpO2   04/29/23 0743 130/77 97.6  F (36.4  C) Oral 73 18 98 %   04/29/23 0310 115/69 98.3  F (36.8  C) Oral 63 18 96 %   04/28/23 1949 122/61 98.1  F (36.7  C) Oral 71 18 96 %   04/28/23 1527 100/51 98.2  F (36.8  C) Oral 66 16 96 %       Wt Readings from Last 4 Encounters:   06/01/22 67.6 kg (149 lb)   12/14/17 66.2 kg (146 lb)   10/11/17 67.6 kg (149 lb)   08/09/17 69.1 kg (152 lb 6.4 oz)       Gen: A&O x 3. NAD. Appears tired. Sitting up in bed.   Wound status: Covered, dressing has slight bloody drainage.   Circulation, motion and sensation: Dorsiflexion/plantarflexion at baseline, chronic drop foot on the right; distal lower extremity sensation is intact and equal bilaterally. Foot and toes are warm and well perfused.    Swelling: Mild    Pertinent Labs   Lab Results: personally reviewed.     Recent Labs   Lab Test 04/12/17  1502 02/08/17  1127 11/14/16  1334 11/04/15  1128 08/19/15  1307   HGB  --  15.8* 15.4 13.5 14.1   HCT  --  47.0 46.0 41.3 43.6   MCV  --  91 90 91 90   PLT  --  355 376 352 401   NA  --  143 141  --  139   CRP 10.5*  --   --   --   --        Plan:   Continue current cares and rehabilitation.  Anticoagulation protocol: Ambulation   Pain medications: Oxycodone increased to q 3hr, Vistaril increased to 25-50, Tylenol added  Weight bearing status:  WBAT  Disposition:  Home  with home care -- either today or tomorrow.  Had a long discussion and explained if pain controlled and cleared by therapy, she will discharge today.  But if needs more therapy might have to stay until tomorrow - although this will likely cause her to miss home care visit planned for 4.30.23            Report completed by:  Arnulfo Dawson MD  Date: 4/28/2023  Time: 10:23 AM

## 2023-04-29 NOTE — PROGRESS NOTES
Care Management Discharge Note    Discharge Date: 04/29/2023     Discharge Disposition: Skilled Nursing Facility    Discharge Services:      Discharge DME: None    Discharge Transportation: family or friend will provide    Private pay costs discussed: Not applicable    Patient/family educated on Medicare website which has current facility and service quality ratings: yes    Education Provided on the Discharge Plan: Yes  Persons Notified of Discharge Plans: patient  Patient/Family in Agreement with the Plan: yes    Handoff Referral Completed: Yes    Additional Information:    Patient medically ready for discharge today. Patient will discharge home with [facility] ([phone] Fax: [phone]) PT/OT/HHA. Friend will transport at discharge.    JOSEE AGRAWAL RN

## 2023-04-29 NOTE — DISCHARGE SUMMARY
DATE OF ADMISSION: 4/26/2023  DATE OF DISCHARGE: 4/29/2023    DISCHARGE DIAGNOSIS: s/p revision L4-5 diskectomy 4.26.23    HPI AND HOSPITAL COURSE: Leanne is a 72 year old female who underwent a revision L4-5 diskectomy on 4.26.23.  Postoperatively, she was admitted to the floor and followed by the hospitalist.  She had a difficult time with pain control and mobility but ultimately, by day post op day 3, she had progressed with PT, tolerated PO diet, voided, and pain was well controlled on orals meds with plan to go home [facility] home care.    DVT PROPHYLAXIS: Early ambulation       Medication List      Started    hydrOXYzine 25 MG tablet  Commonly known as: ATARAX  25-50 mg, Oral, EVERY 6 HOURS PRN     senna-docusate 8.6-50 MG tablet  Commonly known as: SENOKOT-S/PERICOLACE  1 tablet, Oral, 2 TIMES DAILY PRN        Modified    diazepam 2 MG tablet  Commonly known as: VALIUM  2 mg, Oral, EVERY 12 HOURS PRN, Once daily in the afternoon as needed.  What changed:     when to take this    reasons to take this    Another medication with the same name was removed. Continue taking this medication, and follow the directions you see here.     oxyCODONE 5 MG tablet  Commonly known as: ROXICODONE  5-10 mg, Oral, EVERY 3 HOURS PRN  What changed:     how much to take    when to take this    reasons to take this            DISCHARGE INSTRUCTIONS   1. Weight bearing as tolerated.     2. Aquacel dressing is waterproof.  May shower with dressing in place.  Dressing will be removed at 2 week post op check   3. Return to clinic: in 2 weeks as scheduled.  Call  with questions    DISCHARGE DISPOSITION: Home with home care    JOSSIE GARRETT MD

## 2023-04-29 NOTE — PROGRESS NOTES
Patient is alert and orientated. She is up with a stand by assist and a walker and uses her call light appropriately. She used her Incentive Spirometer with a 2000 output. She continues to call for pain control, but the times on the white board as well as keeping on a regimen has made the patient comfortable. Until this AM at 0547 Patient called demanding why writer didn't wake her up to give her pain meds in the middle of the night. Writer explained to the patient that her medications are as needed and that she needs to call us to let us know when she needs pain meds. Patient continued to yell at writer saying that writer should've known to bring her meds and that writer doesn't understand the pain she is in. Patient took her last dose of pain meds this am of Oxy 10mg and Atarax 50mg at 0547. She has a PIV that is saline locked.

## 2023-04-29 NOTE — CONSULTS
[facility] Medicine Progress Note  Date of Service: 04/29/2023    Assessment & Plan   Lenane Zuñiga is a 72 year old female who presented on 4/26/2023 for scheduled Procedure(s):  Lumbar 4-5 Revision Discectomy Left by Jp Calixto MD and is being followed by the hospital medicine service for co-management of acute and/or chronic perioperative medical problems.      S/p Procedure(s):  Lumbar 4-5 Revision Discectomy Left   3 Days Post-Op    - pain control, wound cares, physical therapy, occupational therapy and DVT prophylaxis per orthopedic surgery service    Rheumatoid Arthritis  History of RA since 2014. Last rheumatology appointment from review of charts 6/1/22 indicates that she is being treated with chronic prednisone therapy.  - Continue taking prednisone 4 mg once daily.  - Follow-up with rheumatology.     Anxiety  Continue PTA diazepam BID and PRN    Principal Problem:    S/P lumbar discectomy      DVT Prophylaxis: as per orthopedic surgery service - Defer to primary service  Code Status: Full Code    Lines: None    Lazar catheter: None    Discussion: Medically, the patient appears stable and ready for discharge home.  PT and OT have evaluated and concur with discharge home with home health, PT, and OT evaluation which has been set up for 4/30/2023.    Disposition: Anticipate discharge 4/29/2023.    Attestation:  I have reviewed today's vital signs, notes, medications, labs and imaging.    AMBER POLLOCK CNP       Interval History   Pain appears to be controlled on oral as needed opiates.  Patient states that nursing failed to wake her up to give her pain medications in the overnight hours so her pain was elevated when she first woke.  Per nursing she has been going no assistance with walking and toileting.    There were no acute events overnight.    Physical Exam   Temp:  [97.6  F (36.4  C)-98.3  F (36.8  C)] 97.6  F (36.4  C)  Pulse:  [63-73]  73  Resp:  [16-18] 18  BP: (100-130)/(51-77) 130/77  SpO2:  [96 %-98 %] 98 %    Weights: There were no vitals filed for this visit. There is no height or weight on file to calculate BMI.    General: In no apparent distress, sitting up in bed eating breakfast.  Becomes defensive when discussion returns to discharge home, requests to stay 1 more day.  CV: RRR, no edema, 2+ radial and DP pulses  Respiratory: CTA bilaterally  GI: Soft, nontender  Skin: Warm and dry  Musculoskeletal: Right dropfoot, unable to dorsiflex.  Has pain with lower extremity movements as expected following L4-5 discectomy (revision).  Neurological: Oriented x3, nerves grossly intact.    Data   No lab results found in last 7 days.    No results for input(s): GLC, BGM in the last 168 hours.     Unresulted Labs Ordered in the Past 30 Days of this Admission     No orders found from 3/27/2023 to 4/27/2023.           Imaging  No results found for this or any previous visit (from the past 24 hour(s)).     I reviewed all new labs and imaging results over the last 24 hours. I personally reviewed no images or EKG's today.    Medications     sodium chloride Stopped (04/27/23 2112)       gabapentin  300 mg Oral TID     polyethylene glycol  17 g Oral Daily     predniSONE  15 mg Oral Daily     senna-docusate  1 tablet Oral BID     sodium chloride (PF)  3 mL Intracatheter Q8H       AMBER POLLOCK CNP

## 2023-10-26 ENCOUNTER — TRANSCRIBE ORDERS (OUTPATIENT)
Dept: OTHER | Age: 73
End: 2023-10-26

## 2023-10-26 DIAGNOSIS — M05.741 RHEUMATOID ARTHRITIS INVOLVING BOTH HANDS WITH POSITIVE RHEUMATOID FACTOR (H): Primary | ICD-10-CM

## 2023-10-26 DIAGNOSIS — M05.742 RHEUMATOID ARTHRITIS INVOLVING BOTH HANDS WITH POSITIVE RHEUMATOID FACTOR (H): Primary | ICD-10-CM

## 2025-05-21 ENCOUNTER — MEDICAL CORRESPONDENCE (OUTPATIENT)
Dept: HEALTH INFORMATION MANAGEMENT | Facility: CLINIC | Age: 75
End: 2025-05-21
Payer: MEDICARE

## (undated) DEVICE — SOL NACL 0.9% IRRIG 1000ML BOTTLE 07138-09

## (undated) DEVICE — DRSG PRIMAPORE 03 1/8X6" 66000318

## (undated) DEVICE — SU VICRYL 2-0 CT-1 27" UND J259H

## (undated) DEVICE — SUCTION MANIFOLD NEPTUNE 2 SYS 1 PORT 702-025-000

## (undated) DEVICE — DRAPE C-ARM 60X42" 1013

## (undated) DEVICE — GOWN XLG DISP 9545

## (undated) DEVICE — PREP CHLORAPREP 26ML TINTED ORANGE  260815

## (undated) DEVICE — GLOVE BIOGEL PI MICRO INDICATOR UNDERGLOVE SZ 8.0 48980

## (undated) DEVICE — GLOVE BIOGEL PI ULTRATOUCH G SZ 6.5 42165

## (undated) DEVICE — GOWN LG DISP 9515

## (undated) DEVICE — GLOVE BIOGEL PI MICRO INDICATOR UNDERGLOVE SZ 6.5 48965

## (undated) DEVICE — Device

## (undated) DEVICE — MIDAS REX DISSECTING TOOL  14MH30

## (undated) DEVICE — NDL ANGIOCATH 14GA 2" 4088

## (undated) DEVICE — ADH LIQUID MASTISOL TOPICAL VIAL 2-3ML 0523-48

## (undated) DEVICE — DRAPE MICRO ZEISS MD 48"X120CM

## (undated) DEVICE — DECANTER VIAL 2006S

## (undated) DEVICE — SYR 20ML LL W/O NDL

## (undated) DEVICE — RX SURGIFLO HEMOSTATIC MATRIX W/THROMBIN 8ML 2994

## (undated) DEVICE — STOCKING SLEEVE COMPRESSION CALF MED

## (undated) DEVICE — SYR 10ML FINGER CONTROL W/O NDL 309695

## (undated) DEVICE — BLANKET BAIR HUGGER UPPER BODY 42268

## (undated) DEVICE — SU STRATAFIX MONOCRYL 3-0 SPIRAL PS-2 30CM SXMP1B106

## (undated) DEVICE — GLOVE BIOGEL PI ULTRATOUCH G SZ 7.5 42175

## (undated) DEVICE — SUCTION TIP YANKAUER STR K87

## (undated) DEVICE — SOL WATER IRRIG 1000ML BOTTLE 07139-09

## (undated) DEVICE — SU VICRYL 1 CT-1 CR 8X18" J741D

## (undated) RX ORDER — KETOROLAC TROMETHAMINE 30 MG/ML
INJECTION, SOLUTION INTRAMUSCULAR; INTRAVENOUS
Status: DISPENSED
Start: 2023-04-26

## (undated) RX ORDER — ONDANSETRON 2 MG/ML
INJECTION INTRAMUSCULAR; INTRAVENOUS
Status: DISPENSED
Start: 2023-04-26

## (undated) RX ORDER — BUPIVACAINE HYDROCHLORIDE 2.5 MG/ML
INJECTION, SOLUTION EPIDURAL; INFILTRATION; INTRACAUDAL
Status: DISPENSED
Start: 2023-04-26

## (undated) RX ORDER — DEXAMETHASONE SODIUM PHOSPHATE 4 MG/ML
INJECTION, SOLUTION INTRA-ARTICULAR; INTRALESIONAL; INTRAMUSCULAR; INTRAVENOUS; SOFT TISSUE
Status: DISPENSED
Start: 2023-04-26

## (undated) RX ORDER — LIDOCAINE HYDROCHLORIDE 10 MG/ML
INJECTION, SOLUTION EPIDURAL; INFILTRATION; INTRACAUDAL; PERINEURAL
Status: DISPENSED
Start: 2023-04-26

## (undated) RX ORDER — CEFAZOLIN SODIUM/WATER 2 G/20 ML
SYRINGE (ML) INTRAVENOUS
Status: DISPENSED
Start: 2023-04-26

## (undated) RX ORDER — PROPOFOL 10 MG/ML
INJECTION, EMULSION INTRAVENOUS
Status: DISPENSED
Start: 2023-04-26

## (undated) RX ORDER — FENTANYL CITRATE 50 UG/ML
INJECTION, SOLUTION INTRAMUSCULAR; INTRAVENOUS
Status: DISPENSED
Start: 2023-04-26

## (undated) RX ORDER — HYDROMORPHONE HCL IN WATER/PF 6 MG/30 ML
PATIENT CONTROLLED ANALGESIA SYRINGE INTRAVENOUS
Status: DISPENSED
Start: 2023-04-26